# Patient Record
Sex: FEMALE | Race: WHITE | Employment: FULL TIME | URBAN - METROPOLITAN AREA
[De-identification: names, ages, dates, MRNs, and addresses within clinical notes are randomized per-mention and may not be internally consistent; named-entity substitution may affect disease eponyms.]

---

## 2020-03-18 ENCOUNTER — HOSPITAL ENCOUNTER (OUTPATIENT)
Dept: GENERAL RADIOLOGY | Age: 25
Discharge: HOME | End: 2020-03-18
Payer: COMMERCIAL

## 2020-03-18 ENCOUNTER — OFFICE VISIT (OUTPATIENT)
Dept: FAMILY MEDICINE CLINIC | Age: 25
End: 2020-03-18
Payer: COMMERCIAL

## 2020-03-18 VITALS
HEART RATE: 108 BPM | SYSTOLIC BLOOD PRESSURE: 118 MMHG | OXYGEN SATURATION: 98 % | WEIGHT: 163 LBS | DIASTOLIC BLOOD PRESSURE: 86 MMHG | BODY MASS INDEX: 32 KG/M2 | HEIGHT: 60 IN

## 2020-03-18 PROBLEM — M25.562 PAIN IN BOTH KNEES: Status: ACTIVE | Noted: 2020-03-18

## 2020-03-18 PROBLEM — J45.909 ASTHMA IN ADULT: Status: ACTIVE | Noted: 2020-03-18

## 2020-03-18 PROBLEM — F34.1 PERSISTENT DEPRESSIVE DISORDER: Status: ACTIVE | Noted: 2020-03-18

## 2020-03-18 PROBLEM — E55.9 VITAMIN D INSUFFICIENCY: Status: ACTIVE | Noted: 2020-03-18

## 2020-03-18 PROBLEM — E06.3 HASHIMOTO'S THYROIDITIS: Status: ACTIVE | Noted: 2020-03-18

## 2020-03-18 PROBLEM — M25.561 PAIN IN BOTH KNEES: Status: ACTIVE | Noted: 2020-03-18

## 2020-03-18 PROBLEM — Z13.220 LIPID SCREENING: Status: ACTIVE | Noted: 2020-03-18

## 2020-03-18 PROCEDURE — G8427 DOCREV CUR MEDS BY ELIG CLIN: HCPCS | Performed by: FAMILY MEDICINE

## 2020-03-18 PROCEDURE — 73560 X-RAY EXAM OF KNEE 1 OR 2: CPT

## 2020-03-18 PROCEDURE — G8431 POS CLIN DEPRES SCRN F/U DOC: HCPCS | Performed by: FAMILY MEDICINE

## 2020-03-18 PROCEDURE — 1036F TOBACCO NON-USER: CPT | Performed by: FAMILY MEDICINE

## 2020-03-18 PROCEDURE — G8417 CALC BMI ABV UP PARAM F/U: HCPCS | Performed by: FAMILY MEDICINE

## 2020-03-18 PROCEDURE — G0444 DEPRESSION SCREEN ANNUAL: HCPCS | Performed by: FAMILY MEDICINE

## 2020-03-18 PROCEDURE — 99203 OFFICE O/P NEW LOW 30 MIN: CPT | Performed by: FAMILY MEDICINE

## 2020-03-18 PROCEDURE — G8484 FLU IMMUNIZE NO ADMIN: HCPCS | Performed by: FAMILY MEDICINE

## 2020-03-18 RX ORDER — CLINDAMYCIN PHOSPHATE 10 UG/ML
LOTION TOPICAL 2 TIMES DAILY
COMMUNITY
End: 2021-03-29 | Stop reason: ALTCHOICE

## 2020-03-18 RX ORDER — ARMODAFINIL 150 MG/1
TABLET ORAL
COMMUNITY
Start: 2020-02-24 | End: 2020-05-14 | Stop reason: ALTCHOICE

## 2020-03-18 RX ORDER — ALBUTEROL SULFATE 90 UG/1
2 AEROSOL, METERED RESPIRATORY (INHALATION) 4 TIMES DAILY PRN
Qty: 1 INHALER | Refills: 5 | Status: SHIPPED | OUTPATIENT
Start: 2020-03-18 | End: 2022-03-11 | Stop reason: SDUPTHER

## 2020-03-18 RX ORDER — METHYLPHENIDATE HYDROCHLORIDE 10 MG/1
10 TABLET ORAL PRN
COMMUNITY
Start: 2020-01-31

## 2020-03-18 RX ORDER — METHYLPHENIDATE HYDROCHLORIDE 20 MG/1
20 CAPSULE, EXTENDED RELEASE ORAL 2 TIMES DAILY
COMMUNITY
Start: 2020-03-12

## 2020-03-18 RX ORDER — NORETHINDRONE ACETATE AND ETHINYL ESTRADIOL 1MG-20(21)
KIT ORAL
COMMUNITY
Start: 2020-03-16 | End: 2020-12-03 | Stop reason: ALTCHOICE

## 2020-03-18 RX ORDER — LEVOTHYROXINE SODIUM 0.07 MG/1
TABLET ORAL
COMMUNITY
Start: 2020-02-02 | End: 2021-04-16 | Stop reason: SDUPTHER

## 2020-03-18 ASSESSMENT — PATIENT HEALTH QUESTIONNAIRE - PHQ9
8. MOVING OR SPEAKING SO SLOWLY THAT OTHER PEOPLE COULD HAVE NOTICED. OR THE OPPOSITE, BEING SO FIGETY OR RESTLESS THAT YOU HAVE BEEN MOVING AROUND A LOT MORE THAN USUAL: 1
3. TROUBLE FALLING OR STAYING ASLEEP: 1
5. POOR APPETITE OR OVEREATING: 1
7. TROUBLE CONCENTRATING ON THINGS, SUCH AS READING THE NEWSPAPER OR WATCHING TELEVISION: 2
SUM OF ALL RESPONSES TO PHQ QUESTIONS 1-9: 13
4. FEELING TIRED OR HAVING LITTLE ENERGY: 2
SUM OF ALL RESPONSES TO PHQ9 QUESTIONS 1 & 2: 5
2. FEELING DOWN, DEPRESSED OR HOPELESS: 3
10. IF YOU CHECKED OFF ANY PROBLEMS, HOW DIFFICULT HAVE THESE PROBLEMS MADE IT FOR YOU TO DO YOUR WORK, TAKE CARE OF THINGS AT HOME, OR GET ALONG WITH OTHER PEOPLE: 2
1. LITTLE INTEREST OR PLEASURE IN DOING THINGS: 2
9. THOUGHTS THAT YOU WOULD BE BETTER OFF DEAD, OR OF HURTING YOURSELF: 0
SUM OF ALL RESPONSES TO PHQ QUESTIONS 1-9: 13
6. FEELING BAD ABOUT YOURSELF - OR THAT YOU ARE A FAILURE OR HAVE LET YOURSELF OR YOUR FAMILY DOWN: 1

## 2020-03-18 ASSESSMENT — ENCOUNTER SYMPTOMS
CONSTIPATION: 0
NAUSEA: 0
DIARRHEA: 0
SHORTNESS OF BREATH: 0
EYE REDNESS: 0
VOMITING: 0

## 2020-03-18 NOTE — PROGRESS NOTES
3/18/2020    Hellen Sacks (:  1995) is a 30 y.o. female, here for evaluation of the following medical concerns:    Knee Pain    Incident onset: yrs. There was no injury mechanism. Pain location: both knees. The quality of the pain is described as aching and burning (trun red). The pain is mild. The pain has been intermittent since onset. Pertinent negatives include no inability to bear weight, muscle weakness, numbness or tingling. The symptoms are aggravated by weight bearing. She has tried nothing for the symptoms. The treatment provided no relief. Chief Complaint   Patient presents with    Established New Doctor     new patient     clindomycin and hydrocortisone as needed. She has a history of PMDD:  Seeing counselor, for depression, he recommend CBT for PDD before meds. has been on med in past, when she was 21, only stayed on for 1 month. Has hypersomnia, did sleep study yrs ago, gets nuvigil and ritalin from previous pulmonologist.  She will continue to follow with her previous pulmonologist for this. Hx exercise induece d asthma with some mild intermittent asthma. She does well and rarely needs an inhaler but she does need a refill on that today. 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 He has Hashimoto's and has had it for years and is requesting a specialist for this. I will go ahead and order her TSH today and then will have her follow with endocrinology see plan. Review of Systems   Constitutional: Negative for unexpected weight change. HENT: Negative. Eyes: Negative for redness. Respiratory: Negative for shortness of breath. Cardiovascular: Negative for chest pain and leg swelling. Gastrointestinal: Negative for constipation, diarrhea, nausea and vomiting. Endocrine: Negative for cold intolerance and heat intolerance. Musculoskeletal: Positive for arthralgias. Negative for gait problem and joint swelling. Of knees   Skin: Negative for pallor. She gets erythematous hives especially when she is nervous or talking about herself   Allergic/Immunologic: Negative for immunocompromised state. Neurological: Positive for dizziness. Negative for tingling and numbness. She does admit to rare and recurrent episodes of 30 second dizziness  For yrs happens usually when she is in a meeting or staring at the computer. Not associated with ora balance problem or confusion or loss of consciousness. Psychiatric/Behavioral: Negative for confusion. All other systems reviewed and are negative. Prior to Visit Medications    Medication Sig Taking?  Authorizing Provider   levothyroxine (SYNTHROID) 75 MCG tablet  Yes Historical Provider, MD   Armodafinil (NUVIGIL) 150 MG TABS tablet  Yes Historical Provider, MD   methylphenidate (RITALIN LA) 20 MG extended release capsule  Yes Historical Provider, MD   methylphenidate (RITALIN) 10 MG tablet  Yes Historical Provider, MD   BLISOVI FE 1/20 1-20 MG-MCG per tablet  Yes Historical Provider, MD        Allergies   Allergen Reactions    Latex Rash       Past Medical History:   Diagnosis Date    Asthma     Hashimoto's thyroiditis     Hypersomnia     PCOS (polycystic ovarian syndrome)        Past Surgical History:   Procedure Laterality Date    BREAST ENHANCEMENT SURGERY      TONSILLECTOMY AND ADENOIDECTOMY         Social History     Socioeconomic History    Marital status: Single     Spouse name: Not on file    Number of children: Not on file    Years of education: Not on file    Highest education level: Not on file   Occupational History    Not on file   Social Needs    Financial resource strain: Not on file    Food insecurity     Worry: Not on file     Inability: Not on file    Transportation needs     Medical: Not on file     Non-medical: Not on file   Tobacco Use  Smoking status: Never Smoker    Smokeless tobacco: Never Used   Substance and Sexual Activity    Alcohol use: Yes    Drug use: Not Currently    Sexual activity: Yes   Lifestyle    Physical activity     Days per week: Not on file     Minutes per session: Not on file    Stress: Not on file   Relationships    Social connections     Talks on phone: Not on file     Gets together: Not on file     Attends [employer] service: Not on file     Active member of club or organization: Not on file     Attends meetings of clubs or organizations: Not on file     Relationship status: Not on file    Intimate partner violence     Fear of current or ex partner: Not on file     Emotionally abused: Not on file     Physically abused: Not on file     Forced sexual activity: Not on file   Other Topics Concern    Not on file   Social History Narrative    Not on file        Family History   Problem Relation Age of Onset    Cancer Mother     Diabetes Father     Cancer Maternal Aunt     Cancer Paternal Aunt     Cancer Maternal Grandmother        Vitals:    03/18/20 1022 03/18/20 1024   BP: (!) 136/107 118/86   Site: Right Lower Arm Left Upper Arm   Position: Sitting Sitting   Cuff Size: Medium Adult Medium Adult   Pulse: 108    SpO2: 98%    Weight: 163 lb (73.9 kg)    Height: 5' (1.524 m)      Estimated body mass index is 31.83 kg/m² as calculated from the following:    Height as of this encounter: 5' (1.524 m). Weight as of this encounter: 163 lb (73.9 kg). Physical Exam  Vitals signs and nursing note reviewed. Constitutional:       General: She is not in acute distress. Appearance: Normal appearance. She is well-developed. She is obese. She is not ill-appearing, toxic-appearing or diaphoretic. HENT:      Head: Normocephalic and atraumatic. Eyes:      General: No scleral icterus. Right eye: No discharge. Left eye: No discharge.       Conjunctiva/sclera: Conjunctivae normal.   Neck: Musculoskeletal: Neck supple. No neck rigidity. Cardiovascular:      Rate and Rhythm: Normal rate and regular rhythm. Heart sounds: Normal heart sounds. Comments: Rate during physical exam was 94  Pulmonary:      Effort: Pulmonary effort is normal. No respiratory distress. Breath sounds: Normal breath sounds. No stridor. Musculoskeletal:      Right knee: She exhibits normal patellar mobility. No tenderness found. Left knee: No tenderness found. Right lower leg: No edema. Left lower leg: No edema. Comments: Joint crepitus with movement on her left knee. Skin:     General: Skin is warm and dry. Coloration: Skin is not pale. Findings: No erythema or rash. Neurological:      Mental Status: She is alert and oriented to person, place, and time. Psychiatric:         Mood and Affect: Mood normal.         Behavior: Behavior normal.         Thought Content: Thought content normal.         Judgment: Judgment normal.          Diagnosis Orders   1. Hashimoto's thyroiditis  TSH WITH REFLEX TO FT4    Adolfo Velez MD, Endocrinology, [facility]    BASIC METABOLIC PANEL   2. Positive depression screening  Positive Screen for Clinical Depression with a Documented Follow-up Plan    3. Vitamin D insufficiency  Vitamin D 25 Hydroxy   4. Pain in both knees, unspecified chronicity  XR KNEE LEFT (1-2 VIEWS)   5. Lipid screening  Lipid Panel   6. Mild intermittent asthma in adult without complication     7. Persistent depressive disorder       [facility] was seen today for established new doctor. Diagnoses and all orders for this visit:    Hashimoto's thyroiditis  -     TSH WITH REFLEX TO FT4; Future  -     Adolfo Velez MD, Endocrinology, [facility]  -     BASIC METABOLIC PANEL;  Future    Positive depression screening/Persistent depressive disorder  -     Positive Screen for Clinical Depression with a Documented Follow-up Plan     Vitamin D insufficiency  -     Vitamin D 25 Hydroxy; Future    Pain in both knees, unspecified chronicity  -     XR KNEE LEFT (1-2 VIEWS); Future    Lipid screening  -     Lipid Panel; Future    Mild intermittent asthma in adult without complication  -     albuterol sulfate HFA (VENTOLIN HFA) 108 (90 Base) MCG/ACT inhaler; Inhale 2 puffs into the lungs 4 times daily as needed for Wheezing            On the basis of positive PHQ-9 screening (PHQ-9 Total Score: 13), the following plan was implemented: she already has dx of PDD, following with counselor .     If you need a form filled out for Be well within, we can do it from todays exam.      --Shasha Herrera, DO on 3/18/2020 at 10:25 AM

## 2020-03-18 NOTE — PATIENT INSTRUCTIONS
Your fasting blood sugar should be below 100. If it is between 100-125 that is considered high and known as prediabetes, or  impaired glucose tolerance. If your blood sugar is too high, go to diabetes. org for a diabetes prevention diet. The A1c shows your average blood sugar over the previous 3 months. It is sometimes ordered if your blood sugar is elevated  You do not have to be fasting to get it drawn. If it is 5.6 or below it means your blood sugar is in the normal range  If between 5.7 and 6.5 it is impaired glucose tolerance. If it is above 6.5 it is consistent with the diagnosis of  diabetes. Decrease high carb foods if your blood sugar is high. High carbohydrate foods are:  Breads, muffins, rolls, and bagels  Pasta, rice, corn, and grains  Potatoes, sweet potatoes  Legumes: peas beans lentils. Milk ( almond milk ok)  Most fruit except berries  Cake cookies pies ice cream candy etc.  Juices, soda, sweetened ice tea  Beer. Cholesterol, the ideal numbers explained: Total cholesterol should be below 200  The triglycerides should be below 150  The HDL, the good cholesterol should be above 40  The LDL, the bad cholesterol should be below 100. Although it can be as high as 130 if no risk factors for heart disease or no diabetes. Improve your cholesterol profile:     Cardiovascular exercise helps. Start with 15 minutes three times a week and the work up to at least 30 minutes 5 days a week. Exercise at a level that you can carry on a conversation during. Loose extra pounds. Pay attention to your serving sides and avoid eating second helpings at meals. Significantly decrease the amount of processed food, junk food, and fast food that you eat. Decrease animal sources of saturated fats, and completely avoid and eliminate  hydrogenated oils and trans fats. Check the Food Label on the food you eat and avoid foods that have hydrogenated vegetable oils in them.  That includes bakery products. Drink skim milk which contains no fat. Increase the amount of fresh food that you cook yourself. Eat at least five servings of fruits and vegetables a day. Increase the portion of your plate that contains the fruit and vegetables to at least 50%,-70%,  and decrease the amount of starches like potatoes, rice, pasta to no more than 25% of your plate. Increase your fiber intake. Fiber is found in fruits and vegetables as well as whole grains, oatmeal,  and beans. A diet with at least 5 servings of fruits and vegetables should give you about 10-20grams of fiber daily. Switch to monounsaturated fats like olive oil. Fat from olives, avocados, coconut, or other nuts is okay. Add baked or grilled fish to you diet at least 1-2 times a week. Learn more about cholesterol on the Internet. Check out these resources:    American Heart Association   Heart. [address]:   [employer]    Triglycerides can be lowered without medication by exercising, and loosing weight and eating a diet lower in carbohydrates especially from flour sources,  and avoiding simple sugars. The good cholesterol is HDL. In order to increase the good cholesterol, increasing cardiovascular exercise helps. Avoid hydrogenated oils (trans fats) in processed, bakery,  and junk food. Use monounsaturated fats such as olive oil can help raise the good cholesterol. For your weight, exercise 30 minutes 5 times weekly and improve the types of food you eat:    Protein   Best options: The American Diabetes Association (ADA) recommends lean proteins low in saturated fat, like fish or turkey. Aim for two servings of seafood each week; some fish, like salmon, have the added benefit of containing heart healthy omega-3 fats. For a vegetarian protein source, experiment with the wide variety of beans. Nuts, which are protein and healthy fats powerhouses, are also a choice.    Worst options: Processed deli meats and hot day.      Avoid or decrease processed food, fast food, and junk food.     Begin to exercise 15 minutes three times a week doing cardiovascular exercise.     Don't quit. It can take 12 weeks to break old habits before you feel like you are in a new routine. Then you have to live your new lifestyle. Loren Leonard Once you are adjusted to your new habits you will not have to keep recording your daily intake, you should have a good idea of what is a normal amount for you to eat each day.     If you are obese, have elevated blood sugar, heart disease or a medical condition that is worsened by excess weight, I can refer you to a [employer] for education, usually covered by insurance.   

## 2020-04-06 ENCOUNTER — VIRTUAL VISIT (OUTPATIENT)
Dept: GYNECOLOGY | Age: 25
End: 2020-04-06
Payer: COMMERCIAL

## 2020-04-06 PROCEDURE — G8417 CALC BMI ABV UP PARAM F/U: HCPCS | Performed by: OBSTETRICS & GYNECOLOGY

## 2020-04-06 PROCEDURE — G8427 DOCREV CUR MEDS BY ELIG CLIN: HCPCS | Performed by: OBSTETRICS & GYNECOLOGY

## 2020-04-06 PROCEDURE — 99202 OFFICE O/P NEW SF 15 MIN: CPT | Performed by: OBSTETRICS & GYNECOLOGY

## 2020-04-06 PROCEDURE — 1036F TOBACCO NON-USER: CPT | Performed by: OBSTETRICS & GYNECOLOGY

## 2020-04-06 RX ORDER — DROSPIRENONE AND ETHINYL ESTRADIOL 0.03MG-3MG
1 KIT ORAL DAILY
Qty: 1 PACKET | Refills: 3 | Status: SHIPPED | OUTPATIENT
Start: 2020-04-06 | End: 2020-08-03

## 2020-04-17 PROBLEM — Z13.220 LIPID SCREENING: Status: RESOLVED | Noted: 2020-03-18 | Resolved: 2020-04-17

## 2020-05-14 ENCOUNTER — VIRTUAL VISIT (OUTPATIENT)
Dept: ENDOCRINOLOGY | Age: 25
End: 2020-05-14
Payer: COMMERCIAL

## 2020-05-14 PROCEDURE — 99442 PR PHYS/QHP TELEPHONE EVALUATION 11-20 MIN: CPT | Performed by: INTERNAL MEDICINE

## 2020-05-14 PROCEDURE — G8427 DOCREV CUR MEDS BY ELIG CLIN: HCPCS | Performed by: INTERNAL MEDICINE

## 2020-05-14 ASSESSMENT — ENCOUNTER SYMPTOMS
VOMITING: 0
SORE THROAT: 0
ABDOMINAL PAIN: 0
COUGH: 0
NAUSEA: 0
SHORTNESS OF BREATH: 0
BACK PAIN: 0

## 2020-05-14 NOTE — PROGRESS NOTES
ELZA Ruth is a 30 y.o. female who was seen in a virtual visit  for initial evaluation of thyroid disease. Patient is being seen at the request of Paul Hernandez DO     Was seeing Omar Schuster MD at [address]      Due to the COVID-19 restrictions on close contact interactions the patient's visit was conducted via video link  ( [email or web address] me) in lieu of a face to face visit. Location for patient : home  Physician : home    Pursuant to the emergency declaration under the [facility], [address] [place] [address] and the [employer] Act, this Virtual  Visit was conducted, with patient's consent, to reduce the patient's risk of exposure to COVID-19 and provide necessary care. Because this was a Virtual Visit, evaluation of the following organ systems was limited: Vitals, Constitutional, EENT, Resp, CV, GI, , MS, Neuro, Skin. Heme. Lymph, Imm. Patient has a PMH of hypothyroidism, PCOS, obesity, vitamin D     Diagnosed with Hypothyroidism in her early 19 yrs. Was diagnosed with hashimoto's thyroiditis at the age of 10 yrs. Current thyroid medication: levothyroxine 75 mcg daily. Takes it first thing in the morning on empty stomach      FH of hypothyroidism: Aunt . FH of thyroid cancer: No       C/o Fatigue, weight gain   C/o cold intolerance,  C/o  hair loss, dry skin,   No constipation         PCOS : Has been diagnosed with PCOS in her teens. Reports high testosterone and cysts on her ovaries at the age of 13 yrs. She has been on  birth control since age of 13 yrs  No plans for fertility at this point. Never used metformin. Has h/o hirsutism. On birth control. Could not tolerate aldactone     Has hair on her face ( side buns ) . Gets those removed regularly. Obesity : has tried sexanda in the past and lost weight.    Stopped as she did not want to take daily shot

## 2020-05-19 ENCOUNTER — TELEPHONE (OUTPATIENT)
Dept: ENDOCRINOLOGY | Age: 25
End: 2020-05-19

## 2020-08-03 RX ORDER — DROSPIRENONE AND ETHINYL ESTRADIOL 0.03MG-3MG
KIT ORAL
Qty: 28 TABLET | Refills: 2 | Status: SHIPPED | OUTPATIENT
Start: 2020-08-03 | End: 2020-10-26

## 2020-08-03 NOTE — TELEPHONE ENCOUNTER
Medication:   Requested Prescriptions     Pending Prescriptions Disp Refills    SALLIE 3-0.03 MG TABS [Pharmacy Med Name: SALLIE 28 TABLET] 28 tablet 2     Sig: TAKE ONE TABLET BY MOUTH DAILY     Last Filled: N/A    Last Appt: 4/6/2020  Next Appt: Visit date not found

## 2020-08-20 ENCOUNTER — HOSPITAL ENCOUNTER (OUTPATIENT)
Age: 25
Discharge: HOME | End: 2020-08-20
Payer: COMMERCIAL

## 2020-08-20 LAB — TSH REFLEX FT4: 0.86 UIU/ML (ref 0.27–4.2)

## 2020-08-20 PROCEDURE — 36415 COLL VENOUS BLD VENIPUNCTURE: CPT

## 2020-08-20 PROCEDURE — 84443 ASSAY THYROID STIM HORMONE: CPT

## 2020-09-04 ENCOUNTER — EMPLOYEE WELLNESS (OUTPATIENT)
Dept: OTHER | Age: 25
End: 2020-09-04

## 2020-09-04 LAB
CHOLESTEROL, TOTAL: 186 MG/DL (ref 0–199)
GLUCOSE BLD-MCNC: 91 MG/DL (ref 70–99)
HDLC SERPL-MCNC: 67 MG/DL (ref 40–60)
LDL CHOLESTEROL CALCULATED: 96 MG/DL
TRIGL SERPL-MCNC: 117 MG/DL (ref 0–150)

## 2020-10-13 ENCOUNTER — HOSPITAL ENCOUNTER (OUTPATIENT)
Dept: ULTRASOUND IMAGING | Age: 25
Discharge: HOME | End: 2020-10-13
Payer: COMMERCIAL

## 2020-10-13 PROCEDURE — 76536 US EXAM OF HEAD AND NECK: CPT

## 2020-10-15 ENCOUNTER — PATIENT MESSAGE (OUTPATIENT)
Dept: FAMILY MEDICINE CLINIC | Age: 25
End: 2020-10-15

## 2020-10-15 NOTE — TELEPHONE ENCOUNTER
From: Rebeca Vicente  To: Norma Farias DO  Sent: 10/15/2020 10:25 AM EDT  Subject: Non-Urgent Medical Question    Good Morning-    For a couple weeks I have been feeling a mostly constant pressure in my lower front neck. It feels as though something is pressing against it. I contacted my Endocrinologist and had an ultrasound on my thyroid which came back fine. He advised to reach out to my PCP to get a referral for an ENT if the pressure persists. I also have a few bumps towards the back of my throat that could be scar tissue from having my tonsils removed but I am not certain. Thank you.

## 2020-10-19 VITALS — BODY MASS INDEX: 31.64 KG/M2 | WEIGHT: 162 LBS

## 2020-10-23 ENCOUNTER — OFFICE VISIT (OUTPATIENT)
Dept: ENT CLINIC | Age: 25
End: 2020-10-23
Payer: COMMERCIAL

## 2020-10-23 VITALS
BODY MASS INDEX: 33.18 KG/M2 | HEART RATE: 94 BPM | SYSTOLIC BLOOD PRESSURE: 121 MMHG | HEIGHT: 60 IN | DIASTOLIC BLOOD PRESSURE: 80 MMHG | WEIGHT: 169 LBS | TEMPERATURE: 97.5 F | RESPIRATION RATE: 16 BRPM

## 2020-10-23 PROCEDURE — 99203 OFFICE O/P NEW LOW 30 MIN: CPT | Performed by: OTOLARYNGOLOGY

## 2020-10-23 PROCEDURE — G8484 FLU IMMUNIZE NO ADMIN: HCPCS | Performed by: OTOLARYNGOLOGY

## 2020-10-23 PROCEDURE — 1036F TOBACCO NON-USER: CPT | Performed by: OTOLARYNGOLOGY

## 2020-10-23 PROCEDURE — G8427 DOCREV CUR MEDS BY ELIG CLIN: HCPCS | Performed by: OTOLARYNGOLOGY

## 2020-10-23 PROCEDURE — G8417 CALC BMI ABV UP PARAM F/U: HCPCS | Performed by: OTOLARYNGOLOGY

## 2020-10-23 RX ORDER — IBUPROFEN 600 MG/1
600 TABLET ORAL 4 TIMES DAILY PRN
Qty: 120 TABLET | Refills: 0 | Status: SHIPPED | OUTPATIENT
Start: 2020-10-23 | End: 2020-11-10

## 2020-10-23 NOTE — PROGRESS NOTES
[address] [facility] SURGERY  NEW PATIENT HISTORY AND PHYSICAL NOTE      Patient Name: Andrea Qiu  Medical Record Number:  [id number]  Primary Care Physician:  Michelle Culp DO    ChiefComplaint     Chief Complaint   Patient presents with    Other     Patient states that she has pain in lower anterior neck, states that she had an US and her thyroid was normal. States that she has bumps on the back of her tongue. States that she had tonsilectomy in jan 2018, is unsure if these bumps are scar tissue or are new. History of Present Illness     Andrea Qiu is an 30 y.o. female with history of anterior neck discomfort without sabas pain that is been ongoing for the past 2 weeks, with no incident infection or trauma. Symptoms are intermittent in nature, although she states they are worse when she is sitting upright-cannot lying supine. No hoarseness, dysphagia/odynophagia. She has prior history of aural fullness and pain (improved with Flonase-ETD?)  However no referred otalgia or aural fullness or hearing loss at this time. Prior history of Hashimoto's thyroiditis, recent thyroid ultrasound without active thyroiditis or thyroid nodules. Has history of thyroid disease in the family however no history of thyroidectomy / thyroid cancer. Prior history of cervical HPV, states that at last OB/Gyn visit she was told it had cleared. She has prior history of tonsillectomy in 2019 for a papillomatous mass on the right tonsil.      Past Medical History     Past Medical History:   Diagnosis Date    Allergic rhinitis     Asthma     Dizziness     Hashimoto's thyroiditis     Headache     HPV (human papilloma virus) infection     Hypersomnia     PCOS (polycystic ovarian syndrome)        Past Surgical History     Past Surgical History:   Procedure Laterality Date    BREAST ENHANCEMENT SURGERY      COLPOSCOPY      TONSILLECTOMY AND ADENOIDECTOMY         Family History     Family History   Problem Relation Age of Onset    Cancer Mother     Diabetes Father     Cancer Maternal Aunt     Cancer Paternal Aunt     Cancer Maternal Grandmother        Social History     Social History     Tobacco Use    Smoking status: Never Smoker    Smokeless tobacco: Never Used   Substance Use Topics    Alcohol use: Yes    Drug use: Not Currently        Allergies     Allergies   Allergen Reactions    Latex Rash       Medications     Current Outpatient Medications   Medication Sig Dispense Refill    ibuprofen (ADVIL;MOTRIN) 600 MG tablet Take 1 tablet by mouth 4 times daily as needed for Pain 120 tablet 0    SALLIE 3-0.03 MG TABS TAKE ONE TABLET BY MOUTH DAILY 28 tablet 2    levothyroxine (SYNTHROID) 75 MCG tablet       methylphenidate (RITALIN LA) 20 MG extended release capsule       methylphenidate (RITALIN) 10 MG tablet       albuterol sulfate HFA (VENTOLIN HFA) 108 (90 Base) MCG/ACT inhaler Inhale 2 puffs into the lungs 4 times daily as needed for Wheezing 1 Inhaler 5    BLISOVI FE 1/20 1-20 MG-MCG per tablet       hydrocortisone 2.5 % ointment Apply topically 2 times daily Apply topically 2 times daily.  clindamycin (CLEOCIN T) 1 % lotion Apply topically 2 times daily Apply topically 2 times daily. No current facility-administered medications for this visit.         Review of Systems     REVIEW OF SYSTEMS  The following systems were reviewed and revealed the following in addition to any already discussed in the HPI:    CONSTITUTIONAL: no  weight loss, no fever, no night sweats, no chills  EYES: no vision changes, no blurry vision  EARS: no changes in hearing, no otalgia  NOSE: no epistaxis, no rhinorrhea  RESPIRATORY: no difficulty breathing, no shortness of breath  CV: no chest pain, no peripheral vascular disease  HEME: No coagulation disorder, no bleeding disorder  NEURO: No TIA or stroke-like symptoms  SKIN: No new rashes in the head and neck, no recent skin cancers  MOUTH: No new ulcers, no recent teeth infections  GASTROINTESTINAL: No diarrhea, stomach pain  PSYCH: No anxiety, no depression    PhysicalExam     Vitals:    10/23/20 1310   BP: 121/80   Site: Right Upper Arm   Position: Sitting   Cuff Size: Medium Adult   Pulse: 94   Resp: 16   Temp: 97.5 °F (36.4 °C)   TempSrc: Infrared   Weight: 169 lb (76.7 kg)   Height: 5' (1.524 m)       PHYSICAL EXAM  /80 (Site: Right Upper Arm, Position: Sitting, Cuff Size: Medium Adult)   Pulse 94   Temp 97.5 °F (36.4 °C) (Infrared)   Resp 16   Ht 5' (1.524 m)   Wt 169 lb (76.7 kg)   BMI 33.01 kg/m²     GENERAL: No Acute Distress, Alert and Oriented, no hoarseness  EYES: EOMI, Anti-icteric  NOSE: On anterior rhinoscopy there is no epistaxis, nasal mucosa within normal limits, no purulent drainage  EARS: Normal external appearance; on portable otomicroscopy:  -Right ear: External auditory canal without stenosis, tympanic membrane clear, no middle ear effusions or retractions  -Left ear: External auditory canal without stenosis, tympanic membrane clear, no middle ear effusions or retractions  -Pneumatic otoscopy: With pneumatic otoscopy there is good mobility of the right tympanic membrane, there is good mobility of the left tympanic membrane, there is no induced vertigo with pneumatic otoscopy  FACE: 1/6 House-Brackmann Scale, symmetric, sensation equal bilaterally  ORAL CAVITY: No masses or lesions palpated, uvula is midline, moist mucous membranes, 0+ tonsils , good dentition  -Dental mirror exam: Base of tongue with no masses, uvula anatomically normal, epiglottis visualized. NECK: Normal range of motion, no thyromegaly, trachea is midline, no lymphadenopathy, no neck masses, no crepitus. Palpation of the anterior the neck elicits slight discomfort, no sabas pain produced.   CHEST: Normal respiratory effort, no retractions, breathing comfortably  SKIN: No rashes, normal appearing skin, no evidence of skin lesions/tumors  NEURO: CN 2, 3, 4, 5, 6, 7, 11, 12 intact bilaterally     Data/Imaging Review     Reviewed read of ultrasound with patient. Normal size, no nodules. Procedure     None    Assessment and Plan     1. Neck discomfort  49-year-old female with 2 weeks of neck discomfort. No dysphagia/odynophagia, no changes to her voice, no aural fullness or referred otalgia. On examination we do not appreciate any lymphadenopathy, pharyngitis or sabas tenderness of the neck no thyromegaly-he does state discomfort on palpation. Of interest, discomfort is worsened on neck extension and occasionally with deglutition. Given reproducible discomfort, we will begin treatment for musculoskeletal etiology. We have asked her to place warm compresses to the anterior neck, to use ibuprofen on an as-needed basis. We will see her back in 1 month-if symptoms worsen, or she develops dysphagia/odynophagia, hoarseness or sabas neck pain she is to notify the clinic immediately. - ibuprofen (ADVIL;MOTRIN) 600 MG tablet; Take 1 tablet by mouth 4 times daily as needed for Pain  Dispense: 120 tablet; Refill: 0    Return in about 4 weeks (around 11/20/2020). Heather Neves MD  [facility]  Department of Otolaryngology/Head and Neck Surgery  10/23/20    I have performed a head and neck physical exam personally or was physically present during the key or critical portions of the service. Medical Decision Making: The following items were considered in medical decision making:  Independent review of images  Review / order clinical lab tests  Review / order radiology tests  Decision to obtain old records  Review and summation of old records as accessed through [facility] (a summary of my findings in these old records: None)     Portions of this note were dictated using Dragon.  There may be linguistic errors secondary to the use of this program.

## 2020-10-23 NOTE — PATIENT INSTRUCTIONS
-Take ibuprofen as prescribed when neck discomfort worsens  -Warm compresses to the anterior neck 3-4x/day  -Notify ENT clinic with any sabas neck pain, difficulty swallowing, voice changes

## 2020-10-26 RX ORDER — DROSPIRENONE AND ETHINYL ESTRADIOL 0.03MG-3MG
KIT ORAL
Qty: 28 TABLET | Refills: 1 | Status: SHIPPED | OUTPATIENT
Start: 2020-10-26 | End: 2020-12-22

## 2020-11-10 ENCOUNTER — VIRTUAL VISIT (OUTPATIENT)
Dept: FAMILY MEDICINE CLINIC | Age: 25
End: 2020-11-10
Payer: COMMERCIAL

## 2020-11-10 PROCEDURE — G8427 DOCREV CUR MEDS BY ELIG CLIN: HCPCS | Performed by: FAMILY MEDICINE

## 2020-11-10 PROCEDURE — 99213 OFFICE O/P EST LOW 20 MIN: CPT | Performed by: FAMILY MEDICINE

## 2020-11-10 RX ORDER — SERTRALINE HYDROCHLORIDE 25 MG/1
25 TABLET, FILM COATED ORAL DAILY
Qty: 30 TABLET | Refills: 5 | Status: SHIPPED | OUTPATIENT
Start: 2020-11-10 | End: 2021-05-11

## 2020-11-10 NOTE — PROGRESS NOTES
TELEHEALTH EVALUATION -- Audio/Visual (During YZMAC-97 public health emergency)    HPI:  The patient has requested an audio/video evaluation for the following concern(s):  Chief Complaint   Patient presents with    Anxiety   She reports  that she had been taking cognitive therapy with a therapist for anxiety but it was not resolving her symptoms, in the past few weeks her symptoms have increased and she is having problems  Managing her anxiety. Is also associated with  crying more frequently. She is also having difficulty with panic feelings. It is affecting her work and is in her personal life. She had to call in sick to work recently because her symptoms were not controlled. She has been treated for similar symptoms in the past but she cannot remember the name of the medication but she did not like how it made her feel so she discontinued it. Past Medical History:   Diagnosis Date    Allergic rhinitis     Asthma     Dizziness     Hashimoto's thyroiditis     Headache     HPV (human papilloma virus) infection     Hypersomnia     PCOS (polycystic ovarian syndrome)      Social History     Socioeconomic History    Marital status: Single     Spouse name: Not on file    Number of children: Not on file    Years of education: Not on file    Highest education level: Not on file   Occupational History    Not on file   Social Needs    Financial resource strain: Not on file    Food insecurity     Worry: Not on file     Inability: Not on file    Transportation needs     Medical: Not on file     Non-medical: Not on file   Tobacco Use    Smoking status: Never Smoker    Smokeless tobacco: Never Used   Substance and Sexual Activity    Alcohol use:  Yes    Drug use: Not Currently    Sexual activity: Yes     Partners: Male   Lifestyle    Physical activity     Days per week: Not on file     Minutes per session: Not on file    Stress: Not on file   Relationships    Social connections     Talks on phone: Not on file     Gets together: Not on file     Attends Ten[employer] service: Not on file     Active member of club or organization: Not on file     Attends meetings of clubs or organizations: Not on file     Relationship status: Not on file    Intimate partner violence     Fear of current or ex partner: Not on file     Emotionally abused: Not on file     Physically abused: Not on file     Forced sexual activity: Not on file   Other Topics Concern    Not on file   Social History Narrative    Not on file           Review of Systems   Constitutional: Negative. HENT: Negative. Eyes: Negative for blurred vision, discharge and redness. Respiratory: Negative for chest tightness and shortness of breath. Cardiovascular: Negative for chest pain and palpitations. Gastrointestinal: Negative. Endocrine: Negative. Genitourinary: Negative. Musculoskeletal: Negative. Allergic/Immunologic: Negative for immunocompromised state. Neurological: Negative for dizziness. Psychiatric/Behavioral: Negative for agitation, behavioral problems and confusion. All other systems reviewed and are negative. .      Constitutional: [x] Appears well-developed and well-nourished [x] No apparent distress      [] Abnormal-   Mental status  [x] Alert and awake  [x] Oriented to person/place/time [x]Able to follow commands      Eyes:  EOM    [x]  Normal  [] Abnormal-  Sclera  [x]  Normal  [] Abnormal -         Discharge [x]  None visible  [] Abnormal -    HENT:   [x] Normocephalic, atraumatic.   [] Abnormal   [] Mouth/Throat: Mucous membranes are moist.     External Ears [x] Normal  [] Abnormal-     Neck: [x] No visualized mass     Pulmonary/Chest: [x] Respiratory effort normal.  [x] No visualized signs of difficulty breathing or respiratory distress        [] Abnormal-    Able to speak in full sentences without difficulty  Musculoskeletal:   [] Normal gait with no signs of ataxia         [x] Normal range of motion of

## 2020-11-12 ENCOUNTER — OFFICE VISIT (OUTPATIENT)
Dept: ENT CLINIC | Age: 25
End: 2020-11-12
Payer: COMMERCIAL

## 2020-11-12 VITALS
WEIGHT: 169 LBS | DIASTOLIC BLOOD PRESSURE: 79 MMHG | BODY MASS INDEX: 33.18 KG/M2 | SYSTOLIC BLOOD PRESSURE: 113 MMHG | HEART RATE: 90 BPM | TEMPERATURE: 97.2 F | HEIGHT: 60 IN

## 2020-11-12 PROCEDURE — G8427 DOCREV CUR MEDS BY ELIG CLIN: HCPCS | Performed by: OTOLARYNGOLOGY

## 2020-11-12 PROCEDURE — G8417 CALC BMI ABV UP PARAM F/U: HCPCS | Performed by: OTOLARYNGOLOGY

## 2020-11-12 PROCEDURE — G8484 FLU IMMUNIZE NO ADMIN: HCPCS | Performed by: OTOLARYNGOLOGY

## 2020-11-12 PROCEDURE — 1036F TOBACCO NON-USER: CPT | Performed by: OTOLARYNGOLOGY

## 2020-11-12 PROCEDURE — 31575 DIAGNOSTIC LARYNGOSCOPY: CPT | Performed by: OTOLARYNGOLOGY

## 2020-11-12 PROCEDURE — 99213 OFFICE O/P EST LOW 20 MIN: CPT | Performed by: OTOLARYNGOLOGY

## 2020-11-12 RX ORDER — OMEPRAZOLE 40 MG/1
40 CAPSULE, DELAYED RELEASE ORAL
Qty: 90 CAPSULE | Refills: 1 | Status: SHIPPED | OUTPATIENT
Start: 2020-11-12 | End: 2021-03-29 | Stop reason: ALTCHOICE

## 2020-11-12 RX ORDER — NIZATIDINE 150 MG/1
150 CAPSULE ORAL NIGHTLY
Qty: 90 CAPSULE | Refills: 1 | Status: SHIPPED | OUTPATIENT
Start: 2020-11-12 | End: 2020-12-23 | Stop reason: ALTCHOICE

## 2020-11-12 NOTE — PATIENT INSTRUCTIONS
people. If your symptoms are worse after you eat a certain food, you may want to stop eating that food to see if your symptoms get better. · Do not smoke or chew tobacco. Smoking can make GERD worse. If you need help quitting, talk to your doctor about stop-smoking programs and medicines. These can increase your chances of quitting for good. · If you have GERD symptoms at night, raise the head of your bed 6 to 8 inches by putting the frame on blocks or placing a foam wedge under the head of your mattress. (Adding extra pillows does not work.)  · Do not wear tight clothing around your middle. · Lose weight if you need to. Losing just 5 to 10 pounds can help. When should you call for help? Call your doctor now or seek immediate medical care if:    · You have new or different belly pain.     · Your stools are black and tarlike or have streaks of blood. Watch closely for changes in your health, and be sure to contact your doctor if:    · Your symptoms have not improved after 2 days.     · Food seems to catch in your throat or chest.   Where can you learn more? Go to [email or web address] org and sign in to your [employer] account. Enter G642 in the [facility] box to learn more about \"Gastroesophageal Reflux Disease (GERD): Care Instructions. \"     If you do not have an account, please click on the \"Sign Up Now\" link. Current as of: April 15, 2020               Content Version: 12.6  © 8138-4973 [employer]. Care instructions adapted under license by [facility] ([facility]). If you have questions about a medical condition or this instruction, always ask your healthcare professional. [place] [address] any warranty or liability for your use of this information.

## 2020-11-12 NOTE — PROGRESS NOTES
Surgical History:   Procedure Laterality Date    BREAST ENHANCEMENT SURGERY      COLPOSCOPY      TONSILLECTOMY AND ADENOIDECTOMY         Family History     Family History   Problem Relation Age of Onset    Cancer Mother     Diabetes Father     Cancer Maternal Aunt     Cancer Paternal Aunt     Cancer Maternal Grandmother        Social History     Social History     Tobacco Use    Smoking status: Never Smoker    Smokeless tobacco: Never Used   Substance Use Topics    Alcohol use: Yes    Drug use: Not Currently        Allergies     Allergies   Allergen Reactions    Latex Rash       Medications     Current Outpatient Medications   Medication Sig Dispense Refill    sertraline (ZOLOFT) 25 MG tablet Take 1 tablet by mouth daily 30 tablet 5    SALLIE 3-0.03 MG TABS TAKE ONE TABLET BY MOUTH DAILY 28 tablet 1    levothyroxine (SYNTHROID) 75 MCG tablet       methylphenidate (RITALIN LA) 20 MG extended release capsule       methylphenidate (RITALIN) 10 MG tablet       BLISOVI FE 1/20 1-20 MG-MCG per tablet       albuterol sulfate HFA (VENTOLIN HFA) 108 (90 Base) MCG/ACT inhaler Inhale 2 puffs into the lungs 4 times daily as needed for Wheezing 1 Inhaler 5    hydrocortisone 2.5 % ointment Apply topically 2 times daily Apply topically 2 times daily.  clindamycin (CLEOCIN T) 1 % lotion Apply topically 2 times daily Apply topically 2 times daily. No current facility-administered medications for this visit.         Review of Systems     REVIEW OF SYSTEMS  The following systems were reviewed and revealed the following in addition to any already discussed in the HPI:    CONSTITUTIONAL: no  weight loss, no fever, no night sweats, no chills  EYES: no vision changes, no blurry vision  EARS: no changes in hearing, no otalgia  NOSE: no epistaxis, no rhinorrhea  RESPIRATORY: no difficulty breathing, no shortness of breath  CV: no chest pain, no peripheral vascular disease  HEME: No coagulation disorder, no bleeding disorder  NEURO: No TIA or stroke-like symptoms  SKIN: No new rashes in the head and neck, no recent skin cancers  MOUTH: No new ulcers, no recent teeth infections  GASTROINTESTINAL: No diarrhea, stomach pain  PSYCH: No anxiety, no depression    PhysicalExam     Vitals:    11/12/20 0906   BP: 113/79   Site: Right Upper Arm   Position: Sitting   Cuff Size: Medium Adult   Pulse: 90   Temp: 97.2 °F (36.2 °C)   TempSrc: Infrared   Weight: 169 lb (76.7 kg)   Height: 5' (1.524 m)       PHYSICAL EXAM  /79 (Site: Right Upper Arm, Position: Sitting, Cuff Size: Medium Adult)   Pulse 90   Temp 97.2 °F (36.2 °C) (Infrared)   Ht 5' (1.524 m)   Wt 169 lb (76.7 kg)   BMI 33.01 kg/m²     GENERAL: No Acute Distress, Alert and Oriented, no hoarseness  EYES: EOMI, Anti-icteric  NOSE: On anterior rhinoscopy there is no epistaxis, nasal mucosa within normal limits, no purulent drainage  EARS: Normal external appearance; on portable otomicroscopy:  -Right ear: External auditory canal without stenosis, tympanic membrane clear, no middle ear effusions or retractions  -Left ear: External auditory canal without stenosis, tympanic membrane clear, no middle ear effusions or retractions  -Pneumatic otoscopy: With pneumatic otoscopy there is good mobility of the right tympanic membrane, there is good mobility of the left tympanic membrane, there is no induced vertigo with pneumatic otoscopy  FACE: 1/6 House-Brackmann Scale, symmetric, sensation equal bilaterally  ORAL CAVITY: No masses or lesions palpated, uvula is midline, moist mucous membranes, 0+ tonsils , good dentition  -Dental mirror exam: Base of tongue with no masses, uvula anatomically normal, epiglottis visualized. NECK: Normal range of motion, no thyromegaly, trachea is midline, no lymphadenopathy, no neck masses, no crepitus. Palpation of the anterior the neck elicits slight discomfort, sensation of \"gagging\", no sabas pain produced.   CHEST: Normal respiratory effort, no retractions, breathing comfortably  SKIN: No rashes, normal appearing skin, no evidence of skin lesions/tumors  NEURO: CN 2, 3, 4, 5, 6, 7, 11, 12 intact bilaterally     Data/Imaging Review     Reviewed read of ultrasound with patient. Normal size, no nodules. Procedure     Flexible Laryngoscopy    Pre op: Neck fullness, discomfort. Post op: Possible laryngopharyngeal reflux  Procedure : Flexible Nasopharyngolaryngoscopy  Surgeon: NERIS Buitrago  Anesthesia: Afrin with 2% lidocaine  Estimated Blood Loss: None    Procedure:   Flexible Laryngoscopy     After obtaining consent, the patient was placed in the examination chair in the upright position. Decongestant and topical anesthetic was sprayed in the After allowing adequate time for hemostatic effect, the flexible 4 mm laryngoscope was passed via the left nasal passage    Nasal Septum: No acute septal deviation, no septal hematoma or perforations noted   Nasal Findings: No active hemorrhage, no nasal masses appreciated   Nasopharynx: Clear, no masses however cobblestoning of the mucosa appreciate  Oropharynx: Bilateral tonsillar tissue absent, no exophytic masses  Base of Tongue: Lingual tonsils within normal limits, vallecula without effacement  Epiglottis: Lightly retroflexed, otherwise in normal anatomical position  True Vocal Cord: Anatomically normal, no masses or inflammation, normal abduction and adduction. Subglottic edema and thick endolaryngeal mucus appreciated  False Vocal Cord: normal   Hypopharynx Mucosa: No masses or inflammation of the piriform sinuses or postcricoid area  Arytenoids: Normal mucosa, slight interarytenoid pachydermia, no dislocation appreciated     * Patient tolerated the procedure well with no complications   * Patient was instructed not to eat for 30 minutes following procedure. * Patient was instructed that they may notice minor bleeding.     Attestation:   I was present for the entire viewing, including introduction and removal of the scope. Assessment and Plan     1. Laryngopharyngeal reflux (LPR)  Patient with history of of laryngal pharyngeal reflux following ibuprofen use, initially came to our office for anterior neck pain with globus sensation and discomfort on palpation. On examination of the vocal cords we appreciate subglottic edema, interarytenoid pachydermia and thick endolaryngeal mucus. And of itself, this would not be cause for treatment of LPR, however combined with her subjective symptoms of water brash we will start her on ACE/nightly therapy. She will see us back in 6 weeks for this. - omeprazole (PRILOSEC) 40 MG delayed release capsule; Take 1 capsule by mouth every morning (before breakfast)  Dispense: 90 capsule; Refill: 1  - nizatidine (AXID) 150 MG capsule; Take 1 capsule by mouth nightly  Dispense: 90 capsule; Refill: 1    2. Globus pharyngeus  May be a component of reflux-we do not appreciate any masses, although on palpation of the left aspect of the cricoid cartilage the patient states slight gagging/discomfort. 3. Neck discomfort  As above-no masses identified, symptoms have moderate improved (50%) over the last few weeks without therapy. Reflux suspected, if no improvement on antireflux therapy will consider imaging to look for a mass. No follow-ups on file. Alyx Barrera MD  [facility]  Department of Otolaryngology/Head and Neck Surgery  11/12/20    I have performed a head and neck physical exam personally or was physically present during the key or critical portions of the service. Medical Decision Making:   The following items were considered in medical decision making:  Independent review of images  Review / order clinical lab tests  Review / order radiology tests  Decision to obtain old records  Review and summation of old records as accessed through [facility] (a summary of my findings in these old records: None)     Portions of this note were dictated using Dragon.  There may be linguistic errors secondary to the use of this program.

## 2020-11-16 ENCOUNTER — TELEPHONE (OUTPATIENT)
Dept: ENT CLINIC | Age: 25
End: 2020-11-16

## 2020-11-18 ENCOUNTER — TELEPHONE (OUTPATIENT)
Dept: ENT CLINIC | Age: 25
End: 2020-11-18

## 2020-11-18 RX ORDER — FAMOTIDINE 40 MG/1
40 TABLET, FILM COATED ORAL EVERY EVENING
Qty: 30 TABLET | Refills: 3 | Status: SHIPPED | OUTPATIENT
Start: 2020-11-18 | End: 2021-03-29 | Stop reason: ALTCHOICE

## 2020-11-18 NOTE — TELEPHONE ENCOUNTER
Called patient to discuss symptoms - continues to have intermittent neck fullness, along with LPR especially after she eats spicy food and in the morning; has been on omeprazole for several days. The patient inquired about levothyroxine and omeprazole-we stated that she could take both, but should separate them in time by recent hour (take the levothyroxine prior to omeprazole).   We will start her on famotidine nightly for improved control of LPR overnight/when she wakes-we will see her back 12/2020

## 2020-12-03 ENCOUNTER — OFFICE VISIT (OUTPATIENT)
Dept: ENDOCRINOLOGY | Age: 25
End: 2020-12-03
Payer: COMMERCIAL

## 2020-12-03 VITALS
WEIGHT: 168.6 LBS | HEART RATE: 88 BPM | HEIGHT: 61 IN | BODY MASS INDEX: 31.83 KG/M2 | SYSTOLIC BLOOD PRESSURE: 114 MMHG | DIASTOLIC BLOOD PRESSURE: 87 MMHG | OXYGEN SATURATION: 98 %

## 2020-12-03 DIAGNOSIS — E06.3 HASHIMOTO'S THYROIDITIS: ICD-10-CM

## 2020-12-03 DIAGNOSIS — E55.9 VITAMIN D DEFICIENCY: ICD-10-CM

## 2020-12-03 DIAGNOSIS — E28.2 PCOS (POLYCYSTIC OVARIAN SYNDROME): ICD-10-CM

## 2020-12-03 DIAGNOSIS — E06.3 HYPOTHYROIDISM DUE TO HASHIMOTO'S THYROIDITIS: ICD-10-CM

## 2020-12-03 DIAGNOSIS — E03.8 HYPOTHYROIDISM DUE TO HASHIMOTO'S THYROIDITIS: ICD-10-CM

## 2020-12-03 LAB
A/G RATIO: 1.5 (ref 1.1–2.2)
ALBUMIN SERPL-MCNC: 4.6 G/DL (ref 3.4–5)
ALP BLD-CCNC: 87 U/L (ref 40–129)
ALT SERPL-CCNC: 30 U/L (ref 10–40)
ANION GAP SERPL CALCULATED.3IONS-SCNC: 11 MMOL/L (ref 3–16)
AST SERPL-CCNC: 49 U/L (ref 15–37)
BILIRUB SERPL-MCNC: 0.3 MG/DL (ref 0–1)
BUN BLDV-MCNC: 6 MG/DL (ref 7–20)
CALCIUM SERPL-MCNC: 9.8 MG/DL (ref 8.3–10.6)
CHLORIDE BLD-SCNC: 99 MMOL/L (ref 99–110)
CO2: 27 MMOL/L (ref 21–32)
CREAT SERPL-MCNC: <0.5 MG/DL (ref 0.6–1.1)
GFR AFRICAN AMERICAN: >60
GFR NON-AFRICAN AMERICAN: >60
GLOBULIN: 3 G/DL
GLUCOSE BLD-MCNC: 85 MG/DL (ref 70–99)
POTASSIUM SERPL-SCNC: 5.2 MMOL/L (ref 3.5–5.1)
SODIUM BLD-SCNC: 137 MMOL/L (ref 136–145)
TOTAL PROTEIN: 7.6 G/DL (ref 6.4–8.2)
TSH SERPL DL<=0.05 MIU/L-ACNC: 0.29 UIU/ML (ref 0.27–4.2)
VITAMIN D 25-HYDROXY: 33.2 NG/ML

## 2020-12-03 PROCEDURE — 99214 OFFICE O/P EST MOD 30 MIN: CPT | Performed by: INTERNAL MEDICINE

## 2020-12-03 PROCEDURE — G8417 CALC BMI ABV UP PARAM F/U: HCPCS | Performed by: INTERNAL MEDICINE

## 2020-12-03 PROCEDURE — G8427 DOCREV CUR MEDS BY ELIG CLIN: HCPCS | Performed by: INTERNAL MEDICINE

## 2020-12-03 PROCEDURE — 1036F TOBACCO NON-USER: CPT | Performed by: INTERNAL MEDICINE

## 2020-12-03 PROCEDURE — G8484 FLU IMMUNIZE NO ADMIN: HCPCS | Performed by: INTERNAL MEDICINE

## 2020-12-03 RX ORDER — SOLRIAMFETOL 150 MG/1
TABLET, FILM COATED ORAL DAILY
COMMUNITY
Start: 2020-11-25

## 2020-12-03 ASSESSMENT — ENCOUNTER SYMPTOMS
BACK PAIN: 0
COUGH: 0
SHORTNESS OF BREATH: 0
SORE THROAT: 0
NAUSEA: 0
VOMITING: 0
ABDOMINAL PAIN: 0

## 2020-12-03 NOTE — PROGRESS NOTES
Endocrinology  Emmanuel Feliz M.D. Phone: [phone]   FAX: [phone]       Leobardo Metcalf   YOB: 1995    Date of Visit:  12/3/2020    Allergies   Allergen Reactions    Latex Rash     Outpatient Medications Marked as Taking for the 12/3/20 encounter (Office Visit) with Jeff An MD   Medication Sig Dispense Refill    famotidine (PEPCID) 40 MG tablet Take 1 tablet by mouth every evening 30 tablet 3    omeprazole (PRILOSEC) 40 MG delayed release capsule Take 1 capsule by mouth every morning (before breakfast) 90 capsule 1    nizatidine (AXID) 150 MG capsule Take 1 capsule by mouth nightly 90 capsule 1    sertraline (ZOLOFT) 25 MG tablet Take 1 tablet by mouth daily 30 tablet 5    SALLIE 3-0.03 MG TABS TAKE ONE TABLET BY MOUTH DAILY 28 tablet 1    levothyroxine (SYNTHROID) 75 MCG tablet       methylphenidate (RITALIN LA) 20 MG extended release capsule       methylphenidate (RITALIN) 10 MG tablet       albuterol sulfate HFA (VENTOLIN HFA) 108 (90 Base) MCG/ACT inhaler Inhale 2 puffs into the lungs 4 times daily as needed for Wheezing 1 Inhaler 5    hydrocortisone 2.5 % ointment Apply topically 2 times daily Apply topically 2 times daily.  clindamycin (CLEOCIN T) 1 % lotion Apply topically 2 times daily Apply topically 2 times daily. Vitals:    12/03/20 0955   BP: 114/87   Site: Right Upper Arm   Position: Sitting   Cuff Size: Medium Adult   Pulse: 88   SpO2: 98%   Weight: 168 lb 9.6 oz (76.5 kg)   Height: 5' 1\" (1.549 m)     Body mass index is 31.86 kg/m².      Wt Readings from Last 3 Encounters:   12/03/20 168 lb 9.6 oz (76.5 kg)   11/12/20 169 lb (76.7 kg)   10/23/20 169 lb (76.7 kg)     BP Readings from Last 3 Encounters:   12/03/20 114/87   11/12/20 113/79   10/23/20 121/80        Past Medical History:   Diagnosis Date    Allergic rhinitis     Asthma     Dizziness     Hashimoto's thyroiditis     Headache     HPV (human papilloma virus) infection     Hypersomnia     PCOS (polycystic ovarian syndrome)      Past Surgical History:   Procedure Laterality Date    BREAST ENHANCEMENT SURGERY      COLPOSCOPY      TONSILLECTOMY AND ADENOIDECTOMY       Family History   Problem Relation Age of Onset    Cancer Mother     Diabetes Father     Cancer Maternal Aunt     Cancer Paternal Aunt     Cancer Maternal Grandmother      Social History     Tobacco Use   Smoking Status Never Smoker   Smokeless Tobacco Never Used      Social History     Substance and Sexual Activity   Alcohol Use Yes       HPI      Nancy Calderon is a 30 y.o. female who was seen for management of thyroid disease. Werner Diallo DO     Was seeing Namita Murray MD at [facility]      Patient has a PMH of hypothyroidism, PCOS, obesity, vitamin D , hypersomnia. Diagnosed with Hypothyroidism in her early 19 yrs. Was diagnosed with hashimoto's thyroiditis at the age of 10 yrs. Current thyroid medication: levothyroxine 75 mcg daily. Takes it first thing in the morning on empty stomach      FH of hypothyroidism: Aunt . FH of thyroid cancer: No       C/o Fatigue, weight gain   C/o cold intolerance,  C/o  hair loss, dry skin,   No constipation         PCOS : Has been diagnosed with PCOS in her teens. Reports high testosterone and cysts on her ovaries at the age of 13 yrs. She has been on  birth control since age of 13 yrs  No plans for fertility at this point. Never used metformin. Has h/o hirsutism. On birth control. Could not tolerate aldactone     Has hair on her face ( side buns ) . Gets those removed regularly. Obesity : has tried sexanda in the past and lost weight. Stopped as she did not want to take daily shot . She works for [employer] ([employer]) at the [employer]. Review of Systems   HENT: Negative for sore throat. Respiratory: Negative for cough and shortness of breath. Cardiovascular: Negative for chest pain and palpitations.    Gastrointestinal: Negative for abdominal pain, nausea and vomiting. Endocrine: Negative for polydipsia. Genitourinary: Negative for frequency and urgency. Musculoskeletal: Negative for back pain and myalgias. Skin: Negative for rash. Neurological: Negative for headaches. Psychiatric/Behavioral: The patient is not nervous/anxious. Brief exam   Patient alert,  Awake, oriented. Normal speech  No distress noted  Normal eyes   Normal hearing  Normal  hand movements. Assessment/Plan      1. Hypothyroidism     This 30 yrs old female has hypothyroidism    Etiology is hashimoto's thyroiditis     TSH 1.75 in 08/19.---> 0.86 ( 08/20 )       She is currently on levothyroxine 75 mcg daily    Will repeat labs with her today       2. PCOS. Continue birth control. No fertility desired at this point. 3. Obesity. Continue life style changes. Has used saxenda in the past       4. Vitamin D def. Will check her levels.      Results via [employer]

## 2020-12-22 RX ORDER — DROSPIRENONE AND ETHINYL ESTRADIOL 0.03MG-3MG
KIT ORAL
Qty: 28 TABLET | Refills: 0 | Status: SHIPPED | OUTPATIENT
Start: 2020-12-22 | End: 2021-01-19

## 2020-12-23 ENCOUNTER — OFFICE VISIT (OUTPATIENT)
Dept: ENT CLINIC | Age: 25
End: 2020-12-23
Payer: COMMERCIAL

## 2020-12-23 VITALS
BODY MASS INDEX: 33.61 KG/M2 | DIASTOLIC BLOOD PRESSURE: 89 MMHG | TEMPERATURE: 97.5 F | HEART RATE: 96 BPM | HEIGHT: 60 IN | WEIGHT: 171.2 LBS | SYSTOLIC BLOOD PRESSURE: 129 MMHG

## 2020-12-23 PROCEDURE — 99213 OFFICE O/P EST LOW 20 MIN: CPT | Performed by: OTOLARYNGOLOGY

## 2020-12-23 RX ORDER — FLUTICASONE PROPIONATE 50 MCG
1 SPRAY, SUSPENSION (ML) NASAL 2 TIMES DAILY
Qty: 1 BOTTLE | Refills: 2 | Status: SHIPPED | OUTPATIENT
Start: 2020-12-23

## 2020-12-23 NOTE — PROGRESS NOTES
[address] [facility] SURGERY  NEW PATIENT HISTORY AND PHYSICAL NOTE      Patient Name: Minal Rm  Medical Record Number:  [id number]  Primary Care Physician:  Constance Preston DO    ChiefComplaint     Chief Complaint   Patient presents with    Follow-up     States the pressure in her neck has gotten better, not as consistant nd intense but is till there. Twice since her last visit she has felt a \"minty\" feeling in her stomach, also states on the roof of her mouth and her uvul looked red and would itch and look purple/red. History of Present Illness     Minal Rm is an 30 y.o. female with history of anterior neck discomfort without sabas pain that is been ongoing for the past 2 weeks, with no incident infection or trauma. Symptoms are intermittent in nature, although she states they are worse when she is sitting upright-cannot lying supine. No hoarseness, dysphagia/odynophagia. She has prior history of aural fullness and pain (improved with Flonase-ETD?)  However no referred otalgia or aural fullness or hearing loss at this time. Prior history of Hashimoto's thyroiditis, recent thyroid ultrasound without active thyroiditis or thyroid nodules. Has history of thyroid disease in the family however no history of thyroidectomy / thyroid cancer. Prior history of cervical HPV, states that at last OB/Gyn visit she was told it had cleared. She has prior history of tonsillectomy in 2019 for a papillomatous mass on the right tonsil. Interval History 11/12/2020: 50% improvement in anterior neck discomfort - feels fullness/\"gagging\". Ibuprofen markedly worsened symptoms of reflux, which are worse in the middle of the day if she does not eat. Drinks 3-4c of coffee weekly, 6 beers weekly, 1-2 sodas weekly. Interval History 12/23/2020: Continues to have improvement in anterior neck discomfort. Has cut down on coffee, now only drinking 1-2 cups every 2 weeks.   Continues to drink 3-4 beers weekly, several sodas weekly. Denies tobacco use. She states that twice during the past month she has had a \"minty\" feeling in her stomach that lasts several hours, then resolved. She also states intermittent soreness over the left soft palate close to the uvula, along with postnasal drip that she says occurs regularly over the year but is worse in the winter. Past Medical History     Past Medical History:   Diagnosis Date    Allergic rhinitis     Asthma     Dizziness     Hashimoto's thyroiditis     Headache     HPV (human papilloma virus) infection     Hypersomnia     PCOS (polycystic ovarian syndrome)        Past Surgical History     Past Surgical History:   Procedure Laterality Date    BREAST ENHANCEMENT SURGERY      COLPOSCOPY      TONSILLECTOMY AND ADENOIDECTOMY         Family History     Family History   Problem Relation Age of Onset    Cancer Mother     Diabetes Father     Cancer Maternal Aunt     Cancer Paternal Aunt     Cancer Maternal Grandmother        Social History     Social History     Tobacco Use    Smoking status: Never Smoker    Smokeless tobacco: Never Used   Substance Use Topics    Alcohol use:  Yes    Drug use: Not Currently        Allergies     Allergies   Allergen Reactions    Latex Rash       Medications     Current Outpatient Medications   Medication Sig Dispense Refill    fluticasone (FLONASE) 50 MCG/ACT nasal spray 1 spray by Each Nostril route 2 times daily 1 Bottle 2    SALLIE 3-0.03 MG TABS TAKE ONE TABLET BY MOUTH DAILY 28 tablet 0    SUNOSI 150 MG TABS       famotidine (PEPCID) 40 MG tablet Take 1 tablet by mouth every evening 30 tablet 3    omeprazole (PRILOSEC) 40 MG delayed release capsule Take 1 capsule by mouth every morning (before breakfast) 90 capsule 1    sertraline (ZOLOFT) 25 MG tablet Take 1 tablet by mouth daily 30 tablet 5    levothyroxine (SYNTHROID) 75 MCG tablet       methylphenidate (RITALIN LA) 20 MG extended release capsule       methylphenidate (RITALIN) 10 MG tablet       albuterol sulfate HFA (VENTOLIN HFA) 108 (90 Base) MCG/ACT inhaler Inhale 2 puffs into the lungs 4 times daily as needed for Wheezing 1 Inhaler 5    hydrocortisone 2.5 % ointment Apply topically 2 times daily Apply topically 2 times daily.  clindamycin (CLEOCIN T) 1 % lotion Apply topically 2 times daily Apply topically 2 times daily. No current facility-administered medications for this visit.         Review of Systems     REVIEW OF SYSTEMS  The following systems were reviewed and revealed the following in addition to any already discussed in the HPI:    CONSTITUTIONAL: no  weight loss, no fever, no night sweats, no chills  EYES: no vision changes, no blurry vision  EARS: no changes in hearing, no otalgia  NOSE: no epistaxis, no rhinorrhea  RESPIRATORY: no difficulty breathing, no shortness of breath  CV: no chest pain, no peripheral vascular disease  HEME: No coagulation disorder, no bleeding disorder  NEURO: No TIA or stroke-like symptoms  SKIN: No new rashes in the head and neck, no recent skin cancers  MOUTH: No new ulcers, no recent teeth infections  GASTROINTESTINAL: No diarrhea, stomach pain  PSYCH: No anxiety, no depression    PhysicalExam     Vitals:    12/23/20 0853   BP: 129/89   Site: Left Upper Arm   Position: Sitting   Cuff Size: Medium Adult   Pulse: 96   Temp: 97.5 °F (36.4 °C)   TempSrc: Infrared   Weight: 171 lb 3.2 oz (77.7 kg)   Height: 5' (1.524 m)       PHYSICAL EXAM  /89 (Site: Left Upper Arm, Position: Sitting, Cuff Size: Medium Adult)   Pulse 96   Temp 97.5 °F (36.4 °C) (Infrared)   Ht 5' (1.524 m)   Wt 171 lb 3.2 oz (77.7 kg)   BMI 33.44 kg/m²     GENERAL: No Acute Distress, Alert and Oriented, no hoarseness  EYES: EOMI, Anti-icteric  NOSE: On anterior rhinoscopy there is no epistaxis, nasal mucosa within normal limits, no purulent drainage  EARS: Normal external appearance; on portable otomicroscopy:  -Right ear: External auditory canal without stenosis, tympanic membrane clear, no middle ear effusions or retractions  -Left ear: External auditory canal without stenosis, tympanic membrane clear, no middle ear effusions or retractions  -Pneumatic otoscopy: With pneumatic otoscopy there is good mobility of the right tympanic membrane, there is good mobility of the left tympanic membrane, there is no induced vertigo with pneumatic otoscopy  FACE: 1/6 House-Brackmann Scale, symmetric, sensation equal bilaterally  ORAL CAVITY: No masses or lesions palpated, uvula is midline, moist mucous membranes, 0+ tonsils , good dentition. Slight irrigation palpable over the left soft palate but no   -Dental mirror exam: Base of tongue with no masses, uvula anatomically normal, epiglottis visualized. NECK: Normal range of motion, no thyromegaly, trachea is midline, no lymphadenopathy, no neck masses, no crepitus. Palpation of the anterior the neck elicits slight discomfort, no sabas pain produced. CHEST: Normal respiratory effort, no retractions, breathing comfortably  SKIN: No rashes, normal appearing skin, no evidence of skin lesions/tumors  NEURO: CN 2, 3, 4, 5, 6, 7, 11, 12 intact bilaterally     Data/Imaging Review     Reviewed read of ultrasound with patient. Normal size, no nodules. Procedure     None    Assessment and Plan     1. Laryngopharyngeal reflux (LPR)  Patient with history of of laryngal pharyngeal reflux following ibuprofen use, initially came to our office for anterior neck pain with globus sensation and discomfort on palpation. On examination of the vocal cords we appreciate subglottic edema, interarytenoid pachydermia and thick endolaryngeal mucus. She has been started on omeprazole/nizatidine with improvement in symptoms, and in the interim she has also significantly reduced caffeine and EtOH use. We will therefore decrease her nizatidine, and keep her on daily omeprazole.  She will follow up in 3 months. - omeprazole (PRILOSEC) 40 MG delayed release capsule; Take 1 capsule by mouth every morning (before breakfast)  Dispense: 90 capsule; Refill: 1  - nizatidine (AXID) 150 MG capsule; Take 1 capsule by mouth nightly  Dispense: 90 capsule; Refill: 1    2. Globus pharyngeus  May be a component of reflux-we do not appreciate any masses, although on palpation of the left aspect of the cricoid cartilage the patient states slight gagging/discomfort. Appears to have improved. 3. Neck discomfort  As above-no masses identified, symptoms have significant improvement over the last few weeks without therapy. Reflux suspected, if no improvement on antireflux therapy will consider imaging to look for a mass. 4. Post-nasal drip   Patient with intermittent post-nasal drip - will start flonase BID. We believe this is responsible for the soft palate irritation as she states with worsening post-nasal drip, there is worsening irritation. Return in about 3 months (around 3/23/2021). Jason Lazaro MD  [facility]  Department of Otolaryngology/Head and Neck Surgery  12/23/20    I have performed a head and neck physical exam personally or was physically present during the key or critical portions of the service. Medical Decision Making: The following items were considered in medical decision making:  Independent review of images  Review / order clinical lab tests  Review / order radiology tests  Decision to obtain old records  Review and summation of old records as accessed through [facility] (a summary of my findings in these old records: None)     Portions of this note were dictated using Dragon.  There may be linguistic errors secondary to the use of this program.

## 2020-12-23 NOTE — PATIENT INSTRUCTIONS
-Start flonase twice daily (one spray in each nostril in the mornings and evenings)  -Stop evening anti-reflux medicine; continue omeprazole daily (30 minutes before first meal)  -Notify our clinic with any worsening throat discomfort

## 2021-01-19 ENCOUNTER — TELEPHONE (OUTPATIENT)
Dept: GYNECOLOGY | Age: 26
End: 2021-01-19

## 2021-01-19 DIAGNOSIS — Z30.011 ENCOUNTER FOR INITIAL PRESCRIPTION OF CONTRACEPTIVE PILLS: ICD-10-CM

## 2021-01-19 DIAGNOSIS — Z87.42 HISTORY OF PCOS: ICD-10-CM

## 2021-01-19 RX ORDER — DROSPIRENONE AND ETHINYL ESTRADIOL 0.03MG-3MG
KIT ORAL
Qty: 28 TABLET | Refills: 0 | Status: SHIPPED | OUTPATIENT
Start: 2021-01-19 | End: 2021-02-17

## 2021-02-10 ENCOUNTER — OFFICE VISIT (OUTPATIENT)
Dept: GYNECOLOGY | Age: 26
End: 2021-02-10
Payer: COMMERCIAL

## 2021-02-10 VITALS
OXYGEN SATURATION: 99 % | SYSTOLIC BLOOD PRESSURE: 126 MMHG | HEART RATE: 74 BPM | DIASTOLIC BLOOD PRESSURE: 80 MMHG | HEIGHT: 60 IN | WEIGHT: 175 LBS | TEMPERATURE: 97.8 F | BODY MASS INDEX: 34.36 KG/M2

## 2021-02-10 DIAGNOSIS — N76.1 SUBACUTE VAGINITIS: ICD-10-CM

## 2021-02-10 DIAGNOSIS — E83.52 HYPERCALCEMIA: ICD-10-CM

## 2021-02-10 DIAGNOSIS — Z01.419 WELL WOMAN EXAM WITH ROUTINE GYNECOLOGICAL EXAM: Primary | ICD-10-CM

## 2021-02-10 DIAGNOSIS — Z30.41 ENCOUNTER FOR SURVEILLANCE OF CONTRACEPTIVE PILLS: ICD-10-CM

## 2021-02-10 LAB
BACTERIA WET PREP: ABNORMAL
CLUE CELLS: ABNORMAL
EPITHELIAL CELLS WET PREP: ABNORMAL
RBC WET PREP: ABNORMAL
SOURCE WET PREP: ABNORMAL
TRICHOMONAS PREP: ABNORMAL
WBC WET PREP: ABNORMAL
YEAST WET PREP: ABNORMAL

## 2021-02-10 PROCEDURE — 99385 PREV VISIT NEW AGE 18-39: CPT | Performed by: OBSTETRICS & GYNECOLOGY

## 2021-02-10 RX ORDER — FLUCONAZOLE 150 MG/1
150 TABLET ORAL
Qty: 2 TABLET | Refills: 0 | Status: SHIPPED | OUTPATIENT
Start: 2021-02-10 | End: 2021-02-16

## 2021-02-10 ASSESSMENT — ENCOUNTER SYMPTOMS
SORE THROAT: 0
COUGH: 0
TROUBLE SWALLOWING: 0
PHOTOPHOBIA: 0
BLOOD IN STOOL: 0
DIARRHEA: 0
CHEST TIGHTNESS: 0
CONSTIPATION: 0
APNEA: 0
RECTAL PAIN: 0
NAUSEA: 0
ANAL BLEEDING: 0
SHORTNESS OF BREATH: 0
ABDOMINAL PAIN: 0
VOMITING: 0
WHEEZING: 0
BACK PAIN: 0
COLOR CHANGE: 0
ABDOMINAL DISTENTION: 0

## 2021-02-10 NOTE — PROGRESS NOTES
Annual GYN Visit    Gui Buck  Date ofBirth:  1995    Date of Service:  2/10/2021    Chief Complaint:   Gui Buck is a 30 y.o. [de-identified]  female who presents for routine annual gynecologic visit. HPI:  Patient is premenopausal- Patient's last menstrual period was 2021 (approximate). .  She is here for routine annual exam, reports history of PCOS and is currently on nas for cycle regulation. Has issues with increased hair growth prior to OC and it has improved with nas.  Labs done 2020 -> potassium elevated 5.2. she has been on spironolactone previously and had side-effects with it     Health Maintenance   Topic Date Due    Hepatitis C screen  1995    Varicella vaccine (1 of 2 - 2-dose childhood series) 1994    Pneumococcal 0-64 years Vaccine (1 of 1 - PPSV23) 1999    HIV screen  2008    Cervical cancer screen  2014    Flu vaccine (1) 2020    DTaP/Tdap/Td vaccine (2 - Td) 10/03/2024    COVID-19 Vaccine  Completed    Hepatitis A vaccine  Aged Out    Hepatitis B vaccine  Aged Out    Hib vaccine  Aged Out    Meningococcal (ACWY) vaccine  Aged Out       Past Medical History:   Diagnosis Date    Allergic rhinitis     Asthma     Dizziness     Hashimoto's thyroiditis     Headache     HPV (human papilloma virus) infection     Hypersomnia     PCOS (polycystic ovarian syndrome)      Past Surgical History:   Procedure Laterality Date    BREAST ENHANCEMENT SURGERY      COLPOSCOPY      TONSILLECTOMY AND ADENOIDECTOMY       OB History    Para Term  AB Living   0 0 0 0 0 0   SAB TAB Ectopic Molar Multiple Live Births   0 0 0 0 0 0     Social History     Socioeconomic History    Marital status: Single     Spouse name: Not on file    Number of children: Not on file    Years of education: Not on file    Highest education level: Not on file   Occupational History    Not on file   Social Needs    Financial resource strain: Not on file   [address] [place] [address] Food insecurity     Worry: Not on file     Inability: Not on file    Transportation needs     Medical: Not on file     Non-medical: Not on file   Tobacco Use    Smoking status: Never Smoker    Smokeless tobacco: Never Used   Substance and Sexual Activity    Alcohol use:  Yes    Drug use: Not Currently    Sexual activity: Yes     Partners: Male   Lifestyle    Physical activity     Days per week: Not on file     Minutes per session: Not on file    Stress: Not on file   Relationships    Social connections     Talks on phone: Not on file     Gets together: Not on file     Attends Yarsani service: Not on file     Active member of club or organization: Not on file     Attends meetings of clubs or organizations: Not on file     Relationship status: Not on file    Intimate partner violence     Fear of current or ex partner: Not on file     Emotionally abused: Not on file     Physically abused: Not on file     Forced sexual activity: Not on file   Other Topics Concern    Not on file   Social History Narrative    Not on file     Allergies   Allergen Reactions    Latex Rash     Outpatient Medications Marked as Taking for the 2/10/21 encounter (Office Visit) with Evelyn Hawthorne MD   Medication Sig Dispense Refill    fluconazole (DIFLUCAN) 150 MG tablet Take 1 tablet by mouth every 72 hours for 6 days 2 tablet 0    SALLIE 3-0.03 MG TABS TAKE ONE TABLET BY MOUTH DAILY 28 tablet 0    fluticasone (FLONASE) 50 MCG/ACT nasal spray 1 spray by Each Nostril route 2 times daily 1 Bottle 2    SUNOSI 150 MG TABS       sertraline (ZOLOFT) 25 MG tablet Take 1 tablet by mouth daily 30 tablet 5    levothyroxine (SYNTHROID) 75 MCG tablet       methylphenidate (RITALIN LA) 20 MG extended release capsule       methylphenidate (RITALIN) 10 MG tablet       albuterol sulfate HFA (VENTOLIN HFA) 108 (90 Base) MCG/ACT inhaler Inhale 2 puffs into the lungs 4 times daily as needed for Wheezing 1 Inhaler 5     Family History 34.18 kg/m². Physical Exam  Constitutional:       Appearance: She is well-developed. HENT:      Head: Normocephalic and atraumatic. Neck:      Musculoskeletal: Normal range of motion and neck supple. Cardiovascular:      Rate and Rhythm: Normal rate. Pulmonary:      Effort: No respiratory distress. Abdominal:      General: Bowel sounds are normal. There is no distension. Palpations: Abdomen is soft. Tenderness: There is no guarding or rebound. Genitourinary:     Labia:         Right: No rash, tenderness or lesion. Left: No rash, tenderness or lesion. Vagina: Vaginal discharge (thick white discharge present ) present. Cervix: No cervical motion tenderness or discharge. Adnexa:         Right: No mass or tenderness. Left: No mass or tenderness. Skin:     General: Skin is warm. Neurological:      Mental Status: She is alert and oriented to person, place, and time. Psychiatric:         Behavior: Behavior normal.           Assessment/Plan:  1. Well woman exam with routine gynecological exam  - PAP SMEAR  Self breast exam discussed and encouraged  Diet and exercise discussed  Discussed daily multi-vitamin and adequate calcium and vitamin D in diet  Safe sex and usage of condoms discussed   BCM- nas    2. Subacute vaginitis  Diflucan sent to pharmacy   - C.trachomatis N.gonorrhoeae DNA  - Wet prep, genital  - Culture, Ureaplasma/Mycoplasma hominis    3. Encounter for surveillance of contraceptive pills  On nas, will recheck potassium level and if elevated switch to another pill     4. Hypercalcemia  Could be secondary to nas   - BASIC METABOLIC PANEL;  Future      Return if symptoms worsen or fail to improve, for ANNUAL EXAM.

## 2021-02-11 DIAGNOSIS — B96.89 BACTERIAL VAGINITIS: Primary | ICD-10-CM

## 2021-02-11 DIAGNOSIS — N76.0 BACTERIAL VAGINITIS: Primary | ICD-10-CM

## 2021-02-11 LAB
C TRACH DNA GENITAL QL NAA+PROBE: NEGATIVE
N. GONORRHOEAE DNA: NEGATIVE

## 2021-02-11 RX ORDER — METRONIDAZOLE 7.5 MG/G
GEL VAGINAL
Qty: 70 G | Refills: 0 | Status: SHIPPED | OUTPATIENT
Start: 2021-02-11 | End: 2021-03-29 | Stop reason: ALTCHOICE

## 2021-02-13 DIAGNOSIS — Z87.42 HISTORY OF PCOS: ICD-10-CM

## 2021-02-13 DIAGNOSIS — Z30.011 ENCOUNTER FOR INITIAL PRESCRIPTION OF CONTRACEPTIVE PILLS: ICD-10-CM

## 2021-02-15 ENCOUNTER — HOSPITAL ENCOUNTER (OUTPATIENT)
Age: 26
Discharge: HOME | End: 2021-02-15
Payer: COMMERCIAL

## 2021-02-15 DIAGNOSIS — E83.52 HYPERCALCEMIA: ICD-10-CM

## 2021-02-15 LAB
ANION GAP SERPL CALCULATED.3IONS-SCNC: 13 MMOL/L (ref 3–16)
BUN BLDV-MCNC: 6 MG/DL (ref 7–20)
CALCIUM SERPL-MCNC: 9 MG/DL (ref 8.3–10.6)
CHLORIDE BLD-SCNC: 98 MMOL/L (ref 99–110)
CO2: 22 MMOL/L (ref 21–32)
CREAT SERPL-MCNC: <0.5 MG/DL (ref 0.6–1.1)
GFR AFRICAN AMERICAN: >60
GFR NON-AFRICAN AMERICAN: >60
GLUCOSE BLD-MCNC: 79 MG/DL (ref 70–99)
POTASSIUM SERPL-SCNC: 4.4 MMOL/L (ref 3.5–5.1)
SODIUM BLD-SCNC: 133 MMOL/L (ref 136–145)

## 2021-02-15 PROCEDURE — 80048 BASIC METABOLIC PNL TOTAL CA: CPT

## 2021-02-15 PROCEDURE — 36415 COLL VENOUS BLD VENIPUNCTURE: CPT

## 2021-02-17 LAB
FINAL REPORT: NORMAL
PRELIMINARY: NORMAL

## 2021-02-17 RX ORDER — DROSPIRENONE AND ETHINYL ESTRADIOL 0.03MG-3MG
KIT ORAL
Qty: 28 TABLET | Refills: 0 | Status: SHIPPED | OUTPATIENT
Start: 2021-02-17 | End: 2021-03-18

## 2021-02-19 DIAGNOSIS — N34.1 NGU DUE TO UREAPLASMA UREALYTICUM: Primary | ICD-10-CM

## 2021-02-19 DIAGNOSIS — A49.3 NGU DUE TO UREAPLASMA UREALYTICUM: Primary | ICD-10-CM

## 2021-02-19 RX ORDER — DOXYCYCLINE HYCLATE 100 MG
100 TABLET ORAL 2 TIMES DAILY
Qty: 14 TABLET | Refills: 0 | Status: SHIPPED | OUTPATIENT
Start: 2021-02-19 | End: 2021-02-26

## 2021-03-15 DIAGNOSIS — Z87.42 HISTORY OF PCOS: ICD-10-CM

## 2021-03-15 DIAGNOSIS — Z30.011 ENCOUNTER FOR INITIAL PRESCRIPTION OF CONTRACEPTIVE PILLS: ICD-10-CM

## 2021-03-18 RX ORDER — DROSPIRENONE AND ETHINYL ESTRADIOL 0.03MG-3MG
KIT ORAL
Qty: 28 TABLET | Refills: 0 | Status: SHIPPED | OUTPATIENT
Start: 2021-03-18 | End: 2021-04-14

## 2021-03-29 ASSESSMENT — PATIENT HEALTH QUESTIONNAIRE - PHQ9
SUM OF ALL RESPONSES TO PHQ9 QUESTIONS 1 & 2: 0
SUM OF ALL RESPONSES TO PHQ QUESTIONS 1-9: 0
2. FEELING DOWN, DEPRESSED OR HOPELESS: 0
1. LITTLE INTEREST OR PLEASURE IN DOING THINGS: 0

## 2021-03-30 ENCOUNTER — VIRTUAL VISIT (OUTPATIENT)
Dept: FAMILY MEDICINE CLINIC | Age: 26
End: 2021-03-30
Payer: COMMERCIAL

## 2021-03-30 DIAGNOSIS — Z98.82 BREAST IMPLANT IN SITU: ICD-10-CM

## 2021-03-30 DIAGNOSIS — N64.4 BREAST PAIN: Primary | ICD-10-CM

## 2021-03-30 DIAGNOSIS — R19.7 INTERMITTENT DIARRHEA: ICD-10-CM

## 2021-03-30 DIAGNOSIS — Z83.79 FAMILY HISTORY OF CROHN'S DISEASE: ICD-10-CM

## 2021-03-30 PROCEDURE — 99214 OFFICE O/P EST MOD 30 MIN: CPT | Performed by: FAMILY MEDICINE

## 2021-03-30 RX ORDER — DICYCLOMINE HYDROCHLORIDE 10 MG/1
10 CAPSULE ORAL 3 TIMES DAILY PRN
Qty: 30 CAPSULE | Refills: 5 | Status: SHIPPED | OUTPATIENT
Start: 2021-03-30 | End: 2021-06-24 | Stop reason: ALTCHOICE

## 2021-03-30 NOTE — PROGRESS NOTES
TELEHEALTH EVALUATION -- Audio/Visual (During CKKTW-58 public health emergency)    HPI:  The patient has requested an audio/video evaluation for the following concern(s):    Chief Complaint   Patient presents with    Breast Pain     L breast pain x several months - bilat implants     Irritable Bowel Syndrome     sensitivity test - issues all her life - noticed more since working from home, diarrhea 3x a day      Left breast, uncomfortable to lay on left side, painful  She reconstructive breast surgery 7 years ago and she is wondering if her implants have ruptured. Also she has irritable bowel she disease and she also has autoimmune disorder of Hashimoto's and a family history of Crohn's. She has been having more diarrhea lately especially yesterday was 3 times a day and also already today. She has never tried medication for it but did have a sigmoidoscopy which was normal.  BP Readings from Last 5 Encounters:   02/10/21 126/80   12/23/20 129/89   12/03/20 114/87   11/12/20 113/79   10/23/20 121/80       Review of Systems   As above  Allergic/Immunologic: Negative for immunocompromised state. Psychiatric/Behavioral: Negative for agitation, behavioral problems and confusion. Physical Exam    Constitutional: [x] Appears well-developed and well-nourished [x] No apparent distress      [] Abnormal-   Mental status  [x] Alert and awake  [x] Oriented to person/place/time [x]Able to follow commands      Eyes:  EOM    [x]  Normal  [] Abnormal-  Sclera  [x]  Normal  [] Abnormal -         Discharge [x]  None visible  [] Abnormal -    HENT:   [x] Normocephalic, atraumatic.   [] Abnormal   [] Mouth/Throat: Mucous membranes are moist.     External Ears [x] Normal  [] Abnormal-     Neck: [x] No visualized mass     Pulmonary/Chest: [x] Respiratory effort normal.  [x] No visualized signs of difficulty breathing or respiratory distress        [] Abnormal-    Able to speak in full sentences without difficulty Musculoskeletal:   [] Normal gait with no signs of ataxia         [x] Normal range of motion of neck        [] Abnormal-       Neurological:        [x] No Facial Asymmetry (Cranial nerve 7 motor function) (limited exam to video visit)          [x] No gaze palsy        [] Abnormal-         Skin:        [x] No significant exanthematous lesions or discoloration noted on facial skin         [] Abnormal-            Psychiatric:       [x] Normal Affect [] No Hallucinations        [] Abnormal-   Judgment, behavior, thought and mood are normal.       Diagnosis Orders   1. Breast pain  [facility], [place], [address], Breast Surgery, [facility] BREAST COMPLETE LEFT    US BREAST COMPLETE RIGHT   2. Breast implant in [address], [place], DO, Breast Surgery, [facility] BREAST COMPLETE LEFT    US BREAST COMPLETE RIGHT   3. Intermittent diarrhea  Allergen, Food, Comprehensive Profile 1    Celiac AG IGA/IGG Screen w Reflex   4. Family history of Crohn's disease  Celiac AG IGA/IGG Screen w Reflex     William Yoder was seen today for breast pain and irritable bowel syndrome. Diagnoses and all orders for this visit:    Breast pain  -     [facility], [place], [place], Breast Surgery, [facility] BREAST COMPLETE LEFT  -     US BREAST COMPLETE RIGHT    Breast implant in situ  -     [facility], [place], [place], Breast Surgery, [facility] BREAST COMPLETE LEFT  -     US BREAST COMPLETE RIGHT    Intermittent diarrhea  -     Allergen, Food, Comprehensive Profile 1; Future  -     Celiac AG IGA/IGG Screen w Reflex  -     dicyclomine (BENTYL) 10 MG capsule; Take 1 capsule by mouth 3 times daily as needed (cramps, diarrhea)  Family history of Crohn's disease  -     Celiac AG IGA/IGG Screen w Reflex                 (During BVZEI-01 public health emergency), evaluation of the following organ systems was limited: Vitals/Constitutional/EENT/Resp/CV/GI//MS/Neuro/Skin/Heme-Lymph-Imm.   Pursuant to the emergency declaration under the [facility], [address] [place] [address] authority and the [employer] and [employer] Act, this Virtual Visit was conducted with patient's (and/or legal guardian's) consent, to reduce the patient's risk of exposure to COVID-19 and provide necessary medical care. The patient (and/or legal guardian) has also been advised to contact this office for worsening conditions or problems, and seek emergency medical treatment and/or call 911 if deemed necessary. Patient initiated the encounter and gave consent for the encounter. Services were provided through a video synchronous discussion virtually to substitute for in-person clinic visit. Patient and provider were located at their individual homes. 30 minute time spent  Time spent today included for this patient visit includes time spent preparing to see the patient  Including review of tests, labs and imaging,   revewing previous history and recent encounters,   obtaining and/or reviewing separately obtained history in care everywhere or record,   performing a medically appropriate examination and/or evaluation;   counseling and educating the patient and /family/caregiver when present,  ordering medications, tests, or procedures;   referring to other health care specialists (when not separately reported);   documenting clinical information in the electronic health record;   independently interpreting results (not separately reported)   and communicating results to the patient.

## 2021-04-02 ENCOUNTER — TELEPHONE (OUTPATIENT)
Dept: SURGERY | Age: 26
End: 2021-04-02

## 2021-04-02 NOTE — TELEPHONE ENCOUNTER
Called pt. To go over new pt. Intake form, call went to  with no identifiers, unable to leave a message.

## 2021-04-06 NOTE — TELEPHONE ENCOUNTER
Attempted to reach patient to complete new patient intake forms prior to upcoming appointment. Unable to reach patient at this time.

## 2021-04-06 NOTE — PROGRESS NOTES
Gynecologic History  Menarche at age 11    She delivered her first child at age N/A, and N/A breastfeed  Premenopausal  No hysterectomy  Oral contraceptive use: yes for \"several\" years and is  currently taking  Hormone use: no and is not currently taking    Bra Size: 32/34 C        Review of Systems   Constitutional: Negative for unexpected weight change. Eyes: Negative for visual disturbance. Respiratory: Negative for cough and shortness of breath. Cardiovascular: Positive for palpitations (Related to anxiety). Negative for chest pain. Gastrointestinal: Positive for abdominal pain (IBS). Musculoskeletal: Positive for arthralgias (Bilat knee pain w/ redness and warmth- Has seen doctor) and myalgias (Back pains- States poor posture). Neurological: Positive for headaches (Related to medication). Hematological: Negative for adenopathy. Does not bruise/bleed easily. Psychiatric/Behavioral: Negative for dysphoric mood. The patient is nervous/anxious (On Zoloft).

## 2021-04-14 DIAGNOSIS — Z30.011 ENCOUNTER FOR INITIAL PRESCRIPTION OF CONTRACEPTIVE PILLS: ICD-10-CM

## 2021-04-14 DIAGNOSIS — Z87.42 HISTORY OF PCOS: ICD-10-CM

## 2021-04-14 RX ORDER — DROSPIRENONE AND ETHINYL ESTRADIOL 0.03MG-3MG
KIT ORAL
Qty: 28 TABLET | Refills: 0 | Status: SHIPPED | OUTPATIENT
Start: 2021-04-14 | End: 2021-05-12

## 2021-04-16 ENCOUNTER — OFFICE VISIT (OUTPATIENT)
Dept: SURGERY | Age: 26
End: 2021-04-16
Payer: COMMERCIAL

## 2021-04-16 ENCOUNTER — HOSPITAL ENCOUNTER (OUTPATIENT)
Dept: WOMENS IMAGING | Age: 26
Discharge: HOME | End: 2021-04-16
Payer: COMMERCIAL

## 2021-04-16 VITALS
WEIGHT: 180 LBS | HEIGHT: 60 IN | HEART RATE: 84 BPM | OXYGEN SATURATION: 98 % | RESPIRATION RATE: 16 BRPM | DIASTOLIC BLOOD PRESSURE: 89 MMHG | BODY MASS INDEX: 35.34 KG/M2 | TEMPERATURE: 97.6 F | SYSTOLIC BLOOD PRESSURE: 117 MMHG

## 2021-04-16 DIAGNOSIS — N64.4 BREAST PAIN: ICD-10-CM

## 2021-04-16 DIAGNOSIS — Z80.3 FAMILY HISTORY OF BREAST CANCER: Primary | ICD-10-CM

## 2021-04-16 DIAGNOSIS — Z98.82 BREAST IMPLANT IN SITU: ICD-10-CM

## 2021-04-16 PROCEDURE — 99212 OFFICE O/P EST SF 10 MIN: CPT | Performed by: SURGERY

## 2021-04-16 PROCEDURE — 76641 ULTRASOUND BREAST COMPLETE: CPT

## 2021-04-16 ASSESSMENT — ENCOUNTER SYMPTOMS
COUGH: 0
ABDOMINAL PAIN: 1
SHORTNESS OF BREATH: 0

## 2021-04-16 NOTE — PROGRESS NOTES
21    CHIEF COMPLAINT:  Evaluation of left breast pain      HISTORY OF PRESENT ILLNESS:  Lianna Riley is a 30 y.o. woman who is referred by Bill Roche DO who requested that I evaluate her for left breast pain. The pain first started approximately 1 year ago. It primarily occurs on the left lateral aspect of her breast.  She cannot really lie on the side due to the pain and it occasionally wakes her up from her sleep if she is on that side. The pain occurs almost daily. She has a history of bilateral breast augmentation and reconstruction for tuberous breasts in  (silicone implants, subpectoral). She is a non smoker. Her caffeine intake is minimal.   She has been working from home since the start of Covid and does not wear a bra when she is working at home. She has not tried anything to relieve the pain. The patient states that she herself had not noticed any abnormal masses in either breast.  She denies any change in the appearance of her breasts or the skin of her breasts. She denies bilateral nipple discharge. She has no other systemic complaints and otherwise feels well today. Pertinent Family History: Her mother was diagnosed with breast cancer at age 37. Her mother currently has stage IV breast cancer with metastases to her brain and is undergoing chemotherapy treatment through [address] [place] [address] in [facility]. Her maternal great grandmother was diagnosed with breast cancer at an unknown age. She has 3 paternal aunts who were diagnosed with breast cancer in their 45s.     Genetic Testing:  Negative genetic testing through [facility] (She believes her mother had positive genetic testing, but is unsure)    GYNECOLOGIC HISTORY:  Menarche at age 11    She delivered her first child at age N/A, and N/A breastfeed  Premenopausal  No hysterectomy  Oral contraceptive use: yes for \"several\" years and is  currently taking  Hormone use: no and is not currently taking    Past Medical History:   Diagnosis Date    Allergic rhinitis     Asthma     Breast cyst     Reports a cyst in right breast as a teenager- not sure of breast imaging     Dizziness     Hashimoto's thyroiditis     Headache     HPV (human papilloma virus) infection     Hypersomnia     PCOS (polycystic ovarian syndrome)      Past Surgical History:   Procedure Laterality Date    BREAST ENHANCEMENT SURGERY      COLPOSCOPY      TONSILLECTOMY AND ADENOIDECTOMY       Current Outpatient Medications   Medication Sig Dispense Refill    SALLIE 3-0.03 MG TABS TAKE ONE TABLET BY MOUTH DAILY 28 tablet 0    dicyclomine (BENTYL) 10 MG capsule Take 1 capsule by mouth 3 times daily as needed (cramps, diarrhea) 30 capsule 5    fluticasone (FLONASE) 50 MCG/ACT nasal spray 1 spray by Each Nostril route 2 times daily 1 Bottle 2    SUNOSI 150 MG TABS       sertraline (ZOLOFT) 25 MG tablet Take 1 tablet by mouth daily 30 tablet 5    levothyroxine (SYNTHROID) 75 MCG tablet       methylphenidate (RITALIN LA) 20 MG extended release capsule       methylphenidate (RITALIN) 10 MG tablet       albuterol sulfate HFA (VENTOLIN HFA) 108 (90 Base) MCG/ACT inhaler Inhale 2 puffs into the lungs 4 times daily as needed for Wheezing 1 Inhaler 5     No current facility-administered medications for this visit.       Allergies   Allergen Reactions    Latex Rash     Social History     Socioeconomic History    Marital status: Single     Spouse name: Not on file    Number of children: Not on file    Years of education: Not on file    Highest education level: Not on file   Occupational History    Not on file   Social Needs    Financial resource strain: Not on file    Food insecurity     Worry: Not on file     Inability: Not on file    Transportation needs     Medical: Not on file     Non-medical: Not on file   Tobacco Use    Smoking status: Never Smoker    Smokeless tobacco: Never Used   Substance and Sexual Activity    Alcohol use: Yes     Comment: a few times a week    Drug use: Not Currently    Sexual activity: Yes     Partners: Male   Lifestyle    Physical activity     Days per week: Not on file     Minutes per session: Not on file    Stress: Not on file   Relationships    Social connections     Talks on phone: Not on file     Gets together: Not on file     Attends Druze service: Not on file     Active member of club or organization: Not on file     Attends meetings of clubs or organizations: Not on file     Relationship status: Not on file    Intimate partner violence     Fear of current or ex partner: Not on file     Emotionally abused: Not on file     Physically abused: Not on file     Forced sexual activity: Not on file   Other Topics Concern    Not on file   Social History Narrative    Not on file     Family History   Problem Relation Age of Onset    Breast Cancer Mother     Diabetes Father     Breast Cancer Maternal Aunt     Breast Cancer Paternal Aunt     Cancer Maternal Grandmother      REVIEW OF SYSTEMS:   Constitutional: Negative for unexpected weight change. Eyes: Negative for visual disturbance. Respiratory: Negative for cough and shortness of breath. Cardiovascular: Positive for palpitations (Related to anxiety). Negative for chest pain. Gastrointestinal: Positive for abdominal pain (IBS). Musculoskeletal: Positive for arthralgias (Bilat knee pain w/ redness and warmth- Has seen doctor) and myalgias (Back pains- States poor posture). Neurological: Positive for headaches (Related to medication). Hematological: Negative for adenopathy. Does not bruise/bleed easily. Psychiatric/Behavioral: Negative for dysphoric mood. The patient is nervous/anxious (On Zoloft). I personally reviewed and agree with the above ROS as documented by my medical assistant.     PHYSICAL EXAMINATION:   Vitals: /89 (Site: Left Upper Arm, Position: Sitting, Cuff Size: Medium Adult)   Pulse 84   Temp 97.6 °F (36.4 °C) (Infrared)   Resp 16   Ht 5' (1.524 m)   Wt 180 lb (81.6 kg)   SpO2 98%   BMI 35.15 kg/m²   General: Well-developed, well-nourished, in no apparent distress. Eyes:  Conjunctivae appear normal. Pupils are equal and reactive. Extraocular movements are intact. The sclerae are not injected and show no jaundice. Nose, Mouth and Throat:  Patient is wearing a mask. Neck: Supple, without thyromegaly or adenopathy. Respiratory: Normal respiratory effort, clear to auscultation bilaterally. Cardiovascular: Regular rate and rhythm. No lower extremity edema. Gastrointestinal: Soft, nontender, nondistended, without obvious masses or hernias. Musculoskeletal: Normal gait and range of motion in all 4 extremities. Psychiatric: Alert and oriented x 3. Appropriate affect and behavior for today's visit. Skin: No concerning rashes, lesions, nodules or other skin changes. Lymphatic System:  No concerning cervical, supraclavicular or axillary lymphadenopathy. Breast Exam:  The breasts are normal in contour. Bra size 32-34 C. Right Breast: Examination of the right breast in the upright and supine positions reveal no obvious masses, skin changes, dimpling, or retraction. The nipple and areola are without erosion, edema or ulceration. There is no obvious nipple discharge. There is no concerning axillary adenopathy. Left Breast: Examination of the left breast in the upright and supine positions reveal no obvious masses, skin changes, dimpling, or retraction. The nipple and areola are without erosion, edema or ulceration. There is no obvious nipple discharge. There is no concerning axillary adenopathy. IMAGING: I personally reviewed the breast imaging performed from today and discussed this with the patient. There were no suspicious findings or discrete masses identified in either breast.  She was given a BI-RADS 1.     IMPRESSION/RECOMMENDATION:  Marcelina Lombard is a 30 y.o. woman who presents today for evaluation of left breast pain. Her pain is likely due to her subpectoral implants. I will refer her to plastic surgery to discuss revision options in case this may improve her pain. I discussed other etiologies of breast pain in general.  We reviewed that breast pain is very common in women, and rarely a sign of cancer if it is the only presenting symptom. Treatment can involve multiple modalities. Some observational studies have shown a low fat and low caffeine diet as well as exercises may be helpful. A supportive and well fitting bra is widely accepted to alleviate breast pain. There is also some consideration for primrose oil and vitamin E. Warm compresses, cold, and gentle massage may also reduce pain. Pain relief with oral anti-inflammatory medications can also be considered. Surgical management is not indicated. She was given our breast pain handout. We discussed her family history and her personal risk for future breast cancer and I did perform a formal risk assessment using the MARCEL Risk Assessment tool (Tyrer-Cuzick Model), version 8. Her 10 year risk of breast cancer is 0.7% (general population average 0.3%). Her lifetime risk for breast cancer is 29.6% (general population average 13.4%). Thus, she does meet high risk criteria (which is a lifetime risk of 20% or higher). We discussed our recommendations for increased surveillance, to include: annual screening mammography beginning 10 years prior to the youngest affected family member (but not before age 27), annual screening MRI beginning 10 years prior to youngest affected family member (but not before age 22), and clinical breast exams every 6-12 months. We discussed the role of MRI scanning, its high sensitivity but also high false positive rate, and its importance as an adjunct in following patients at increased risk.   In general, an MRI if it is performed, will be done on the alternate 6 months from the mammogram.  We also stressed the importance of breast awareness. Self breast evaluation was reviewed. She reports negative genetic testing. We will request these results. She will also ask her mother about her genetic testing so we know how to best interpret her negative results. I would like to see her back in 1 year for an annual breast exam.  We will discuss when to start breast cancer screening once she has gathered more details about her family history and ages of onset. Otherwise, I encouraged her to continue her self-examinations on a monthly basis and to alert her physician of any changes. She is comfortable with this plan of approach and will contact me in the future if any new questions or concerns arise. Approximately 60 minutes were spent on today's encounter including the following nonpatient facing activities: preparing to see the patient and reviewing records, individual interpretation of imaging results, ordering of unique tests, medications, or procedures and documentation within the EHR.     Tamar Nichols MD

## 2021-04-16 NOTE — TELEPHONE ENCOUNTER
Medication:   Requested Prescriptions     Pending Prescriptions Disp Refills    levothyroxine (SYNTHROID) 75 MCG tablet 30 tablet 0     Sig: Take 1 tablet by mouth Daily       Last Filled:      Patient Phone Number: [phone] (home)     Last appt: 12/3/2020   Next appt: 6/2/2021    Last Thyroid:   Lab Results   Component Value Date    TSH 0.29 12/03/2020

## 2021-04-16 NOTE — LETTER
[facility]  [facility] [address]  Phone: [phone]  Fax: [phone]    Grace Collins DO        April 16, 2021       Patient: Kp Silva   MR Number: [id number]   YOB: 1995   Date of Visit: 4/16/2021       Dear Dr. Mederos Salts: Thank you for the request for consultation for Kp Silva to me for the evaluation of breast pain. Below are the relevant portions of my assessment and plan of care. If you have questions, please do not hesitate to call me. I look forward to following Jessica along with you.     Sincerely,    Ciara Parmar MD    CC providers:  Luis Sosa DO  [address] [facility] [place]

## 2021-04-17 RX ORDER — LEVOTHYROXINE SODIUM 0.07 MG/1
75 TABLET ORAL DAILY
Qty: 30 TABLET | Refills: 1 | Status: SHIPPED | OUTPATIENT
Start: 2021-04-17 | End: 2021-06-02

## 2021-05-07 DIAGNOSIS — R19.7 INTERMITTENT DIARRHEA: ICD-10-CM

## 2021-05-10 ENCOUNTER — OFFICE VISIT (OUTPATIENT)
Dept: SURGERY | Age: 26
End: 2021-05-10
Payer: COMMERCIAL

## 2021-05-10 VITALS
TEMPERATURE: 98.1 F | HEIGHT: 60 IN | WEIGHT: 185.2 LBS | HEART RATE: 83 BPM | SYSTOLIC BLOOD PRESSURE: 127 MMHG | BODY MASS INDEX: 36.36 KG/M2 | DIASTOLIC BLOOD PRESSURE: 89 MMHG

## 2021-05-10 DIAGNOSIS — D24.9 TUBULAR ADENOMA OF BREAST: ICD-10-CM

## 2021-05-10 DIAGNOSIS — N64.4 MASTODYNIA OF LEFT BREAST: ICD-10-CM

## 2021-05-10 DIAGNOSIS — Z98.82 S/P AUGMENTATION MAMMAPLASTY: Primary | ICD-10-CM

## 2021-05-10 PROCEDURE — 99204 OFFICE O/P NEW MOD 45 MIN: CPT | Performed by: SURGERY

## 2021-05-10 NOTE — PROGRESS NOTES
lymphadenopathy: No     SN-N: 28 cm     N-IMF: 10 cm     Breast width: 15.1 cm     Moderate animation      Waterfall deformity     Baker class 4 contracture     Large areola with scarring circumferentially     Boxy contour      L  Ptosis ndgndrndanddndend:nd nd2nd Palpable masses: No     Nipple retraction: No     Palpable axillary lymphadenopathy: No     SN-N: 27 cm     N-IMF: 10 cm     Breast width: 14.6 cm     Moderate animation     Waterfall deformity     Baker class 3 contracture     Large areola with scarring circumferentially     Boxy contour    RADIOLOGY: Reviewed    IMP: 30 y.o. female with grade 4 capsular contracture after augmentation mammaplasty for tubular breasts  PLAN: Discussed options with the patient. Given pain, recommend explantation with complete capsulectomies with implant exchange +/- revision mastopexy vs staged periareolar mastopexy to decrease the size of the areola. She is to provide the previous surgical op notes for planning. Will return in 1 month to also decrease her weight and then will work toward scheduling. A discussion regarding surgical options including: explantation, capsulectomies, implant exchange, and revision mastopexy was performed with the patient. Clinical photos were obtained. Additionally,discussion regarding the risks including, but not limited to: bleeding (potentially requiring transfusion or reoperation), infection, seroma, reoperation, poor cosmetic outcome, scarring, revisional surgery, nipple loss/complication, nipple malposition, diminished sensation, inability to breastfeed, VTE (DVT/PE), implant associated ALCL, and death was performed. All questions were answered in a satisfactory manner. The patient was counseled at length about the risks of rock Covid-19 during their perioperative period and any recovery window from their procedure.   The patient was made aware that rock Covid-19  may worsen their prognosis for recovering from their procedure  and lend to a higher morbidity and/or mortality risk. All material risks, benefits, and reasonable alternatives including postponing the procedure were discussed. The patient does wish to proceed with the procedure at this time.     Laura Lieberman MD  [address] [place] [address] [facility]  05/10/21

## 2021-05-10 NOTE — Clinical Note
Complex scenario given her extensive history - will plan for exchange of her implants with capsulectomy +/- concomitant revision mastopexy. Will see her again in 1 month and then will see how she would like to proceed. Thanks!   Maddie Kelly

## 2021-05-12 DIAGNOSIS — Z30.011 ENCOUNTER FOR INITIAL PRESCRIPTION OF CONTRACEPTIVE PILLS: ICD-10-CM

## 2021-05-12 DIAGNOSIS — Z87.42 HISTORY OF PCOS: ICD-10-CM

## 2021-05-12 RX ORDER — DROSPIRENONE AND ETHINYL ESTRADIOL 0.03MG-3MG
KIT ORAL
Qty: 28 TABLET | Refills: 0 | Status: SHIPPED | OUTPATIENT
Start: 2021-05-12 | End: 2021-06-18

## 2021-05-13 LAB
ALLERGEN BARLEY IGE: <0.1 KU/L
ALLERGEN BEEF: <0.1 KU/L
ALLERGEN CABBAGE IGE: <0.1 KU/L
ALLERGEN CARROT IGE: <0.1 KU/L
ALLERGEN CHICKEN IGE: <0.1 KU/L
ALLERGEN CODFISH IGE: <0.1 KU/L
ALLERGEN CORN IGE: <0.1 KU/L
ALLERGEN COW MILK IGE: 0.12 KU/L
ALLERGEN CRAB IGE: <0.1 KU/L
ALLERGEN EGG WHITE IGE: 0.12 KU/L
ALLERGEN GRAPE IGE: <0.1 KU/L
ALLERGEN LETTUCE IGE: <0.1 KU/L
ALLERGEN NAVY BEAN: <0.1 KU/L
ALLERGEN OAT: <0.1 KU/L
ALLERGEN ORANGE IGE: <0.1 KU/L
ALLERGEN PEANUT (F13) IGE: <0.1 KU/L
ALLERGEN PEPPER C. ANNUUM IGE: <0.1 KU/L
ALLERGEN PORK: <0.1 KU/L
ALLERGEN RICE IGE: <0.1 KU/L
ALLERGEN RYE IGE: <0.1 KU/L
ALLERGEN SOYBEAN IGE: <0.1 KU/L
ALLERGEN TOMATO IGE: <0.1 KU/L
ALLERGEN TUNA IGE: <0.1 KU/L
ALLERGEN WHEAT IGE: <0.1 KU/L
IGE: 251 IU/ML
POTATO, IGE: <0.1 KU/L
SHRIMP: <0.1 KU/L

## 2021-05-26 ENCOUNTER — TELEPHONE (OUTPATIENT)
Dept: ENDOCRINOLOGY | Age: 26
End: 2021-05-26

## 2021-05-26 DIAGNOSIS — E06.3 HASHIMOTO'S THYROIDITIS: Primary | ICD-10-CM

## 2021-05-26 DIAGNOSIS — E55.9 VITAMIN D INSUFFICIENCY: ICD-10-CM

## 2021-05-26 NOTE — TELEPHONE ENCOUNTER
Dr. Loren Ang patient. I placed the labs in the system. Elvira Carpenter will notify patient. Simple: Patient demonstrates quick and easy understanding

## 2021-05-26 NOTE — TELEPHONE ENCOUNTER
PATIENT CALLED TO SAY SHE IS SUPPOSED TO GET LABS DONE PRIOR TO HER UPCOMING APPT ON 6/2/21. SHE WOULD LIKE FOR SOMEONE TO FOLLOW UP WITH HER REGARDING GETTING AN ORDER PUT IN THE SYSTEM.

## 2021-05-28 ENCOUNTER — HOSPITAL ENCOUNTER (OUTPATIENT)
Age: 26
Discharge: HOME | End: 2021-05-28
Payer: COMMERCIAL

## 2021-05-28 DIAGNOSIS — E55.9 VITAMIN D INSUFFICIENCY: ICD-10-CM

## 2021-05-28 DIAGNOSIS — E06.3 HASHIMOTO'S THYROIDITIS: ICD-10-CM

## 2021-05-28 LAB
A/G RATIO: 1.1 (ref 1.1–2.2)
ALBUMIN SERPL-MCNC: 3.9 G/DL (ref 3.4–5)
ALP BLD-CCNC: 80 U/L (ref 40–129)
ALT SERPL-CCNC: 15 U/L (ref 10–40)
ANION GAP SERPL CALCULATED.3IONS-SCNC: 12 MMOL/L (ref 3–16)
AST SERPL-CCNC: 25 U/L (ref 15–37)
BILIRUB SERPL-MCNC: 0.3 MG/DL (ref 0–1)
BUN BLDV-MCNC: 6 MG/DL (ref 7–20)
CALCIUM SERPL-MCNC: 8.9 MG/DL (ref 8.3–10.6)
CHLORIDE BLD-SCNC: 102 MMOL/L (ref 99–110)
CO2: 24 MMOL/L (ref 21–32)
CREAT SERPL-MCNC: <0.5 MG/DL (ref 0.6–1.1)
GFR AFRICAN AMERICAN: >60
GFR NON-AFRICAN AMERICAN: >60
GLOBULIN: 3.5 G/DL
GLUCOSE BLD-MCNC: 73 MG/DL (ref 70–99)
POTASSIUM SERPL-SCNC: 4.4 MMOL/L (ref 3.5–5.1)
SODIUM BLD-SCNC: 138 MMOL/L (ref 136–145)
T3 FREE: 3.1 PG/ML (ref 2.3–4.2)
T4 FREE: 1.3 NG/DL (ref 0.9–1.8)
TOTAL PROTEIN: 7.4 G/DL (ref 6.4–8.2)
TSH SERPL DL<=0.05 MIU/L-ACNC: 0.15 UIU/ML (ref 0.27–4.2)
VITAMIN D 25-HYDROXY: 31 NG/ML

## 2021-05-28 PROCEDURE — 84481 FREE ASSAY (FT-3): CPT

## 2021-05-28 PROCEDURE — 84439 ASSAY OF FREE THYROXINE: CPT

## 2021-05-28 PROCEDURE — 82306 VITAMIN D 25 HYDROXY: CPT

## 2021-05-28 PROCEDURE — 36415 COLL VENOUS BLD VENIPUNCTURE: CPT

## 2021-05-28 PROCEDURE — 84443 ASSAY THYROID STIM HORMONE: CPT

## 2021-05-28 PROCEDURE — 80053 COMPREHEN METABOLIC PANEL: CPT

## 2021-06-02 ENCOUNTER — OFFICE VISIT (OUTPATIENT)
Dept: ENDOCRINOLOGY | Age: 26
End: 2021-06-02
Payer: COMMERCIAL

## 2021-06-02 VITALS
OXYGEN SATURATION: 97 % | HEART RATE: 92 BPM | HEIGHT: 60 IN | WEIGHT: 185 LBS | BODY MASS INDEX: 36.32 KG/M2 | SYSTOLIC BLOOD PRESSURE: 113 MMHG | DIASTOLIC BLOOD PRESSURE: 87 MMHG

## 2021-06-02 DIAGNOSIS — E28.2 PCOS (POLYCYSTIC OVARIAN SYNDROME): ICD-10-CM

## 2021-06-02 DIAGNOSIS — E55.9 VITAMIN D INSUFFICIENCY: ICD-10-CM

## 2021-06-02 DIAGNOSIS — E06.3 HASHIMOTO'S THYROIDITIS: ICD-10-CM

## 2021-06-02 DIAGNOSIS — E03.9 ACQUIRED HYPOTHYROIDISM: Primary | ICD-10-CM

## 2021-06-02 DIAGNOSIS — E66.9 CLASS 2 OBESITY WITH BODY MASS INDEX (BMI) OF 36.0 TO 36.9 IN ADULT, UNSPECIFIED OBESITY TYPE, UNSPECIFIED WHETHER SERIOUS COMORBIDITY PRESENT: ICD-10-CM

## 2021-06-02 PROCEDURE — 99214 OFFICE O/P EST MOD 30 MIN: CPT | Performed by: INTERNAL MEDICINE

## 2021-06-02 RX ORDER — LEVOTHYROXINE SODIUM 0.07 MG/1
TABLET ORAL
Qty: 30 TABLET | Refills: 3 | Status: SHIPPED | OUTPATIENT
Start: 2021-06-02 | End: 2021-09-29

## 2021-06-02 NOTE — PROGRESS NOTES
SUBJECTIVE:  Shell Waldrop is a 30 y.o. female who is being evaluated for hypothyroidism. 1. Acquired hypothyroidism  This started in 2009. Patient was diagnosed with hypothyroidism. The problem has been unchanged. Previous thyroid studies include: TSH and free thyroxine. Patient started medication in 2013. Currently patient is on: levothyroxine. Misses  0 doses a month. Current complaints: difficulty loosing weight, hypersomnia, fatigue, anxiety    Past medical history of hypothyroidism, PCOS, obesity, vitamin D, hypersomnia    2. PCOS (polycystic ovarian syndrome)  Never used Metformin   On birth control  Could not tolerate Aldactone, did not take for a long time  Has hair on the face  Shaves face, BCP helps some  On BCP no period  Off BCP irregular periods  5 yo started periods    3. Vitamin D insufficiency  Has fatigue    4. Hashimoto's thyroiditis  History of obstructive symptoms: difficulty swallowing No, changes in voice/hoarseness No.  History of radiation to patient's neck: No  Resent iodine exposure: No  Family history includes hyperthyroidism, hypothyroidism, Hashmotos, graves  Family history of thyroid cancer: No    5. Class 2 obesity with body mass index (BMI) of 36.0 to 36.9 in adult, unspecified obesity type, unspecified whether serious comorbidity present  Eats healthier, active       EXAMINATION:   THYROID ULTRASOUND       10/13/2020       COMPARISON:   None.       HISTORY:   ORDERING SYSTEM PROVIDED HISTORY: Hashimoto's thyroiditis   TECHNOLOGIST PROVIDED HISTORY:   Reason for exam:->neck pressure.  H/o hashimoto's thyroiditis       Patient on thyroid meds for 8 or 9 years.       FINDINGS:   Right thyroid lobe:  4.0 x 1.6 x 1.5 cm       Left thyroid lobe:  3.9 x 1.1 x 1.1 cm       Isthmus:  2.9 mm.       Thyroid Gland:  Thyroid gland demonstrates homogeneous echotexture and normal   vascularity.       Nodules: No thyroid nodules are present.       Cervical lymphadenopathy: No abnormal lymph Expenses:    Food Insecurity:     Worried About Running Out of Food in the Last Year:     [address] in the Last Year:    Transportation Needs:     Lack of Transportation (Medical):  Lack of Transportation (Non-Medical):    Physical Activity:     Days of Exercise per Week:     Minutes of Exercise per Session:    Stress:     Feeling of Stress :    Social Connections:     Frequency of Communication with Friends and Family:     Frequency of Social Gatherings with Friends and Family:     Attends Caodaism Services:     Active Member of Clubs or Organizations:     Attends Club or Organization Meetings:     Marital Status:    Intimate Partner Violence:     Fear of Current or Ex-Partner:     Emotionally Abused:     Physically Abused:     Sexually Abused:      Current Outpatient Medications   Medication Sig Dispense Refill    levothyroxine (SYNTHROID) 75 MCG tablet 1 tablet 6 days a week, 1/2 tablet 1 day a week 30 tablet 3    SALLIE 3-0.03 MG TABS TAKE ONE TABLET BY MOUTH DAILY 28 tablet 0    sertraline (ZOLOFT) 25 MG tablet TAKE ONE TABLET BY MOUTH DAILY 30 tablet 11    dicyclomine (BENTYL) 10 MG capsule Take 1 capsule by mouth 3 times daily as needed (cramps, diarrhea) 30 capsule 5    fluticasone (FLONASE) 50 MCG/ACT nasal spray 1 spray by Each Nostril route 2 times daily 1 Bottle 2    SUNOSI 150 MG TABS       methylphenidate (RITALIN LA) 20 MG extended release capsule       methylphenidate (RITALIN) 10 MG tablet       albuterol sulfate HFA (VENTOLIN HFA) 108 (90 Base) MCG/ACT inhaler Inhale 2 puffs into the lungs 4 times daily as needed for Wheezing 1 Inhaler 5     No current facility-administered medications for this visit.      Allergies   Allergen Reactions    Latex Rash     Family Status   Relation Name Status    Mother  (Not Specified)    Father  (Not Specified)    MAunt  (Not Specified)    PAunt  (Not Specified)    MGM  (Not Specified)       Review of Systems:  Constitutional: has fatigue, no fever, has recent weight gain, no recent weight loss, no changes in appetite  Eyes: no eye pain, no change in vision, no eye redness, no eye irritation, no double vision  Ears, nose, throat: has nasal congestion, no sore throat, no earache, no decrease in hearing, no hoarseness, no dry mouth, has sinus problems, no difficulty swallowing, no neck lumps, no dental problems, no mouth sores, no ringing in ears  Pulmonary: no shortness of breath, no wheezing, no dyspnea on exertion, no cough  Cardiovascular: no chest pain, no lower extremity edema, no orthopnea, no intermittent leg claudication, no palpitations  Gastrointestinal: no abdominal pain, no nausea, no vomiting, no diarrhea, no constipation, no dysphagia, no heartburn, no bloating  Genitourinary: no dysuria, no urinary incontinence, no urinary hesitancy, no urinary frequency, no feelings of urinary urgency, no nocturia  Musculoskeletal: no joint swelling, no joint stiffness, has joint pain, no muscle cramps, no muscle pain, no bone pain  Integument/Breast: no hair loss, no skin rashes, no skin lesions, no itching, has dry skin  Neurological: no numbness, no tingling, no weakness, no confusion, nhas headaches, no dizziness, no fainting, no tremors, no decrease in memory, no balance problems  Psychiatric: has anxiety, has depression, no insomnia  Hematologic/Lymphatic: no tendency for easy bleeding, no swollen lymph nodes, no tendency for easy bruising  Immunology: has seasonal allergies, no frequent infections, no frequent illnesses  Endocrine: has temperature intolerance    /87   Pulse 92   Ht 5' (1.524 m)   Wt 185 lb (83.9 kg)   LMP 05/19/2021   SpO2 97%   BMI 36.13 kg/m²    Wt Readings from Last 3 Encounters:   06/02/21 185 lb (83.9 kg)   05/10/21 185 lb 3.2 oz (84 kg)   04/16/21 180 lb (81.6 kg)     Body mass index is 36.13 kg/m².     OBJECTIVE:  Constitutional: no acute distress, well appearing and well nourished  Psychiatric: 09/04/2020     Lab Results   Component Value Date    TRIG 117 09/04/2020     Lab Results   Component Value Date    HDL 67 09/04/2020     Lab Results   Component Value Date    LDLCALC 96 09/04/2020     No results found for: LABVLDL, VLDL  No results found for: CHOLHDLRATIO  No results found for: Arabella New  Lab Results   Component Value Date    VITD25 31.0 05/28/2021        ASSESSMENT/PLAN:    1. Acquired hypothyroidism    - levothyroxine (SYNTHROID) 75 MCG tablet; 1 tablet 6 days a week, 1/2 tablet 1 day a week  Dispense: 30 tablet; Refill: 3  - T3, Free; Future  - T4, Free; Future  - TSH without Reflex; Future  - Comprehensive Metabolic Panel; Future    2. PCOS (polycystic ovarian syndrome)    - Comprehensive Metabolic Panel; Future  - DHEA-Sulfate; Future  - Testosterone, free, total; Future  - Insulin, total; Future    3. Vitamin D insufficiency    - Vitamin D 25 Hydroxy; Future    4. Hashimoto's thyroiditis    - T3, Free; Future  - T4, Free; Future  - TSH without Reflex; Future    5.  Class 2 obesity with body mass index (BMI) of 36.0 to 36.9 in adult, unspecified obesity type, unspecified whether serious comorbidity present  Diet, exercise      Reviewed and/or ordered clinical lab results Yes  Reviewed and/or ordered radiology tests Yes   Reviewed and/or ordered other diagnostic tests No  Discussed test results with performing physician No  Independently reviewed image, tracing, or specimen No  Made a decision to obtain old records No  Reviewed and summarized old records Yes   Hypothyroidism  Hashimoto's thyroiditis  TSH 2.86  Levothyroxine 0.075 mg  PCOS  Obtained history from other than patient No    Cain Corley was counseled regarding symptoms of thyroid, PCOS diagnosis, course and complications of disease if inadequately treated, side effects of medications, diagnosis, treatment options, and prognosis, risks, benefits, complications, and alternatives of treatment, labs, imaging and other studies and

## 2021-06-18 ENCOUNTER — PATIENT MESSAGE (OUTPATIENT)
Dept: FAMILY MEDICINE CLINIC | Age: 26
End: 2021-06-18

## 2021-06-18 DIAGNOSIS — Z87.42 HISTORY OF PCOS: ICD-10-CM

## 2021-06-18 DIAGNOSIS — Z30.011 ENCOUNTER FOR INITIAL PRESCRIPTION OF CONTRACEPTIVE PILLS: ICD-10-CM

## 2021-06-18 RX ORDER — DROSPIRENONE AND ETHINYL ESTRADIOL 0.03MG-3MG
KIT ORAL
Qty: 28 TABLET | Refills: 1 | Status: SHIPPED | OUTPATIENT
Start: 2021-06-18 | End: 2021-08-12

## 2021-06-18 RX ORDER — DROSPIRENONE AND ETHINYL ESTRADIOL 0.03MG-3MG
KIT ORAL
Qty: 28 TABLET | Refills: 0 | OUTPATIENT
Start: 2021-06-18

## 2021-06-18 NOTE — TELEPHONE ENCOUNTER
Future Appointments   Date Time Provider Sanya Webster   9/22/2021  3:30 PM Carli Bernabe MD [facility] [address]   4/21/2022  8:45 AM Jovanny Lemus MD [facility]     LOV 3/30/21

## 2021-06-18 NOTE — TELEPHONE ENCOUNTER
----- Message from Lilian Donnelly sent at 6/18/2021  1:12 PM EDT -----  Subject: Refill Request    QUESTIONS  Name of Medication? SALLIE 3-0.03 MG TABS  Patient-reported dosage and instructions? once daily  How many days do you have left? 0  Preferred Pharmacy? [facility] phone number (if available)? [phone]  Additional Information for Provider? Pt was out of medication a week ago. She usually gets this script filled by Dr. Junior Hubbard, GYN. She is no longer   with [id number] and pt would like to know if Dr. Estelita Zeng would write this script   for her until she can find another GYN. She states that this is for PCOS. Please call/advise. Goes to [facility]. ---------------------------------------------------------------------------  --------------  Eben SHEETS  What is the best way for the office to contact you? OK to leave message on   voicemail  Preferred Call Back Phone Number?  [phone]

## 2021-06-18 NOTE — TELEPHONE ENCOUNTER
From: Jam Morocho  To: Flaco MutDO kiley  Sent: 6/18/2021 1:17 PM EDT  Subject: Prescription Question    Hello,    My gynecologist, Dr. Zoila Godfrey is no longer with [facility]. I called their office and the recommendation to get my Brigid birth control refilled was to reach out to my PCP. I do plan on scheduling an appointment with a new gyno but need my prescription refilled in the meantime. I take BCP for PCOS. Thank you.

## 2021-06-23 ENCOUNTER — NURSE TRIAGE (OUTPATIENT)
Dept: OTHER | Facility: CLINIC | Age: 26
End: 2021-06-23

## 2021-06-24 ENCOUNTER — OFFICE VISIT (OUTPATIENT)
Dept: FAMILY MEDICINE CLINIC | Age: 26
End: 2021-06-24
Payer: COMMERCIAL

## 2021-06-24 VITALS
OXYGEN SATURATION: 97 % | HEART RATE: 84 BPM | DIASTOLIC BLOOD PRESSURE: 84 MMHG | HEIGHT: 60 IN | SYSTOLIC BLOOD PRESSURE: 112 MMHG | WEIGHT: 185 LBS | BODY MASS INDEX: 36.32 KG/M2

## 2021-06-24 DIAGNOSIS — R42 VERTIGO: Primary | ICD-10-CM

## 2021-06-24 PROCEDURE — 99213 OFFICE O/P EST LOW 20 MIN: CPT | Performed by: NURSE PRACTITIONER

## 2021-06-24 RX ORDER — VITAMIN B COMPLEX
1000 TABLET ORAL DAILY
COMMUNITY
End: 2022-03-14

## 2021-06-24 RX ORDER — MECLIZINE HYDROCHLORIDE 25 MG/1
25 TABLET ORAL 3 TIMES DAILY PRN
Qty: 30 TABLET | Refills: 0 | Status: SHIPPED | OUTPATIENT
Start: 2021-06-24 | End: 2021-07-04

## 2021-06-24 SDOH — ECONOMIC STABILITY: FOOD INSECURITY: WITHIN THE PAST 12 MONTHS, YOU WORRIED THAT YOUR FOOD WOULD RUN OUT BEFORE YOU GOT MONEY TO BUY MORE.: NEVER TRUE

## 2021-06-24 SDOH — ECONOMIC STABILITY: FOOD INSECURITY: WITHIN THE PAST 12 MONTHS, THE FOOD YOU BOUGHT JUST DIDN'T LAST AND YOU DIDN'T HAVE MONEY TO GET MORE.: NEVER TRUE

## 2021-06-24 ASSESSMENT — ENCOUNTER SYMPTOMS
NAUSEA: 0
SHORTNESS OF BREATH: 0
VOMITING: 0
DIARRHEA: 0
COUGH: 0

## 2021-06-24 ASSESSMENT — SOCIAL DETERMINANTS OF HEALTH (SDOH): HOW HARD IS IT FOR YOU TO PAY FOR THE VERY BASICS LIKE FOOD, HOUSING, MEDICAL CARE, AND HEATING?: NOT HARD AT ALL

## 2021-06-24 NOTE — PATIENT INSTRUCTIONS
· Start meclizine 25 mg three times daily as needed for vertigo- can cause you to feel more tired  · Lower sodium diet  · Moderate caffeine intake  · Use your Flonase daily  · Vertigo head exercises  · If no improvement in one week follow up if office       Patient Education        Vertigo: Exercises  Introduction  Here are some examples of exercises for you to try. The exercises may be suggested for a condition or for rehabilitation. Start each exercise slowly. Ease off the exercises if you start to have pain. You will be told when to start these exercises and which ones will work best for you. How to do the exercises  Exercise 1   1. Stand with a chair in front of you and a wall behind you. If you begin to fall, you may use them for support. 2. Stand with your feet together and your arms at your sides. 3. Move your head up and down 10 times. Exercise 2   1. Move your head side to side 10 times. Exercise 3   1. Move your head diagonally up and down 10 times. Exercise 4   1. Move your head diagonally up and down 10 times on the other side. Follow-up care is a key part of your treatment and safety. Be sure to make and go to all appointments, and call your doctor if you are having problems. It's also a good idea to know your test results and keep a list of the medicines you take. Where can you learn more? Go to https://[email or web address] org and sign in to your [employer] account. Enter F349 in the [facility] box to learn more about \"Vertigo: Exercises. \"     If you do not have an account, please click on the \"Sign Up Now\" link. Current as of: December 2, 2020               Content Version: 12.9  © 3351-6973 Healthwise, Incorporated. Care instructions adapted under license by [facility] ([facility]).  If you have questions about a medical condition or this instruction, always ask your healthcare professional. Edwin Mijares any warranty or liability for your use of this

## 2021-06-24 NOTE — TELEPHONE ENCOUNTER
Reason for Disposition   [1] MODERATE dizziness (e.g., vertigo; feels very unsteady, interferes with normal activities) AND [2] has NOT been evaluated by physician for this    Answer Assessment - Initial Assessment Questions  1. DESCRIPTION: \"Describe your dizziness. \"        Corinne Wiggins stated she has been experiencing vertigo since Monday and has been getting dizzy with the room shaking. 2. VERTIGO: \"Do you feel like either you or the room is spinning or tilting?\"        Spinning and tilting    3. LIGHTHEADED: \"Do you feel lightheaded? \" (e.g., somewhat faint, woozy, weak upon standing)         Lightheaded    4. SEVERITY: \"How bad is it? \"  \"Can you walk? \"    - MILD - Feels unsteady but walking normally. - MODERATE - Feels very unsteady when walking, but not falling; interferes with normal activities (e.g., school, work) . - SEVERE - Unable to walk without falling (requires assistance). Moderate     5. ONSET:  \"When did the dizziness begin? \"       Monday    6. AGGRAVATING FACTORS: \"Does anything make it worse? \" (e.g., standing, change in head position)        Stretching and change in head position    7. CAUSE: \"What do you think is causing the dizziness? \"       Unsure    8. RECURRENT SYMPTOM: \"Have you had dizziness before? \" If so, ask: \"When was the last time? \" \"What happened that time? \"        Yes, every couple of months    9. OTHER SYMPTOMS: \"Do you have any other symptoms? \" (e.g., headache, weakness, numbness, vomiting, earache)        Nausea, headache    10. PREGNANCY: \"Is there any chance you are pregnant? \" \"When was your last menstrual period? \"       None    Caller stated she has been experiencing vertigo since Monday and has been getting dizzy with the room shaking, as if she is on a boat. Caller stated she has experienced this before every couple of months. Caller was informed to follow up with her PCP within 24 and to call back for additional questions and agreed.     Protocols used:

## 2021-06-24 NOTE — PROGRESS NOTES
2021     Chief Complaint   Patient presents with    Dizziness     since monday/ vertigo     Judge Monk (:  1995) is a 30 y.o. female, here for evaluation of the following medical concerns:    HPI    Patient is here with complaints of 4 days history of vertigo. Symptoms are triggered by head movement (to left left, down and up) and with rapid position change. The vertigo is described as room spinning. Symptoms last for a few seconds. No visual disturbance, hearing disturbance, tinnitus, ear pain, rash. Ears feeling full off and on, has some post nasal drip due to seasonal allergies. Takes Flonase PRN. She does have headaches but this are not unchanged from her typical. Has had similar episodes of vertigo in the past.       Review of Systems   Constitutional: Negative for chills, diaphoresis, fatigue and fever. HENT: Positive for ear pain (ear fullness off and on) and postnasal drip. Negative for tinnitus. Respiratory: Negative for cough and shortness of breath. Cardiovascular: Negative for chest pain and leg swelling. Gastrointestinal: Negative for diarrhea, nausea and vomiting. Neurological: Positive for dizziness (started Monday vertigo) and headaches (typical). All other systems reviewed and are negative. Prior to Visit Medications    Medication Sig Taking?  Authorizing Provider   Vitamin D (CHOLECALCIFEROL) 25 MCG (1000 UT) TABS tablet Take 1,000 Units by mouth daily Yes Historical Provider, MD   meclizine (ANTIVERT) 25 MG tablet Take 1 tablet by mouth 3 times daily as needed for Dizziness Yes Sherine Henry APRN - CNP   drospirenone-ethinyl estradiol (SALLIE) 3-0.03 MG TABS TAKE ONE TABLET BY MOUTH DAILY Yes Bing Melvin,    levothyroxine (SYNTHROID) 75 MCG tablet 1 tablet 6 days a week, 1/2 tablet 1 day a week Yes Vianey Cuellar MD   sertraline (ZOLOFT) 25 MG tablet TAKE ONE TABLET BY MOUTH DAILY Yes Bing Melvin, DO   fluticasone (FLONASE) 50 MCG/ACT nasal spray 1 spray by Each Nostril route 2 times daily Yes Uriel Guzmán MD   SUNOSI 150 MG TABS  Yes Historical Provider, MD   methylphenidate (RITALIN LA) 20 MG extended release capsule  Yes Historical Provider, MD   methylphenidate (RITALIN) 10 MG tablet  Yes Historical Provider, MD   albuterol sulfate HFA (VENTOLIN HFA) 108 (90 Base) MCG/ACT inhaler Inhale 2 puffs into the lungs 4 times daily as needed for Wheezing Yes Alyssa Grills, DO        Social History     Tobacco Use    Smoking status: Never Smoker    Smokeless tobacco: Never Used   Substance Use Topics    Alcohol use: Yes     Comment: a few times a week        Vitals:    06/24/21 0946   BP: 112/84   Site: Left Upper Arm   Position: Sitting   Cuff Size: Medium Adult   Pulse: 84   SpO2: 97%   Weight: 185 lb (83.9 kg)   Height: 5' (1.524 m)     Estimated body mass index is 36.13 kg/m² as calculated from the following:    Height as of this encounter: 5' (1.524 m). Weight as of this encounter: 185 lb (83.9 kg). Physical Exam  Vitals and nursing note reviewed. Constitutional:       General: She is not in acute distress. Appearance: Normal appearance. She is well-developed. She is not ill-appearing, toxic-appearing or diaphoretic. HENT:      Head: Normocephalic and atraumatic. Right Ear: Tympanic membrane, ear canal and external ear normal. There is no impacted cerumen. Left Ear: Tympanic membrane, ear canal and external ear normal. There is no impacted cerumen. Mouth/Throat:      Mouth: Mucous membranes are moist.      Pharynx: Oropharynx is clear. No oropharyngeal exudate. Eyes:      General: No scleral icterus. Right eye: No discharge. Left eye: No discharge. Extraocular Movements: Extraocular movements intact. Conjunctiva/sclera: Conjunctivae normal.      Pupils: Pupils are equal, round, and reactive to light. Cardiovascular:      Rate and Rhythm: Normal rate and regular rhythm.       Heart sounds: Normal heart sounds, S1 normal and S2 normal. No murmur heard. No friction rub. No gallop. Pulmonary:      Effort: Pulmonary effort is normal. No respiratory distress. Breath sounds: Normal breath sounds. No stridor. No wheezing, rhonchi or rales. Neurological:      General: No focal deficit present. Mental Status: She is alert and oriented to person, place, and time. Mental status is at baseline. Cranial Nerves: No cranial nerve deficit. Psychiatric:         Speech: Speech normal.         ASSESSMENT/PLAN:  1. Vertigo  - meclizine (ANTIVERT) 25 MG tablet; Take 1 tablet by mouth 3 times daily as needed for Dizziness  Dispense: 30 tablet; Refill: 0    Patient Instructions     · Start meclizine 25 mg three times daily as needed for vertigo- can cause you to feel more tired  · Lower sodium diet  · Moderate caffeine intake  · Use your Flonase daily  · Vertigo head exercises  · If no improvement in one to two weeks follow up if office        Vertigo: Exercises  Introduction  Here are some examples of exercises for you to try. The exercises may be suggested for a condition or for rehabilitation. Start each exercise slowly. Ease off the exercises if you start to have pain. You will be told when to start these exercises and which ones will work best for you. How to do the exercises  Exercise 1     An electronic signature was used to authenticate this note.     --CECY Evans CNP on 6/24/2021 at 10:44 AM

## 2021-08-05 ENCOUNTER — TELEPHONE (OUTPATIENT)
Dept: FAMILY MEDICINE CLINIC | Age: 26
End: 2021-08-05

## 2021-08-05 DIAGNOSIS — F41.9 ANXIETY AND DEPRESSION: Primary | ICD-10-CM

## 2021-08-05 DIAGNOSIS — F32.A ANXIETY AND DEPRESSION: Primary | ICD-10-CM

## 2021-08-05 NOTE — TELEPHONE ENCOUNTER
Patient would like referral to psychiatry for anxiety and depression. She asked for referral with Carolin Aguila PHD.       Future Appointments   Date Time Provider Sanya Webster   9/29/2021  3:50 PM MD Santi Welch   4/21/2022  8:45 AM Lida Ache, MD [facility]   Past appointment was 6/24/21 with Eliane Lemus CNP.

## 2021-08-16 ENCOUNTER — PATIENT MESSAGE (OUTPATIENT)
Dept: FAMILY MEDICINE CLINIC | Age: 26
End: 2021-08-16

## 2021-08-16 NOTE — TELEPHONE ENCOUNTER
From: Pina Larose  To: Nima eLwis DO  Sent: 8/16/2021 11:01 AM EDT  Subject: Non-Urgent Medical Question    Hello,    Can I get a referral for a psychologist?    Thanks,  Pina Larose

## 2021-08-24 LAB
CHOLESTEROL, TOTAL: 201 MG/DL (ref 0–199)
GLUCOSE BLD-MCNC: 86 MG/DL (ref 70–99)
HDLC SERPL-MCNC: 64 MG/DL (ref 40–60)
LDL CHOLESTEROL CALCULATED: 111 MG/DL
TRIGL SERPL-MCNC: 131 MG/DL (ref 0–150)

## 2021-09-29 ENCOUNTER — OFFICE VISIT (OUTPATIENT)
Dept: ENDOCRINOLOGY | Age: 26
End: 2021-09-29
Payer: COMMERCIAL

## 2021-09-29 VITALS
SYSTOLIC BLOOD PRESSURE: 124 MMHG | TEMPERATURE: 98 F | HEIGHT: 61 IN | HEART RATE: 91 BPM | OXYGEN SATURATION: 98 % | WEIGHT: 197.6 LBS | DIASTOLIC BLOOD PRESSURE: 91 MMHG | RESPIRATION RATE: 14 BRPM | BODY MASS INDEX: 37.31 KG/M2

## 2021-09-29 DIAGNOSIS — E06.3 HASHIMOTO'S THYROIDITIS: ICD-10-CM

## 2021-09-29 DIAGNOSIS — E28.2 PCOS (POLYCYSTIC OVARIAN SYNDROME): ICD-10-CM

## 2021-09-29 DIAGNOSIS — E55.9 VITAMIN D INSUFFICIENCY: ICD-10-CM

## 2021-09-29 DIAGNOSIS — E66.9 CLASS 2 OBESITY WITH BODY MASS INDEX (BMI) OF 37.0 TO 37.9 IN ADULT, UNSPECIFIED OBESITY TYPE, UNSPECIFIED WHETHER SERIOUS COMORBIDITY PRESENT: ICD-10-CM

## 2021-09-29 DIAGNOSIS — E03.9 ACQUIRED HYPOTHYROIDISM: Primary | ICD-10-CM

## 2021-09-29 PROCEDURE — 99214 OFFICE O/P EST MOD 30 MIN: CPT | Performed by: INTERNAL MEDICINE

## 2021-09-29 RX ORDER — LEVOTHYROXINE SODIUM 0.07 MG/1
TABLET ORAL
Qty: 90 TABLET | Refills: 1 | Status: SHIPPED | OUTPATIENT
Start: 2021-09-29 | End: 2022-01-26

## 2021-09-29 RX ORDER — DEXAMETHASONE 1 MG
TABLET ORAL
Qty: 1 TABLET | Refills: 0 | Status: SHIPPED | OUTPATIENT
Start: 2021-09-29 | End: 2021-12-06 | Stop reason: ALTCHOICE

## 2021-09-29 NOTE — PROGRESS NOTES
SUBJECTIVE:  Adam Bernal is a 27 y.o. female who is being evaluated for hypothyroidism. 1. Acquired hypothyroidism  This started in 2009. Patient was diagnosed with hypothyroidism. The problem has been unchanged. Previous thyroid studies include: TSH and free thyroxine. Patient started medication in 2013. Currently patient is on: levothyroxine. Misses  0 doses a month. Current complaints: difficulty loosing weight, hypersomnia, fatigue, anxiety    Past medical history of hypothyroidism, PCOS, obesity, vitamin D, hypersomnia    2. PCOS (polycystic ovarian syndrome)  Never used Metformin   On birth control  Could not tolerate Aldactone, did not take for a long time  Has hair on the face  Shaves face, BCP helps some  On BCP no period  Off BCP irregular periods  5 yo started periods    3. Vitamin D insufficiency  Has fatigue    4. Hashimoto's thyroiditis  History of obstructive symptoms: difficulty swallowing No, changes in voice/hoarseness No.  History of radiation to patient's neck: No  Resent iodine exposure: No  Family history includes hyperthyroidism, hypothyroidism, Hashmotos, graves  Family history of thyroid cancer: No    5. Obesity  Eats healthier, active       EXAMINATION:   THYROID ULTRASOUND       10/13/2020       COMPARISON:   None.       HISTORY:   ORDERING SYSTEM PROVIDED HISTORY: Hashimoto's thyroiditis   TECHNOLOGIST PROVIDED HISTORY:   Reason for exam:->neck pressure.  H/o hashimoto's thyroiditis       Patient on thyroid meds for 8 or 9 years.       FINDINGS:   Right thyroid lobe:  4.0 x 1.6 x 1.5 cm       Left thyroid lobe:  3.9 x 1.1 x 1.1 cm       Isthmus:  2.9 mm.       Thyroid Gland:  Thyroid gland demonstrates homogeneous echotexture and normal   vascularity.       Nodules: No thyroid nodules are present.       Cervical lymphadenopathy: No abnormal lymph nodes in the imaged portions of   the neck.           Impression   Normal sonographic appearance of the thyroid.  No thyroid nodule or finding   of active thyroiditis.             Past Medical History:   Diagnosis Date    Allergic rhinitis     Asthma     Breast cyst     Reports a cyst in right breast as a teenager- not sure of breast imaging     Dizziness     Hashimoto's thyroiditis     Headache     HPV (human papilloma virus) infection     Hypersomnia     PCOS (polycystic ovarian syndrome)      Patient Active Problem List    Diagnosis Date Noted    PCOS (polycystic ovarian syndrome)     Acquired hypothyroidism     Class 2 obesity with body mass index (BMI) of 36.0 to 36.9 in adult     Family history of breast cancer 04/16/2021    Hashimoto's thyroiditis 03/18/2020    Vitamin D insufficiency 03/18/2020    Pain in both knees 03/18/2020    Asthma in adult 03/18/2020    Persistent depressive disorder 03/18/2020     Past Surgical History:   Procedure Laterality Date    BREAST ENHANCEMENT SURGERY      COLPOSCOPY      TONSILLECTOMY AND ADENOIDECTOMY       Family History   Problem Relation Age of Onset    Breast Cancer Mother     Diabetes Father     Breast Cancer Maternal Aunt     Breast Cancer Paternal Aunt     Cancer Maternal Grandmother      Social History     Socioeconomic History    Marital status: Single     Spouse name: None    Number of children: None    Years of education: None    Highest education level: None   Occupational History    None   Tobacco Use    Smoking status: Never Smoker    Smokeless tobacco: Never Used   Vaping Use    Vaping Use: Never used   Substance and Sexual Activity    Alcohol use: Not Currently     Comment: a few times a week    Drug use: Not Currently    Sexual activity: Yes     Partners: Male   Other Topics Concern    None   Social History Narrative    None     Social Determinants of Health     Financial Resource Strain: Low Risk     Difficulty of Paying Living Expenses: Not hard at all   Food Insecurity: No Food Insecurity    Worried About [address] [employer] in the Last Year: Never true    Ran Out of Food in the Last Year: Never true   Transportation Needs:     Lack of Transportation (Medical):  Lack of Transportation (Non-Medical):    Physical Activity:     Days of Exercise per Week:     Minutes of Exercise per Session:    Stress:     Feeling of Stress :    Social Connections:     Frequency of Communication with Friends and Family:     Frequency of Social Gatherings with Friends and Family:     Attends Orthodoxy Services:     Active Member of Clubs or Organizations:     Attends Club or Organization Meetings:     Marital Status:    Intimate Partner Violence:     Fear of Current or Ex-Partner:     Emotionally Abused:     Physically Abused:     Sexually Abused:      Current Outpatient Medications   Medication Sig Dispense Refill    dexamethasone (DECADRON) 1 MG tablet Take one tablet between 11:00 PM and 12:00 midnight for test 1 tablet 0    levothyroxine (SYNTHROID) 75 MCG tablet 1 tablet 6 days a week, 1/2 tablet 1 day a week 90 tablet 1    metFORMIN (GLUCOPHAGE) 500 MG tablet Take 1 tablet by mouth 2 times daily (with meals) 60 tablet 3    drospirenone-ethinyl estradiol (SALLIE) 3-0.03 MG TABS TAKE ONE TABLET BY MOUTH DAILY 28 tablet 5    sertraline (ZOLOFT) 25 MG tablet TAKE ONE TABLET BY MOUTH DAILY 30 tablet 11    fluticasone (FLONASE) 50 MCG/ACT nasal spray 1 spray by Each Nostril route 2 times daily 1 Bottle 2    SUNOSI 150 MG TABS       methylphenidate (RITALIN LA) 20 MG extended release capsule       methylphenidate (RITALIN) 10 MG tablet       albuterol sulfate HFA (VENTOLIN HFA) 108 (90 Base) MCG/ACT inhaler Inhale 2 puffs into the lungs 4 times daily as needed for Wheezing 1 Inhaler 5    Vitamin D (CHOLECALCIFEROL) 25 MCG (1000 UT) TABS tablet Take 1,000 Units by mouth daily (Patient not taking: Reported on 9/29/2021)       No current facility-administered medications for this visit.      Allergies   Allergen Reactions    Latex Rash     Family Status   Relation Name Status    Mother  (Not Specified)    Father  (Not Specified)    MAunt  (Not Specified)    PAunt  (Not Specified)    MGM  (Not Specified)       Review of Systems:  Constitutional: has fatigue, no fever, has recent weight gain, no recent weight loss, no changes in appetite  Eyes: no eye pain, no change in vision, no eye redness, no eye irritation, no double vision  Ears, nose, throat: has nasal congestion, no sore throat, no earache, no decrease in hearing, no hoarseness, no dry mouth, has sinus problems, no difficulty swallowing, no neck lumps, no dental problems, no mouth sores, no ringing in ears  Pulmonary: no shortness of breath, no wheezing, no dyspnea on exertion, no cough  Cardiovascular: no chest pain, no lower extremity edema, no orthopnea, no intermittent leg claudication, no palpitations  Gastrointestinal: no abdominal pain, no nausea, no vomiting, no diarrhea, no constipation, no dysphagia, no heartburn, no bloating  Genitourinary: no dysuria, no urinary incontinence, no urinary hesitancy, no urinary frequency, no feelings of urinary urgency, no nocturia  Musculoskeletal: no joint swelling, no joint stiffness, has joint pain, no muscle cramps, no muscle pain, no bone pain  Integument/Breast: no hair loss, no skin rashes, no skin lesions, no itching, has dry skin  Neurological: no numbness, no tingling, no weakness, no confusion, nhas headaches, no dizziness, no fainting, no tremors, no decrease in memory, no balance problems  Psychiatric: has anxiety, has depression, no insomnia  Hematologic/Lymphatic: no tendency for easy bleeding, no swollen lymph nodes, no tendency for easy bruising  Immunology: has seasonal allergies, no frequent infections, no frequent illnesses  Endocrine: has temperature intolerance    BP (!) 124/91   Pulse 91   Temp 98 °F (36.7 °C)   Resp 14   Ht 5' 1\" (1.549 m)   Wt 197 lb 9.6 oz (89.6 kg)   SpO2 98%   BMI 37.34 kg/m²    Wt Readings from Last 3 Encounters:   09/29/21 197 lb 9.6 oz (89.6 kg)   06/24/21 185 lb (83.9 kg)   06/02/21 185 lb (83.9 kg)     Body mass index is 37.34 kg/m².     OBJECTIVE:  Constitutional: no acute distress, well appearing and well nourished  Psychiatric: oriented to person, place and time, judgement and insight and normal, recent and remote memory and intact and mood and affect are normal  Skin: skin and subcutaneous tissue is normal without mass, normal turgor  Head and Face: examination of head and face revealed no abnormalities  Eyes: no lid or conjunctival swelling, erythema or discharge, pupils are normal, equal, round, reactive to light  Ears/Nose: external inspection of ears and nose revealed no abnormalities, hearing is grossly normal  Oropharynx/Mouth/Face: lips, tongue and gums are normal with no lesions, the voice quality was normal  Neck: neck is supple and symmetric, with midline trachea and no masses, thyroid is normal  Lymphatics: normal cervical lymph nodes, normal supraclavicular nodes  Pulmonary: no increased work of breathing or signs of respiratory distress, lungs are clear to auscultation  Cardiovascular: normal heart rate and rhythm, normal S1 and S2, no murmurs and pedal pulses and 2+ bilaterally, No edema  Abdomen: abdomen is soft, non-tender with no masses  Musculoskeletal: normal gait and station and exam of the digits and nails are normal  Neurological: normal coordination and normal general cortical function      Lab Review:    No results found for: WBC, HGB, HCT, MCV, PLT  Lab Results   Component Value Date     09/28/2021    K 4.6 09/28/2021     09/28/2021    CO2 21 09/28/2021    BUN 5 09/28/2021    CREATININE 0.5 09/28/2021    GLUCOSE 80 09/28/2021    CALCIUM 9.0 09/28/2021    PROT 6.8 09/28/2021    LABALBU 4.0 09/28/2021    BILITOT <0.2 09/28/2021    ALKPHOS 89 09/28/2021    AST 18 09/28/2021    ALT 14 09/28/2021    LABGLOM >60 09/28/2021    GFRAA >60 09/28/2021    AGRATIO 1.4 09/28/2021 GLOB 2.8 09/28/2021     Lab Results   Component Value Date    TSHFT4 0.86 08/20/2020    TSH 0.87 09/28/2021    FT3 3.4 09/28/2021     No results found for: LABA1C  No results found for: EAG  Lab Results   Component Value Date    CHOL 201 08/24/2021     Lab Results   Component Value Date    TRIG 131 08/24/2021     Lab Results   Component Value Date    HDL 64 08/24/2021     Lab Results   Component Value Date    LDLCALC 111 08/24/2021     No results found for: LABVLDL, VLDL  No results found for: CHOLHDLRATIO  No results found for: Coleen Dryer  Lab Results   Component Value Date    VITD25 21.9 09/28/2021        ASSESSMENT/PLAN:    1. Acquired hypothyroidism  TSH 0.87  - levothyroxine (SYNTHROID) 75 MCG tablet; 1 tablet 6 days a week, 1/2 tablet 1 day a week  Dispense: 30 tablet; Refill: 3  - T3, Free; Future  - T4, Free; Future  - TSH without Reflex; Future  - Comprehensive Metabolic Panel; Future    2. PCOS (polycystic ovarian syndrome)    - Comprehensive Metabolic Panel; Future  - DHEA-Sulfate; Future  - Testosterone, free, total; Future  - Insulin, total; Future    3. Vitamin D insufficiency  Start vitamin D 1000 IU weekly  25 hydroxy vitamin D 21.9  - Vitamin D 25 Hydroxy; Future    4. Hashimoto's thyroiditis  - T3, Free; Future  - T4, Free; Future  - TSH without Reflex; Future    5.  Obesity  Diet, exercise  Gained a lot of weight     Reviewed and/or ordered clinical lab results Yes  Reviewed and/or ordered radiology tests Yes   Reviewed and/or ordered other diagnostic tests No  Discussed test results with performing physician No  Independently reviewed image, tracing, or specimen No  Made a decision to obtain old records No  Reviewed and summarized old records Yes   Hypothyroidism  Hashimoto's thyroiditis  TSH 2.86  Levothyroxine 0.075 mg  PCOS  Obtained history from other than patient No    Светлана Lopez was counseled regarding symptoms of thyroid, PCOS diagnosis, course and complications of disease if inadequately treated, side effects of medications, diagnosis, treatment options, and prognosis, risks, benefits, complications, and alternatives of treatment, labs, imaging and other studies and treatment targets and goals. She understands instructions and counseling. Return in about 3 months (around 12/29/2021) for thyroid problems.     Electronically signed by Ambrosio Guzman MD on 9/29/2021 at 4:33 PM

## 2021-10-04 ENCOUNTER — PATIENT MESSAGE (OUTPATIENT)
Dept: ENDOCRINOLOGY | Age: 26
End: 2021-10-04

## 2021-10-04 NOTE — TELEPHONE ENCOUNTER
From: Adam Bernal  To: Tien Lewis MD  Sent: 10/4/2021 1:09 PM EDT  Subject: Test Results Question    Hello-    I noticed some of my blood results were high or low and was curious if there was anything worth mentioning about those values. Thanks.

## 2021-10-05 NOTE — TELEPHONE ENCOUNTER
Spoke with patient and informed about results, answered questions. Patient will call to schedule a follow-up appointment next available.

## 2021-11-01 DIAGNOSIS — E55.9 VITAMIN D INSUFFICIENCY: ICD-10-CM

## 2021-11-01 DIAGNOSIS — E03.9 ACQUIRED HYPOTHYROIDISM: ICD-10-CM

## 2021-11-01 DIAGNOSIS — E28.2 PCOS (POLYCYSTIC OVARIAN SYNDROME): ICD-10-CM

## 2021-11-01 DIAGNOSIS — E06.3 HASHIMOTO'S THYROIDITIS: ICD-10-CM

## 2021-11-01 LAB
CORTISOL - AM: 7.9 UG/DL (ref 4.3–22.4)
ESTRADIOL LEVEL: 172 PG/ML
FOLLICLE STIMULATING HORMONE: 3.3 MIU/ML
LUTEINIZING HORMONE: 14.5 MIU/ML
PROLACTIN: 14.6 NG/ML

## 2021-11-02 DIAGNOSIS — E03.9 ACQUIRED HYPOTHYROIDISM: ICD-10-CM

## 2021-11-02 DIAGNOSIS — E06.3 HASHIMOTO'S THYROIDITIS: ICD-10-CM

## 2021-11-02 DIAGNOSIS — E55.9 VITAMIN D INSUFFICIENCY: ICD-10-CM

## 2021-11-02 DIAGNOSIS — E28.2 PCOS (POLYCYSTIC OVARIAN SYNDROME): ICD-10-CM

## 2021-11-02 LAB — CORTISOL - AM: <0.8 UG/DL (ref 4.3–22.4)

## 2021-11-03 ENCOUNTER — TELEPHONE (OUTPATIENT)
Dept: ENDOCRINOLOGY | Age: 26
End: 2021-11-03

## 2021-11-03 DIAGNOSIS — E28.2 PCOS (POLYCYSTIC OVARIAN SYNDROME): Primary | ICD-10-CM

## 2021-11-03 LAB — ADRENOCORTICOTROPIC HORMONE: 19 PG/ML (ref 6–58)

## 2021-11-03 NOTE — TELEPHONE ENCOUNTER
Please inform patient that the cortisol, menstrual cycle regulating hormones LH, FSH and estradiol and prolactin were normal.  Overnight dexamethasone suppression test is normal, appropriately suppressed cortisol. No Cushing's disease.   2 tests are still pending, ACTH and IGF-I.

## 2021-11-04 LAB
IGF-1 (INSULIN-LIKE GROWTH I): 195 NG/ML (ref 93–297)
INSULIN-LIKE GROWTH FACTOR-1 Z-SCORE: 0.2

## 2021-11-10 NOTE — TELEPHONE ENCOUNTER
Please inform patient that it is okay to resume birth control pills. Definitely recommend to continue Metformin for PCOS. It should be helpful with insulin resistance. If she tolerates 2 tablets of Metformin without severe side effects as diarrhea or upset stomach, please inform her that I recommend to increase to 3 tablets daily, as take 1 tablet in a.m. and 2 tablets in p.m. Let me know if I need to send a new prescription.     Also, pending tests IGF-I and ACTH came back normal.

## 2021-11-10 NOTE — TELEPHONE ENCOUNTER
Pt informed. Pt wants to know if they are safe to start birth control again. Pt also inquiring about Metformin continuation? Please advise.

## 2021-11-11 NOTE — TELEPHONE ENCOUNTER
Pt informed, acceptable to increase to 3 pills a day. Wants rx sent to [facility] [place] on [address]. SARAH, patient is dysarthic

## 2021-11-15 ENCOUNTER — TELEPHONE (OUTPATIENT)
Dept: ENDOCRINOLOGY | Age: 26
End: 2021-11-15

## 2021-11-15 DIAGNOSIS — E28.2 PCOS (POLYCYSTIC OVARIAN SYNDROME): ICD-10-CM

## 2021-11-16 NOTE — TELEPHONE ENCOUNTER
Corrected Metformin prescription. Take 1 tablet a.m. and 2 tablets in p.m. Sent prescription to pharmacy.

## 2021-12-06 ENCOUNTER — NURSE TRIAGE (OUTPATIENT)
Dept: OTHER | Facility: CLINIC | Age: 26
End: 2021-12-06

## 2021-12-06 RX ORDER — SERTRALINE HYDROCHLORIDE 150 MG/1
200 CAPSULE ORAL DAILY
COMMUNITY
Start: 2021-11-16

## 2021-12-06 NOTE — TELEPHONE ENCOUNTER
Received call from Sunshine at [facility] with Red Flag Complaint. Brief description of triage: numbness in both hands for over one month. When working, holding phone, driving, texting, her thumbs go to sleep and radiate to the other fingers     Triage indicates for patient to be seen within 3 days     Care advice provided, patient verbalizes understanding; denies any other questions or concerns; instructed to call back for any new or worsening symptoms. Writer sent call to [facility] at [facility] for appointment scheduling. Attention Provider: Thank you for allowing me to participate in the care of your patient. The patient was connected to triage in response to information provided to the [facility]. Please do not respond through this encounter as the response is not directed to a shared pool. Reason for Disposition   Numbness or tingling in one or both hands is a chronic symptom (recurrent or ongoing problem lasting > 4 weeks)    Answer Assessment - Initial Assessment Questions  1. SYMPTOM: \"What is the main symptom you are concerned about? \" (e.g., weakness, numbness)      Numbness in hands with activity like typing, texting, driving, holding phone     2. ONSET: \"When did this start? \" (minutes, hours, days; while sleeping)      One month ago     3. LAST NORMAL: \"When was the last time you were normal (no symptoms)? \"      One month ago     4. PATTERN \"Does this come and go, or has it been constant since it started? \"  \"Is it present now? \"      Comes and goes     5. CARDIAC SYMPTOMS: \"Have you had any of the following symptoms: chest pain, difficulty breathing, palpitations? \"      Denies     6. NEUROLOGIC SYMPTOMS: \"Have you had any of the following symptoms: headache, dizziness, vision loss, double vision, changes in speech, unsteady on your feet? \"      Denies     7. OTHER SYMPTOMS: \"Do you have any other symptoms? \"      Before her hands go numb feels a cool sensation first        8. PREGNANCY: \"Is there any chance you are pregnant? \" \"When was your last menstrual period? \"      Denies    Protocols used: NEUROLOGIC DEFICIT-ADULT-[place]

## 2021-12-07 ENCOUNTER — VIRTUAL VISIT (OUTPATIENT)
Dept: FAMILY MEDICINE CLINIC | Age: 26
End: 2021-12-07
Payer: COMMERCIAL

## 2021-12-07 DIAGNOSIS — R20.2 NUMBNESS AND TINGLING IN BOTH HANDS: Primary | ICD-10-CM

## 2021-12-07 DIAGNOSIS — R20.0 NUMBNESS AND TINGLING IN BOTH HANDS: Primary | ICD-10-CM

## 2021-12-07 PROCEDURE — 99214 OFFICE O/P EST MOD 30 MIN: CPT | Performed by: FAMILY MEDICINE

## 2021-12-07 NOTE — TELEPHONE ENCOUNTER
----- Message from Frederic Jeff, [facility] sent at 12/7/2021  3:37 PM EST -----  Subject: Message to Provider    QUESTIONS  Information for Provider? Lianet Morrison from [facility] called and stated   that the medication for the Voltaren was written by the  for 60 gram   and the pharmacy only comes in 100 grams tube. Pharmacy needs a verbal to   change that medication or a new Rx sent into pharmacy. [phone]  ---------------------------------------------------------------------------  --------------  Ky Fraction INFO  What is the best way for the office to contact you? OK to leave message on   voicemail  Preferred Call Back Phone Number? [phone]  ---------------------------------------------------------------------------  --------------  SCRIPT ANSWERS  Relationship to Patient? Third Party  Representative Name?  Justen/Aleksandra

## 2021-12-07 NOTE — PROGRESS NOTES
TELEHEALTH EVALUATION -- Audio/Visual (During GCZLI-17 public health emergency)    HPI:  Kandice Dodson (:  1995) is a 27 y.o. female,  here for evaluation of the following chief complaint(s):  Numbness (Bilateral numbness for 1 month/ pretty much constant )      ASSESSMENT/PLAN:   Diagnosis Orders   1. Numbness and tingling in both hands  Elastic Bandages & Supports (WRIST SPLINT/COCK-UP/LEFT M) MISC    EMG    diclofenac sodium (VOLTAREN) 1 % GEL     Jessica was seen today for numbness. Diagnoses and all orders for this visit:    Numbness and tingling in both hands, new  -     Elastic Bandages & Supports (WRIST SPLINT/COCK-UP/LEFT M) MISC; 1 each by Does not apply route nightly  -     Elastic Bandages & Supports (WRIST SPLINT/COCK-UP/RIGHT M) MISC; 1 each by Does not apply route nightly    -     EMG; Future  -     diclofenac sodium (VOLTAREN) 1 % GEL; Apply 2 g topically 4 times daily as needed for Pain            SUBJECTIVE/OBJECTIVE:  HPI   Hands going numb or tingling pain getting worse over months, now constant. Worse with sleeping in certain positions, feels at times can feel the blood flowing back into lower arms while sleeping and changing position. Works on keyboard. Hx of hypothyroid. Had had a hx of neck pain as well. Review of Systems   As above  Allergic/Immunologic: Negative for immunocompromised state. Psychiatric/Behavioral: Negative for agitation, behavioral problems and confusion. Physical Exam    Constitutional: [x] Appears well-developed and well-nourished [x] No apparent distress      [] Abnormal-   Mental status  [x] Alert and awake  [x] Oriented to person/place/time [x]Able to follow commands      Eyes:  EOM    [x]  Normal  [] Abnormal-  Sclera  [x]  Normal  [] Abnormal -         Discharge [x]  None visible  [] Abnormal -    HENT:   [x] Normocephalic, atraumatic.   [] Abnormal   [] Mouth/Throat: Mucous membranes are moist.     External Ears [x] Normal  [] Abnormal- Neck: [x] No visualized mass     Pulmonary/Chest: [x] Respiratory effort normal.  [x] No visualized signs of difficulty breathing or respiratory distress        [] Abnormal-    Able to speak in full sentences without difficulty  Musculoskeletal:   [] Normal gait with no signs of ataxia         [x] Normal range of motion of neck        [] Abnormal-       Neurological:        [x] No Facial Asymmetry (Cranial nerve 7 motor function) (limited exam to video visit)          [x] No gaze palsy        [] Abnormal-         Skin:        [x] No significant exanthematous lesions or discoloration noted on facial skin         [] Abnormal-            Psychiatric:       [x] Normal Affect [] No Hallucinations        [] Abnormal-   Judgment, behavior, thought and mood are normal.          Time spent today included for this patient visit includes time spent preparing to see the patient  Including review of tests, labs and imaging,   revewing previous history and recent encounters,   obtaining and/or reviewing separately obtained history in care everywhere or record,   performing a medically appropriate examination and/or evaluation;   counseling and educating the patient   ordering medications, tests, or procedures;   referring to other health care specialists if applicable;   documenting clinical information in the electronic health record;   independently interpreting results (not separately reported)   and communicating results to the patient. not billed for time, for mdm      (During WIDTU-89 public health emergency), evaluation of the following organ systems was limited: Vitals/Constitutional/EENT/Resp/CV/GI//MS/Neuro/Skin/Heme-Lymph-Imm.   Pursuant to the emergency declaration under the [facility], 1135 waiver authority and the [employer] and [employer] Act, this Virtual Visit was conducted with patient's (and/or legal guardian's) consent, to reduce the patient's risk of exposure to COVID-19 and provide necessary medical care. The patient (and/or legal guardian) has also been advised to contact this office for worsening conditions or problems, and seek emergency medical treatment and/or call 911 if deemed necessary. Patient initiated the encounter and gave consent for the encounter. Services were provided through a video synchronous discussion virtually to substitute for in-person clinic visit.  Patient and provider were located at their individual homes

## 2022-01-11 ENCOUNTER — PROCEDURE VISIT (OUTPATIENT)
Dept: NEUROLOGY | Age: 27
End: 2022-01-11
Payer: COMMERCIAL

## 2022-01-11 DIAGNOSIS — G56.03 BILATERAL CARPAL TUNNEL SYNDROME: Primary | ICD-10-CM

## 2022-01-11 PROCEDURE — 95886 MUSC TEST DONE W/N TEST COMP: CPT | Performed by: PSYCHIATRY & NEUROLOGY

## 2022-01-11 PROCEDURE — 95911 NRV CNDJ TEST 9-10 STUDIES: CPT | Performed by: PSYCHIATRY & NEUROLOGY

## 2022-01-11 NOTE — PATIENT INSTRUCTIONS
Verbal consent was obtained from patient and/or patient's advocate for in office procedure with Dr. Buddy Yoo (EMG or EEG).

## 2022-01-11 NOTE — PROGRESS NOTES
Tara Morel M.D. [facility]) Physicians/[facility]  Board Certified in Neurology & Electromyography  [address] [place] [address], [address] [place] [address]    EMG / NERVE CONDUCTION STUDY      PATIENT:  Maddy Reardon       DATE OF EM22     YOB: 1995       REASON FOR EMG:   Bilateral hand numbness      REFERRING PHYSICIAN:  [facility]  [facility]     SUMMARY:   Bilateral median sensory nerve studies with prolonged distal latencies. The left median motor nerve study was normal.  The right median motor nerve study had a slightly prolonged distal latency. Bilateral ulnar motor and sensory nerve studies were normal.    The left radial sensory nerve study was normal.  Needle EMG of several muscles in both upper extremities was normal.      CLINICAL DIAGNOSIS:  Carpal tunnel syndrome        EMG RESULTS:   This patient has bilateral median nerve lesions at the wrist.  (Carpal tunnel syndrome). The right side is slightly more involved when compared to the left side. ---------------------------------------------  Tara Morel M.D.   Electromyographer / Neurologist

## 2022-01-12 DIAGNOSIS — G56.03 BILATERAL CARPAL TUNNEL SYNDROME: Primary | ICD-10-CM

## 2022-01-25 DIAGNOSIS — E03.9 ACQUIRED HYPOTHYROIDISM: ICD-10-CM

## 2022-01-25 DIAGNOSIS — E55.9 VITAMIN D INSUFFICIENCY: ICD-10-CM

## 2022-01-25 DIAGNOSIS — E06.3 HASHIMOTO'S THYROIDITIS: ICD-10-CM

## 2022-01-25 DIAGNOSIS — E28.2 PCOS (POLYCYSTIC OVARIAN SYNDROME): ICD-10-CM

## 2022-01-25 LAB
T3 FREE: 3.3 PG/ML (ref 2.3–4.2)
T4 FREE: 1.1 NG/DL (ref 0.9–1.8)
TSH SERPL DL<=0.05 MIU/L-ACNC: 1.95 UIU/ML (ref 0.27–4.2)
VITAMIN D 25-HYDROXY: 20 NG/ML

## 2022-01-26 ENCOUNTER — OFFICE VISIT (OUTPATIENT)
Dept: ENDOCRINOLOGY | Age: 27
End: 2022-01-26
Payer: COMMERCIAL

## 2022-01-26 ENCOUNTER — OFFICE VISIT (OUTPATIENT)
Dept: ORTHOPEDIC SURGERY | Age: 27
End: 2022-01-26
Payer: COMMERCIAL

## 2022-01-26 VITALS
TEMPERATURE: 98 F | SYSTOLIC BLOOD PRESSURE: 115 MMHG | BODY MASS INDEX: 36.82 KG/M2 | DIASTOLIC BLOOD PRESSURE: 81 MMHG | OXYGEN SATURATION: 98 % | RESPIRATION RATE: 14 BRPM | HEIGHT: 61 IN | HEART RATE: 83 BPM | WEIGHT: 195 LBS

## 2022-01-26 VITALS — HEIGHT: 61 IN | WEIGHT: 197 LBS | RESPIRATION RATE: 16 BRPM | BODY MASS INDEX: 37.19 KG/M2

## 2022-01-26 DIAGNOSIS — E06.3 HASHIMOTO'S THYROIDITIS: ICD-10-CM

## 2022-01-26 DIAGNOSIS — E28.2 PCOS (POLYCYSTIC OVARIAN SYNDROME): ICD-10-CM

## 2022-01-26 DIAGNOSIS — E66.9 CLASS 2 OBESITY WITH BODY MASS INDEX (BMI) OF 36.0 TO 36.9 IN ADULT, UNSPECIFIED OBESITY TYPE, UNSPECIFIED WHETHER SERIOUS COMORBIDITY PRESENT: ICD-10-CM

## 2022-01-26 DIAGNOSIS — E03.9 ACQUIRED HYPOTHYROIDISM: Primary | ICD-10-CM

## 2022-01-26 DIAGNOSIS — G56.03 CARPAL TUNNEL SYNDROME, BILATERAL: Primary | ICD-10-CM

## 2022-01-26 DIAGNOSIS — E55.9 VITAMIN D INSUFFICIENCY: ICD-10-CM

## 2022-01-26 PROCEDURE — 99204 OFFICE O/P NEW MOD 45 MIN: CPT | Performed by: ORTHOPAEDIC SURGERY

## 2022-01-26 PROCEDURE — 99214 OFFICE O/P EST MOD 30 MIN: CPT | Performed by: INTERNAL MEDICINE

## 2022-01-26 RX ORDER — BUPROPION HYDROCHLORIDE 150 MG/1
150 TABLET ORAL DAILY
COMMUNITY
Start: 2022-01-18

## 2022-01-26 RX ORDER — LEVOTHYROXINE SODIUM 0.07 MG/1
75 TABLET ORAL DAILY
Qty: 90 TABLET | Refills: 1
Start: 2022-01-26 | End: 2022-05-11 | Stop reason: SDUPTHER

## 2022-01-26 NOTE — LETTER
CONSENT TO OPERATION  AND/OR OTHER PROCEDURE(S)          PATIENT : Pranav Keller   YOB: 1995      DATE : 1/26/22          1. I request and consent that Dr. Eleni Chow,  and/or his associates or assistants perform an operation and/or procedures on the above patient at  [place] [address], to treat the condition(s) which appear indicated by the diagnostic studies already performed. I have been told that in general terms the nature, purpose and reasonable expectations of the operation and/or procedure(s) are:     Bilateral Carpal Tunnel Release, Left First      2. It has been explained to me by the informing physician that during the course of the operation and/or procedure(s) unforeseen conditions may be revealed that necessitate an extension of the original operation and/or procedure(s) or different operation and/or procedures than those set forth in Paragraph 1. I therefore authorize and request that my physician and/or his associates or assistants perform such operations and/or procedures as are necessary and desirable in the exercise of professional judgment. The authority granted under this Paragraph 2 shall extend to all conditions that require treatment and are known to my physician at the time the operation is commenced. 3. I have been made aware by the informing physician of certain risks and consequences that are associated with the operation and/or procedure(s) described in Paragraph 1, the reasonable alternative methods or treatment, the possible consequences, the possibility that the operation and/or procedure(s) may be unsuccessful and the possibility of complications.   I understand the reasonably known risks to be:      - Bleeding  - Infection  - Poor Healing  - Possible Damage to Nerve, Vessel, Tendon/Muscle or Bone  - Need for further Treatment/Surgery  - Stiffness  - Pain  - Residual or Recurrent Symptoms  - Anesthetic and/or Medical Risks  - We have discussed the specific limitations and risks of hospital and/or office based treatment at this time due to the COVID-19 pandemic                I have been counseled about the risks of rock Covid-19 in the henrietta-operative and post-operative periods related to this procedure. I have been made aware that rock Covid-19 around the time of a surgical procedure may worsen my prognosis for recovering from the virus and lend to a higher morbidity and or mortality risk. With this knowledge, I have requested to proceed with the procedure as scheduled. 4. I have also been informed by the informing physician that there are other risks from both known and unknown causes that are attendant to the performance of any surgical procedure. I am aware that the practice of medicine and surgery is not an exact science, and that no guarantees have been made to me concerning the results of the operation and/or procedure(s). 5. I   CONSENT / REFUSE CONSENT  (strike the phrase that does not apply) to the taking of photographs before, during and/or after the operation or procedure for scientific/educational purposes. 6. I consent to the administration of anesthesia and to the use of such anesthetics as may be deemed advisable by the anesthesiologist who has been engaged by me or my physician.     7. I certify that I have read and understand the above consent to operation and/or other procedure(s); that the explanations therein referred to were made to me by the informing physician in advance of my signing this consent; that all blanks or statements requiring insertion or completion were filled in and inapplicable paragraphs, if any, were stricken before I signed; and that all questions asked by me about the operation and/or procedure(s) which I have consented to have been fully answered in a satisfactory manner.                                 _______________________           1/26/22 Witness     Signature Of Patient         Date        Dillerefrain Colon                                                 Informing Physician                                           Signature of Informing Physician                              If patient is unable to sign or is a minor, complete one of the following:    (A)  Patient is a minor   years of age. (B)  Patient is unable to sign because: The undersigned represents that he or she is duly authorized to execute this consent for and on behalf of the above named patient. Witness               o  Parent  o  Guardian   o  Spouse       o  Other (specify)                                           Patient Name: Antoinette Rothman  Patient YOB: 1995  Dr. Murali Booker' Return To Work Policy  Regarding your ability to return to work after surgery or injury, Dr. Mruali Booker will not state that any patient is off of work or cannot work at all. He will place you on restrictions after your surgical procedure or injury. Depending on the details of your particular situation, Dr. Murali Booker may state that you will have either light use or no use of your hand for a specific number of weeks. It is your obligation to communicate with your employer regarding your restrictions. It is your employer's decision as to whether they will accommodate your restrictions (i.e. allow you to come to work in your restricted capacity) or to not allow you to return to work under your restrictions. Dr. Murali Booker does not participate in making this decision and cannot influence your employer regarding their decision. If you do not communicate your restrictions to your employer, or if you do not present to work as you are scheduled to, Dr. Murali Booker will not provide an 'excuse' to explain your absence. A doctors note, or official forms (BWC, FMLA, etc.) will be filled out, upon request, to indicate your date of surgery and your restrictions as stated above.   Dr. Murali Booker' Narcotic Policy  Patients will only be prescribed narcotics after surgical procedures or significant injury. Not all procedures cause pain great enough to require Narcotics and thus, not all patients will receive prescriptions after surgical procedures or injuries. Narcotics are never prescribed for chronic conditions. Narcotics are never prescribed for use longer than one week at a time. Refills are only granted in unusual circumstances and only at Dr. Shlomo Mayen discretion. Patients who are receiving narcotic medication from another physician or who are under pain management contracts will not be given a prescription for narcotics for any reason. Surgery Arrival Time:  You have been advised of the START TIME for your surgery as well as the ARRIVAL TIME at which you need to arrive at the surgery facility. Please understand that there is a certain amount of preparation which takes place at the surgery facility prior to the start of your surgery. If you arrive later than your scheduled ARRIVAL TIME, it may be necessary to cancel your surgery for that day and reschedule your procedure due to lack of adequate time for pre-surgery preparations. Thank you for being on time for your arrival.    I have read the above policies and understand that by agreeing to proceed with treatment by Dr. Shandra Moses and his team, that I am agreeing to abide by these policies.   Patient Name:  Mariella Jansen    Patient Signature:  _____________________________    Susan Espinoza Date:   1/26/22

## 2022-01-26 NOTE — PROGRESS NOTES
This 27 y.o., right hand dominant [employer]  is seen in consultation for Duong Cabrera DO with a chief complaint of numbness and tingling of the bilateral hands. Symptoms have been present for 6 months. The patient also frequently notices pain in the hands, wrists and arms, as well as weakness of , morning stiffness and swelling as well as difficulty performing functions which require fine touch. Symptoms are frequently worse at night and can disturb sleep. The problem appears to recently have become worse. Conservative treatment has been prescribed(braces, NSAID gel). There is no history of wrist fracture. There is history and/or family history of diabetes. There is history of thyroid disease. There is family history of carpal tunnel syndrome(father). The pain assessment has been reviewed and is correct as stated. The patient's social history, past medical history, family history, medications, allergies and review of systems, entered 1/26/22, have been reviewed, and dated and are recorded in the chart. On examination the patient is Height: 5' 1\" (154.9 cm) tall and weighs Weight: 197 lb (89.4 kg). Respirations are 18 per minute. The patient is well nourished, is oriented to time and place, demonstrates appropriate mood and affect as well as normal gait and station. Cervical range of motion is normal for the patient's stated age and does not produce pain or paresthesias in either upper extremity. There is soft tissue swelling involving the volar aspect of the bilateral wrists. There is no bilateral thenar muscle atrophy. The Tinel sign is posative over the median nerve at the  carpal tunnel bilaterally and negative over the ulnar nerve at the elbow bilaterally. The Phalen test is positive bilaterally at 10 seconds. There is hypalgesia, with associated dysesthesia, on sensory testing over the median nerve distribution.   Two point discrimination is normal at the tip of the middle finger. Range of motion of the fingers, thumbs and wrists is normal.  Skin is intact without lesions. Distal circulation is normal.  All joints are stable. Fine motor coordination and gross muscle strength are normal. Deep tendon reflexes are brisk and present bilaterally. There are no subcutaneous masses or enlarged epitrochlear lymph nodes. I have personally reviewed and interpreted all previous external imaging studies, laboratory tests(T4,T3,TSH) diagnostic procedures and medical encounters pertinent to this patient's visit today. Review and independent interpretation of an external EMG study, dated 1/11/22 , preformed by Dr. Citlaly Quevedo, a physician outside of this office, is abnormal. There is moderate bilateral carpal tunnel syndrome. The test results are shared with the patient. Impression:     Carpal tunnel syndrome, bilateral, with clinical evidence of mild nerve damage. The nature of their medical problem is fully discussed with the patient, including all treatment options. Surgery is also discussed, including the possible risks, complications, prognosis and postoperative care. All questions are answered. The surgery consent form is explained and signed. Surgery will be scheduled and the patient is asked to call me if there are any additional questions. The patient understands that the surgery will be done by Dr. Eva Ramos.

## 2022-01-26 NOTE — PROGRESS NOTES
SUBJECTIVE:  Arlyn Moses is a 27 y.o. female who is being evaluated for hypothyroidism. 1. Acquired hypothyroidism  This started in 2009. Patient was diagnosed with hypothyroidism. The problem has been unchanged. Previous thyroid studies include: TSH and free thyroxine. Patient started medication in 2013. Currently patient is on: levothyroxine. Misses  0 doses a month. Current complaints: hypersomnia, fatigue, anxiety    Past medical history of hypothyroidism, PCOS, obesity, vitamin D, hypersomnia    2. PCOS (polycystic ovarian syndrome)  Never used Metformin   On birth control  Could not tolerate Aldactone, did not take for a long time  Has hair on the face  Shaves face, BCP helps some  On BCP no period  Off BCP irregular periods  3 yo started periods    3. Vitamin D insufficiency  Has fatigue    4. Hashimoto's thyroiditis  History of obstructive symptoms: difficulty swallowing No, changes in voice/hoarseness No.  History of radiation to patient's neck: No  Resent iodine exposure: No  Family history includes hyperthyroidism, hypothyroidism, Hashmotos, graves  Family history of thyroid cancer: No    5. Obesity  Eats healthier, active       EXAMINATION:   THYROID ULTRASOUND       10/13/2020       COMPARISON:   None.       HISTORY:   ORDERING SYSTEM PROVIDED HISTORY: Hashimoto's thyroiditis   TECHNOLOGIST PROVIDED HISTORY:   Reason for exam:->neck pressure.  H/o hashimoto's thyroiditis       Patient on thyroid meds for 8 or 9 years.       FINDINGS:   Right thyroid lobe:  4.0 x 1.6 x 1.5 cm       Left thyroid lobe:  3.9 x 1.1 x 1.1 cm       Isthmus:  2.9 mm.       Thyroid Gland:  Thyroid gland demonstrates homogeneous echotexture and normal   vascularity.       Nodules: No thyroid nodules are present.       Cervical lymphadenopathy: No abnormal lymph nodes in the imaged portions of   the neck.           Impression   Normal sonographic appearance of the thyroid.  No thyroid nodule or finding   of active thyroiditis.             Past Medical History:   Diagnosis Date    Allergic rhinitis     Asthma     Breast cyst     Reports a cyst in right breast as a teenager- not sure of breast imaging     Dizziness     Hashimoto's thyroiditis     Headache     HPV (human papilloma virus) infection     Hypersomnia     PCOS (polycystic ovarian syndrome)      Patient Active Problem List    Diagnosis Date Noted    Carpal tunnel syndrome, bilateral 01/26/2022    PCOS (polycystic ovarian syndrome)     Acquired hypothyroidism     Class 2 obesity with body mass index (BMI) of 37.0 to 37.9 in adult     Family history of breast cancer 04/16/2021    Hashimoto's thyroiditis 03/18/2020    Vitamin D insufficiency 03/18/2020    Pain in both knees 03/18/2020    Asthma in adult 03/18/2020    Persistent depressive disorder 03/18/2020     Past Surgical History:   Procedure Laterality Date    BREAST ENHANCEMENT SURGERY      COLPOSCOPY      TONSILLECTOMY AND ADENOIDECTOMY       Family History   Problem Relation Age of Onset    Breast Cancer Mother     Diabetes Father     Breast Cancer Maternal Aunt     Breast Cancer Paternal Aunt     Cancer Maternal Grandmother      Social History     Socioeconomic History    Marital status: Single     Spouse name: None    Number of children: None    Years of education: None    Highest education level: None   Occupational History    None   Tobacco Use    Smoking status: Never Smoker    Smokeless tobacco: Never Used   Vaping Use    Vaping Use: Never used   Substance and Sexual Activity    Alcohol use: Not Currently     Comment: a few times a week    Drug use: Not Currently    Sexual activity: Yes     Partners: Male   Other Topics Concern    None   Social History Narrative    None     Social Determinants of Health     Financial Resource Strain: Low Risk     Difficulty of Paying Living Expenses: Not hard at all   Food Insecurity: No Food Insecurity    Worried About Running Out of Food in the Last Year: Never true    [address] in the Last Year: Never true   Transportation Needs:     Lack of Transportation (Medical): Not on file    Lack of Transportation (Non-Medical):  Not on file   Physical Activity:     Days of Exercise per Week: Not on file    Minutes of Exercise per Session: Not on file   Stress:     Feeling of Stress : Not on file   Social Connections:     Frequency of Communication with Friends and Family: Not on file    Frequency of Social Gatherings with Friends and Family: Not on file    Attends Muslim Services: Not on file    Active Member of [address] [place] [address] or Organizations: Not on file    Attends Club or Organization Meetings: Not on file    Marital Status: Not on file   Intimate Partner Violence:     Fear of Current or Ex-Partner: Not on file    Emotionally Abused: Not on file    Physically Abused: Not on file    Sexually Abused: Not on file   Housing Stability:     Unable to Pay for Housing in the Last Year: Not on file    Number of [place] in the Last Year: Not on file    Unstable Housing in the Last Year: Not on file     Current Outpatient Medications   Medication Sig Dispense Refill    buPROPion (WELLBUTRIN XL) 150 MG extended release tablet Take 150 mg by mouth daily      Elastic Bandages & Supports (WRIST SPLINT/COCK-UP/LEFT M) MISC 1 each by Does not apply route nightly 1 each 0    diclofenac sodium (VOLTAREN) 1 % GEL Apply 2 g topically 4 times daily as needed for Pain 60 g 1    Elastic Bandages & Supports (WRIST SPLINT/COCK-UP/RIGHT M) MISC 1 each by Does not apply route nightly 1 each 0    sertraline (ZOLOFT) 100 MG tablet Take 150 mg by mouth daily       metFORMIN (GLUCOPHAGE) 500 MG tablet Take 1 tablet by mouth 2 times daily (with meals) 60 tablet 3    levothyroxine (SYNTHROID) 75 MCG tablet 1 tablet 6 days a week, 1/2 tablet 1 day a week 90 tablet 1    Vitamin D (CHOLECALCIFEROL) 25 MCG (1000 UT) TABS tablet Take 1,000 Units by mouth daily       fluticasone (FLONASE) 50 MCG/ACT nasal spray 1 spray by Each Nostril route 2 times daily 1 Bottle 2    SUNOSI 150 MG TABS       methylphenidate (RITALIN LA) 20 MG extended release capsule Take 20 mg by mouth 2 times daily.  methylphenidate (RITALIN) 10 MG tablet Take 10 mg by mouth as needed.  albuterol sulfate HFA (VENTOLIN HFA) 108 (90 Base) MCG/ACT inhaler Inhale 2 puffs into the lungs 4 times daily as needed for Wheezing 1 Inhaler 5     No current facility-administered medications for this visit.      Allergies   Allergen Reactions    Latex Rash     Family Status   Relation Name Status    Mother  (Not Specified)    Father  (Not Specified)    MAunt  (Not Specified)    PAunt  (Not Specified)    MGM  (Not Specified)       Review of Systems:  Constitutional: has fatigue, no fever, has recent weight gain, no recent weight loss, no changes in appetite  Eyes: no eye pain, no change in vision, no eye redness, no eye irritation, no double vision  Ears, nose, throat: has nasal congestion, no sore throat, no earache, no decrease in hearing, no hoarseness, no dry mouth, has sinus problems, no difficulty swallowing, no neck lumps, no dental problems, no mouth sores, no ringing in ears  Pulmonary: no shortness of breath, no wheezing, no dyspnea on exertion, no cough  Cardiovascular: no chest pain, no lower extremity edema, no orthopnea, no intermittent leg claudication, no palpitations  Gastrointestinal: no abdominal pain, no nausea, no vomiting, no diarrhea, no constipation, no dysphagia, no heartburn, no bloating  Genitourinary: no dysuria, no urinary incontinence, no urinary hesitancy, no urinary frequency, no feelings of urinary urgency, no nocturia  Musculoskeletal: no joint swelling, no joint stiffness, has joint pain, no muscle cramps, no muscle pain, no bone pain  Integument/Breast: no hair loss, no skin rashes, no skin lesions, no itching, has dry skin  Neurological: no numbness, no tingling, no weakness, no confusion, nhas headaches, no dizziness, no fainting, no tremors, no decrease in memory, no balance problems  Psychiatric: has anxiety, has depression, no insomnia  Hematologic/Lymphatic: no tendency for easy bleeding, no swollen lymph nodes, no tendency for easy bruising  Immunology: has seasonal allergies, no frequent infections, no frequent illnesses  Endocrine: has temperature intolerance    /81   Pulse 83   Temp 98 °F (36.7 °C)   Resp 14   Ht 5' 1\" (1.549 m)   Wt 195 lb (88.5 kg)   SpO2 98%   BMI 36.84 kg/m²    Wt Readings from Last 3 Encounters:   01/26/22 195 lb (88.5 kg)   01/26/22 197 lb (89.4 kg)   09/29/21 197 lb 9.6 oz (89.6 kg)     Body mass index is 36.84 kg/m².     OBJECTIVE:  Constitutional: no acute distress, well appearing and well nourished  Psychiatric: oriented to person, place and time, judgement and insight and normal, recent and remote memory and intact and mood and affect are normal  Skin: skin and subcutaneous tissue is normal without mass, normal turgor  Head and Face: examination of head and face revealed no abnormalities  Eyes: no lid or conjunctival swelling, erythema or discharge, pupils are normal, equal, round, reactive to light  Ears/Nose: external inspection of ears and nose revealed no abnormalities, hearing is grossly normal  Oropharynx/Mouth/Face: lips, tongue and gums are normal with no lesions, the voice quality was normal  Neck: neck is supple and symmetric, with midline trachea and no masses, thyroid is normal  Lymphatics: normal cervical lymph nodes, normal supraclavicular nodes  Pulmonary: no increased work of breathing or signs of respiratory distress, lungs are clear to auscultation  Cardiovascular: normal heart rate and rhythm, normal S1 and S2, no murmurs and pedal pulses and 2+ bilaterally, No edema  Abdomen: abdomen is soft, non-tender with no masses  Musculoskeletal: normal gait and station and exam of the digits and nails are normal  Neurological: normal coordination and normal general cortical function      Lab Review:    No results found for: WBC, HGB, HCT, MCV, PLT  Lab Results   Component Value Date     09/28/2021    K 4.6 09/28/2021     09/28/2021    CO2 21 09/28/2021    BUN 5 09/28/2021    CREATININE 0.5 09/28/2021    GLUCOSE 80 09/28/2021    CALCIUM 9.0 09/28/2021    PROT 6.8 09/28/2021    LABALBU 4.0 09/28/2021    BILITOT <0.2 09/28/2021    ALKPHOS 89 09/28/2021    AST 18 09/28/2021    ALT 14 09/28/2021    LABGLOM >60 09/28/2021    GFRAA >60 09/28/2021    AGRATIO 1.4 09/28/2021    GLOB 2.8 09/28/2021     Lab Results   Component Value Date    TSHFT4 0.86 08/20/2020    TSH 1.95 01/25/2022    FT3 3.3 01/25/2022     No results found for: LABA1C  No results found for: EAG  Lab Results   Component Value Date    CHOL 201 08/24/2021     Lab Results   Component Value Date    TRIG 131 08/24/2021     Lab Results   Component Value Date    HDL 64 08/24/2021     Lab Results   Component Value Date    LDLCALC 111 08/24/2021     No results found for: LABVLDL, VLDL  No results found for: CHOLHDLRATIO  No results found for: LABMICR, [id number]  Lab Results   Component Value Date    VITD25 20.0 01/25/2022        ASSESSMENT/PLAN:    1. Acquired hypothyroidism  Still very tired. TSH 0.87-1.95  Will have carpal tunnel sugery. Increase levothyroxine (SYNTHROID) 75 MCG tablet; 1 tablet daily  - T3, Free; Future  - T4, Free; Future  - TSH without Reflex; Future  - Comprehensive Metabolic Panel; Future    2. PCOS (polycystic ovarian syndrome)  Call for results of testosterone, DHEAS  1 mg overnight dexamethasone suppression test-cortisol less than 0.8, appropriate suppression  Prolactin 14.6  ACTH 19  - Comprehensive Metabolic Panel; Future  - DHEA-Sulfate; Future  - Testosterone, free, total; Future  - Insulin, total; Future    3. Vitamin D insufficiency  Not always taking it.   Start vitamin D 50,000 IU weekly  25 hydroxy vitamin D

## 2022-01-27 ENCOUNTER — E-VISIT (OUTPATIENT)
Dept: PRIMARY CARE CLINIC | Age: 27
End: 2022-01-27
Payer: COMMERCIAL

## 2022-01-27 DIAGNOSIS — R30.0 DYSURIA: Primary | ICD-10-CM

## 2022-01-27 LAB
INSULIN COMMENT: NORMAL
INSULIN REFERENCE RANGE:: NORMAL
INSULIN: 12.9 MU/L
SEX HORMONE BINDING GLOBULIN: 72 NMOL/L (ref 30–135)
TESTOSTERONE FREE-NONMALE: 5.4 PG/ML (ref 0.8–7.4)
TESTOSTERONE TOTAL: 51 NG/DL (ref 20–70)

## 2022-01-27 PROCEDURE — 99422 OL DIG E/M SVC 11-20 MIN: CPT | Performed by: NURSE PRACTITIONER

## 2022-01-28 ENCOUNTER — TELEPHONE (OUTPATIENT)
Dept: ORTHOPEDIC SURGERY | Age: 27
End: 2022-01-28

## 2022-01-28 LAB — DHEAS (DHEA SULFATE): 263 UG/DL (ref 65–380)

## 2022-01-28 RX ORDER — NITROFURANTOIN 25; 75 MG/1; MG/1
100 CAPSULE ORAL 2 TIMES DAILY
Qty: 14 CAPSULE | Refills: 0 | Status: SHIPPED | OUTPATIENT
Start: 2022-01-28 | End: 2022-02-04

## 2022-01-28 NOTE — PATIENT INSTRUCTIONS
Patient Education        Painful Urination (Dysuria): Care Instructions  Your Care Instructions  Burning pain with urination (dysuria) is a common symptom of a urinary tract infection or other urinary problems. The bladder may become inflamed. This can cause pain when the bladder fills and empties. You may also feel pain if the tube that carries urine from the bladder to the outside of the body (urethra) gets irritated or infected. Sexually transmitted infections (STIs) also may cause pain when you urinate. Sometimes the pain can be caused by things other than an infection. The urethra can be irritated by soaps, perfumes, or foreign objects in the urethra. Kidney stones can cause pain when they pass through the urethra. The cause may be hard to find. You may need tests. Treatment for painful urination depends on the cause. Follow-up care is a key part of your treatment and safety. Be sure to make and go to all appointments, and call your doctor if you are having problems. It's also a good idea to know your test results and keep a list of the medicines you take. How can you care for yourself at home? · Drink extra water for the next day or two. This will help make the urine less concentrated. (If you have kidney, heart, or liver disease and have to limit fluids, talk with your doctor before you increase the amount of fluids you drink.)  · Avoid drinks that are carbonated or have caffeine. They can irritate the bladder. · Urinate often. Try to empty your bladder each time. For women:  · Urinate right after you have sex. · After going to the bathroom, wipe from front to back. · Avoid douches, bubble baths, and feminine hygiene sprays. And avoid other feminine hygiene products that have deodorants. When should you call for help? Call your doctor now or seek immediate medical care if:    · You have new symptoms, such as fever, nausea, or vomiting.     · You have new or worse symptoms of a urinary problem. For example:  ? You have blood or pus in your urine. ? You have chills or body aches. ? It hurts worse to urinate. ? You have groin or belly pain. ? You have pain in your back just below your rib cage (the flank area). Watch closely for changes in your health, and be sure to contact your doctor if you have any problems. Where can you learn more? Go to [email or web address] and sign in to your [facility] account. Enter C859 in the [employer] box to learn more about \"Painful Urination (Dysuria): Care Instructions. \"     If you do not have an account, please click on the \"Sign Up Now\" link. Current as of: February 10, 2021               Content Version: 13.1  © 3772-5589 Healthwise, Incorporated. Care instructions adapted under license by [facility] ([facility]). If you have questions about a medical condition or this instruction, always ask your healthcare professional. [place] [address] any warranty or liability for your use of this information.

## 2022-01-31 DIAGNOSIS — G56.02 CARPAL TUNNEL SYNDROME, LEFT: Primary | ICD-10-CM

## 2022-01-31 DIAGNOSIS — G56.01 CARPAL TUNNEL SYNDROME ON RIGHT: Primary | ICD-10-CM

## 2022-02-12 ENCOUNTER — NURSE TRIAGE (OUTPATIENT)
Dept: OTHER | Facility: CLINIC | Age: 27
End: 2022-02-12

## 2022-02-13 NOTE — TELEPHONE ENCOUNTER
Subjective: Caller states \"I think I have an STI\"     Vaginal Itching    Cold sore on lip started on Tuesday or Wednesday     Current Symptoms: Vaginal itching Doesn't burn but vulva area it feels very hot. Kind of red \"in the inner part\"   Yellow discharge, kind of thick. \"urine is very yellow (almost looks like theres food coloring in it)\"   urine and vaginal area is odor    Discomfort woke her up out of sleep     Onset: 2/10/22    Associated Symptoms: NA    Pain Severity: No current pain    Temperature: \"I don't think I'm running a fever\"    What has been tried: nothing    LMP: 3 weeks Pregnant: No    Recommended disposition: See PCP within 24 hours     Care advice provided, patient verbalizes understanding; denies any other questions or concerns; instructed to call back for any new or worsening symptoms. Patient/caller to follow-up with PCP     Attention Provider: Thank you for allowing me to participate in the care of your patient. The patient was connected to triage in response to symptoms provided. Please do not respond through this encounter as the response is not directed to a shared pool.       Reason for Disposition   Vulvar symptoms (e.g., sores, redness, blisters, lumps, itching)   MODERATE-SEVERE itching (i.e., interferes with school, work, or sleep)    Protocols used: STI GUIDELINE SELECTION-ADULT-, VULVAR [facility]

## 2022-02-14 ENCOUNTER — OFFICE VISIT (OUTPATIENT)
Dept: FAMILY MEDICINE CLINIC | Age: 27
End: 2022-02-14
Payer: COMMERCIAL

## 2022-02-14 VITALS
DIASTOLIC BLOOD PRESSURE: 78 MMHG | RESPIRATION RATE: 18 BRPM | WEIGHT: 194 LBS | BODY MASS INDEX: 36.66 KG/M2 | OXYGEN SATURATION: 98 % | SYSTOLIC BLOOD PRESSURE: 112 MMHG | TEMPERATURE: 104 F

## 2022-02-14 DIAGNOSIS — N76.0 ACUTE VAGINITIS: Primary | ICD-10-CM

## 2022-02-14 DIAGNOSIS — K13.70 ORAL LESION: ICD-10-CM

## 2022-02-14 DIAGNOSIS — N89.8 VAGINAL DISCHARGE: ICD-10-CM

## 2022-02-14 LAB
BILIRUBIN, POC: NORMAL
BLOOD URINE, POC: NORMAL
CLARITY, POC: NORMAL
COLOR, POC: NORMAL
GLUCOSE URINE, POC: NORMAL
KETONES, POC: NORMAL
LEUKOCYTE EST, POC: NORMAL
NITRITE, POC: NORMAL
PH, POC: 7
PROTEIN, POC: NORMAL
SPECIFIC GRAVITY, POC: 1.02
UROBILINOGEN, POC: 0.2

## 2022-02-14 PROCEDURE — 99214 OFFICE O/P EST MOD 30 MIN: CPT | Performed by: NURSE PRACTITIONER

## 2022-02-14 PROCEDURE — 81003 URINALYSIS AUTO W/O SCOPE: CPT | Performed by: NURSE PRACTITIONER

## 2022-02-14 RX ORDER — MUPIROCIN CALCIUM 20 MG/G
CREAM TOPICAL
Qty: 15 G | Refills: 0 | Status: SHIPPED | OUTPATIENT
Start: 2022-02-14 | End: 2022-03-14

## 2022-02-14 ASSESSMENT — ENCOUNTER SYMPTOMS
RESPIRATORY NEGATIVE: 1
SORE THROAT: 0
GASTROINTESTINAL NEGATIVE: 1
EYE DISCHARGE: 0

## 2022-02-14 NOTE — PROGRESS NOTES
2/14/2022    This is a 27 y.o. female   Chief Complaint   Patient presents with    Urinary Tract Infection     smell, discharge, red and itching. started around Friday, Saturday    Mouth Lesions     started Wednesday, Thursday   . Lisa Alanis is seen today for evaluation of an oral lesion and vaginal discharge with vaginitis. She reports unprotected sex about 2 weeks ago. She state the vaginal symptoms and sore on her lip started about 1 week later. She has vaginitis, yellow vaginal discharge, dysuria, and notes an abnormal smell to her discharge. No fever or chills. No pelvic pain. The oral lesion first started as crusting and discomfort to the outer lip edge. She then was picking it and now it is crusted with a yellow/brown crust. She denies any vesicles. She has never has HSV outbreaks in the past. She is not concerned about pregnancy. Patient Active Problem List   Diagnosis    Hashimoto's thyroiditis    Vitamin D insufficiency    Pain in both knees    Asthma in adult    Persistent depressive disorder    Family history of breast cancer    PCOS (polycystic ovarian syndrome)    Acquired hypothyroidism    Class 2 obesity with body mass index (BMI) of 37.0 to 37.9 in adult    Carpal tunnel syndrome, bilateral       Current Outpatient Medications   Medication Sig Dispense Refill    mupirocin (BACTROBAN) 2 % cream Apply 3 times daily.  15 g 0    buPROPion (WELLBUTRIN XL) 150 MG extended release tablet Take 150 mg by mouth daily      levothyroxine (SYNTHROID) 75 MCG tablet Take 1 tablet by mouth Daily 90 tablet 1    Elastic Bandages & Supports (WRIST SPLINT/COCK-UP/LEFT M) MISC 1 each by Does not apply route nightly 1 each 0    diclofenac sodium (VOLTAREN) 1 % GEL Apply 2 g topically 4 times daily as needed for Pain 60 g 1    Elastic Bandages & Supports (WRIST SPLINT/COCK-UP/RIGHT M) MISC 1 each by Does not apply route nightly 1 each 0    sertraline (ZOLOFT) 100 MG tablet Take 150 mg by mouth daily       metFORMIN (GLUCOPHAGE) 500 MG tablet Take 1 tablet by mouth 2 times daily (with meals) 60 tablet 3    Vitamin D (CHOLECALCIFEROL) 25 MCG (1000 UT) TABS tablet Take 1,000 Units by mouth daily       fluticasone (FLONASE) 50 MCG/ACT nasal spray 1 spray by Each Nostril route 2 times daily 1 Bottle 2    SUNOSI 150 MG TABS       methylphenidate (RITALIN LA) 20 MG extended release capsule Take 20 mg by mouth 2 times daily.  methylphenidate (RITALIN) 10 MG tablet Take 10 mg by mouth as needed.  albuterol sulfate HFA (VENTOLIN HFA) 108 (90 Base) MCG/ACT inhaler Inhale 2 puffs into the lungs 4 times daily as needed for Wheezing 1 Inhaler 5     No current facility-administered medications for this visit. Allergies   Allergen Reactions    Latex Rash       /78 (Site: Left Upper Arm, Position: Sitting)   Temp 104 °F (40 °C)   Resp 18   Wt 194 lb (88 kg)   SpO2 98%   Breastfeeding No   BMI 36.66 kg/m²     Social History     Tobacco Use    Smoking status: Never Smoker    Smokeless tobacco: Never Used   Substance Use Topics    Alcohol use: Not Currently     Comment: a few times a week       Review of Systems   Constitutional: Negative for activity change and fatigue. HENT: Positive for mouth sores. Negative for sore throat. Eyes: Negative for discharge. Respiratory: Negative. Cardiovascular: Negative. Gastrointestinal: Negative. Genitourinary: Positive for dysuria, vaginal discharge and vaginal pain. Negative for genital sores, pelvic pain and vaginal bleeding. Neurological: Negative. Physical Exam  Nursing note reviewed. Constitutional:       Appearance: Normal appearance. HENT:      Head: Normocephalic and atraumatic. Right Ear: External ear normal.      Left Ear: External ear normal.      Nose: Nose normal. No rhinorrhea. Mouth/Throat:      Mouth: Mucous membranes are moist. Oral lesions present. Tongue: No lesions.       Pharynx: Oropharynx is clear. Eyes:      General:         Right eye: No discharge. Left eye: No discharge. Conjunctiva/sclera: Conjunctivae normal.   Neck:      Trachea: Phonation normal.   Pulmonary:      Effort: Pulmonary effort is normal. No respiratory distress. Genitourinary:     General: Normal vulva. Exam position: Lithotomy position. Labia:         Right: No rash. Left: No rash. Vagina: Erythema present. No tenderness or lesions. Cervix: Discharge (yellow white) present. No friability, erythema or cervical bleeding. Uterus: Normal. Not tender. Adnexa: Right adnexa normal and left adnexa normal.   Musculoskeletal:      Cervical back: Normal range of motion. Skin:     Coloration: Skin is not jaundiced or pale. Neurological:      General: No focal deficit present. Mental Status: She is alert and oriented to person, place, and time. Psychiatric:         Mood and Affect: Mood normal.         Behavior: Behavior normal.         Thought Content: Thought content normal.         Judgment: Judgment normal.         Diagnosis       ICD-10-CM    1. Acute vaginitis  N76.0 POCT Urinalysis No Micro (Auto)     VAGINAL PATHOGENS PROBE *A     C.trachomatis N.gonorrhoeae DNA     C.trachomatis N.gonorrhoeae DNA   2. Vaginal discharge  N89.8 VAGINAL PATHOGENS PROBE *A     C.trachomatis N.gonorrhoeae DNA     C.trachomatis N.gonorrhoeae DNA     HIV 1 DNA Qualitative, NAAT     HSV 1, 2 Glyco G-Specific IgG     HSV 1, 2 Glyco G-Specific IgG     HIV 1 DNA Qualitative, NAAT     CANCELED: Syphilis Antibody Cascading Reflex     CANCELED: Syphilis Antibody Cascading Reflex   3.  Oral lesion  K13.70         Plan    sti screens   Suspect bacterial vaginosis versus chlamydia/trich  Labs today  Pt wishes to wait on treatment based on results  UA today  Mupirocin to lip for impetigo    Orders Placed This Encounter   Procedures    C.trachomatis N.gonorrhoeae DNA     Standing Status: Future     Number of Occurrences:   1     Standing Expiration Date:   2/14/2023    VAGINAL PATHOGENS PROBE *A    HIV 1 DNA Qualitative, NAAT     Standing Status:   Future     Number of Occurrences:   1     Standing Expiration Date:   2/14/2023    HSV 1, 2 Glyco G-Specific IgG     Standing Status:   Future     Number of Occurrences:   1     Standing Expiration Date:   2/14/2023    RPR Reflex to Titer and TPPA    POCT Urinalysis No Micro (Auto)       Orders Placed This Encounter   Medications    mupirocin (BACTROBAN) 2 % cream     Sig: Apply 3 times daily. Dispense:  15 g     Refill:  0       Patient Education:  plan    Return if symptoms worsen or fail to improve.

## 2022-02-15 ENCOUNTER — TELEPHONE (OUTPATIENT)
Dept: FAMILY MEDICINE CLINIC | Age: 27
End: 2022-02-15

## 2022-02-15 ENCOUNTER — PATIENT MESSAGE (OUTPATIENT)
Dept: ENDOCRINOLOGY | Age: 27
End: 2022-02-15

## 2022-02-15 DIAGNOSIS — E55.9 VITAMIN D INSUFFICIENCY: Primary | ICD-10-CM

## 2022-02-15 LAB
C TRACH DNA GENITAL QL NAA+PROBE: NEGATIVE
CANDIDA SPECIES, DNA PROBE: ABNORMAL
GARDNERELLA VAGINALIS, DNA PROBE: ABNORMAL
N. GONORRHOEAE DNA: NEGATIVE
RPR: NORMAL
TRICHOMONAS VAGINALIS DNA: ABNORMAL
URINE CULTURE, ROUTINE: NORMAL

## 2022-02-15 RX ORDER — ERGOCALCIFEROL 1.25 MG/1
50000 CAPSULE ORAL WEEKLY
Qty: 12 CAPSULE | Refills: 1 | Status: SHIPPED | OUTPATIENT
Start: 2022-02-15 | End: 2022-05-11 | Stop reason: SDUPTHER

## 2022-02-15 NOTE — TELEPHONE ENCOUNTER
----- Message from [facility] sent at 2/15/2022  9:15 AM EST -----  Subject: Medication Problem    QUESTIONS  Name of Medication? mupirocin (BACTROBAN) 2 % cream  Patient-reported dosage and instructions? three times daily  What question or problem do you have with the medication? Third Party   Pharmacist Mahin wanted to know if provider can switch from the cream to   ointment than fill it for 22 grams instead of 15? Preferred Pharmacy? [facility], [address] [place] [address]  Pharmacy phone number (if available)? [phone]  Additional Information for Provider?   ---------------------------------------------------------------------------  --------------  CALL BACK INFO  What is the best way for the office to contact you? Do not leave any   message, patient will call back for answer  Preferred Call Back Phone Number? [phone]  ---------------------------------------------------------------------------  --------------  SCRIPT ANSWERS  Relationship to Patient? Third Party  Representative Name?  [employer]

## 2022-02-15 NOTE — TELEPHONE ENCOUNTER
From: Eldon Gonzales  To: Dr. Couch Herrera: 2/15/2022 7:49 AM EST  Subject: Vitamin D    Hello, I never received my vitamin d prescription. Can it be sent to [facility] delivery pharmacy?

## 2022-02-16 ENCOUNTER — TELEPHONE (OUTPATIENT)
Dept: FAMILY MEDICINE CLINIC | Age: 27
End: 2022-02-16

## 2022-02-16 LAB
HSV 1 GLYCOPROTEIN G AB IGG: 0.54 IV
HSV 2 GLYCOPROTEIN G AB IGG: 0.25 IV

## 2022-02-16 RX ORDER — FLUCONAZOLE 150 MG/1
TABLET ORAL
Qty: 2 TABLET | Refills: 0 | Status: SHIPPED | OUTPATIENT
Start: 2022-02-16 | End: 2022-03-14

## 2022-02-16 RX ORDER — METRONIDAZOLE 500 MG/1
500 TABLET ORAL 2 TIMES DAILY
Qty: 14 TABLET | Refills: 0 | Status: SHIPPED | OUTPATIENT
Start: 2022-02-16 | End: 2022-02-23

## 2022-02-16 NOTE — TELEPHONE ENCOUNTER
Please call the patient and let her know that the test came back postitive for both a yeast infection and bacterial vaginosis. She did not have chlamydia or ghonorrhea. The other STI screens were negative, but we are still waiting on the herpes test results. I have sent in an oral medication for the bacterial vaginosis. She should not drink alcohol while on the medication. She is also getting diflucan. She will take one tablet today and she will take the second tablet when she completes the antibiotic for the bacterial vaginosis.

## 2022-02-17 LAB — HIV-1 QUALITATIVE BY NAAT: NOT DETECTED

## 2022-02-22 ENCOUNTER — TELEPHONE (OUTPATIENT)
Dept: ORTHOPEDIC SURGERY | Age: 27
End: 2022-02-22

## 2022-03-11 ENCOUNTER — OFFICE VISIT (OUTPATIENT)
Dept: FAMILY MEDICINE CLINIC | Age: 27
End: 2022-03-11
Payer: COMMERCIAL

## 2022-03-11 VITALS
DIASTOLIC BLOOD PRESSURE: 82 MMHG | HEIGHT: 61 IN | WEIGHT: 191 LBS | OXYGEN SATURATION: 98 % | BODY MASS INDEX: 36.06 KG/M2 | HEART RATE: 84 BPM | TEMPERATURE: 97.7 F | SYSTOLIC BLOOD PRESSURE: 120 MMHG

## 2022-03-11 DIAGNOSIS — Z01.818 PRE-OP EXAM: Primary | ICD-10-CM

## 2022-03-11 DIAGNOSIS — R31.9 URINARY TRACT INFECTION WITH HEMATURIA, SITE UNSPECIFIED: ICD-10-CM

## 2022-03-11 DIAGNOSIS — N39.0 URINARY TRACT INFECTION WITH HEMATURIA, SITE UNSPECIFIED: ICD-10-CM

## 2022-03-11 DIAGNOSIS — R10.30 LOWER ABDOMINAL PAIN: ICD-10-CM

## 2022-03-11 DIAGNOSIS — R82.998 LEUKOCYTES IN URINE: ICD-10-CM

## 2022-03-11 DIAGNOSIS — G56.03 CARPAL TUNNEL SYNDROME, BILATERAL: ICD-10-CM

## 2022-03-11 LAB
BILIRUBIN, POC: NORMAL
BLOOD URINE, POC: NORMAL
CLARITY, POC: NORMAL
COLOR, POC: NORMAL
GLUCOSE URINE, POC: NORMAL
KETONES, POC: NORMAL
LEUKOCYTE EST, POC: NORMAL
NITRITE, POC: NORMAL
PH, POC: 7
PROTEIN, POC: NORMAL
SPECIFIC GRAVITY, POC: 1.01
UROBILINOGEN, POC: 0.2

## 2022-03-11 PROCEDURE — 81002 URINALYSIS NONAUTO W/O SCOPE: CPT | Performed by: FAMILY MEDICINE

## 2022-03-11 PROCEDURE — 99214 OFFICE O/P EST MOD 30 MIN: CPT | Performed by: FAMILY MEDICINE

## 2022-03-11 RX ORDER — ALBUTEROL SULFATE 90 UG/1
2 AEROSOL, METERED RESPIRATORY (INHALATION) 4 TIMES DAILY PRN
Qty: 1 EACH | Refills: 5 | Status: SHIPPED | OUTPATIENT
Start: 2022-03-11

## 2022-03-11 ASSESSMENT — PATIENT HEALTH QUESTIONNAIRE - PHQ9
2. FEELING DOWN, DEPRESSED OR HOPELESS: 0
1. LITTLE INTEREST OR PLEASURE IN DOING THINGS: 0
SUM OF ALL RESPONSES TO PHQ QUESTIONS 1-9: 0
SUM OF ALL RESPONSES TO PHQ9 QUESTIONS 1 & 2: 0

## 2022-03-11 NOTE — PROGRESS NOTES
Preoperative Consultation      Antoinette Rothman  YOB: 1995    Date of Service:  3/11/2022  Chief Complaint   Patient presents with    Pre-op Exam     LT carpal tunel on 03/22/2022 and the RT at [facility]     This patient presents to the office today for a preoperative consultation at the request of surgeon, Dr. Murali Booker   For bilateral carpal tunnel syndrome. First surgery, left,  3/22/22  Second surgery, right, 4/5/22  Conservative therapy:did not resolve condition. 27 y.o. patient /with planned surgery as above. .   Diagnosis Orders   1. Pre-op exam  POCT Urinalysis no Micro   2. Carpal tunnel syndrome, bilateral     3. Lower abdominal pain  POCT Urinalysis no Micro    Culture, Urine   4. Urinary tract infection with hematuria, site unspecified  Culture, Urine   5. Leukocytes in urine       Jessica was seen today for pre-op exam.    Diagnoses and all orders for this visit:    Pre-op exam  -     POCT Urinalysis no Micro    Carpal tunnel syndrome, bilateral  Pre op completed,   Cleared for surgery  Copy of this pre-op consultationwas sent to the surgeon via [employer], when in the [address] [place] [address] Rd system, as well as faxed to surgical pre op dept., and given to the patient to take with them on the day of surgery. Filled out form,see form. Lower abdominal pain, minimal, history of IBS. We will do a UA today:  -     POCT Urinalysis no Micro: leukocytes in urine, will culture    Refilled:  -     albuterol sulfate HFA (VENTOLIN HFA) 108 (90 Base) MCG/ACT inhaler;  Inhale 2 puffs into the lungs 4 times daily as needed for Wheezing          Vitals:    03/11/22 0716   BP: 120/82   Site: Left Upper Arm   Position: Sitting   Cuff Size: Medium Adult   Pulse: 84   Temp: 97.7 °F (36.5 °C)   SpO2: 98%   Weight: 191 lb (86.6 kg)   Height: 5' 1\" (1.549 m)      Wt Readings from Last 2 Encounters:   03/11/22 191 lb (86.6 kg)   02/14/22 194 lb (88 kg)     BP Readings from Last 3 Encounters:   03/11/22 120/82 02/14/22 112/78   01/26/22 115/81          Allergies   Allergen Reactions    Latex Rash     Outpatient Medications Marked as Taking for the 3/11/22 encounter (Office Visit) with Omar Horn,    Medication Sig Dispense Refill    albuterol sulfate HFA (VENTOLIN HFA) 108 (90 Base) MCG/ACT inhaler Inhale 2 puffs into the lungs 4 times daily as needed for Wheezing 1 each 5    fluconazole (DIFLUCAN) 150 MG tablet Take one tablet today and one tablet in 7 days 2 tablet 0    vitamin D (ERGOCALCIFEROL) 1.25 MG (59909 UT) CAPS capsule Take 1 capsule by mouth once a week 12 capsule 1    mupirocin (BACTROBAN) 2 % cream Apply 3 times daily. 15 g 0    buPROPion (WELLBUTRIN XL) 150 MG extended release tablet Take 150 mg by mouth daily      levothyroxine (SYNTHROID) 75 MCG tablet Take 1 tablet by mouth Daily 90 tablet 1    Elastic Bandages & Supports (WRIST SPLINT/COCK-UP/LEFT M) MISC 1 each by Does not apply route nightly 1 each 0    diclofenac sodium (VOLTAREN) 1 % GEL Apply 2 g topically 4 times daily as needed for Pain 60 g 1    Elastic Bandages & Supports (WRIST SPLINT/COCK-UP/RIGHT M) MISC 1 each by Does not apply route nightly 1 each 0    sertraline (ZOLOFT) 100 MG tablet Take 150 mg by mouth daily       metFORMIN (GLUCOPHAGE) 500 MG tablet Take 1 tablet by mouth 2 times daily (with meals) 60 tablet 3    Vitamin D (CHOLECALCIFEROL) 25 MCG (1000 UT) TABS tablet Take 1,000 Units by mouth daily       fluticasone (FLONASE) 50 MCG/ACT nasal spray 1 spray by Each Nostril route 2 times daily 1 Bottle 2    SUNOSI 150 MG TABS       methylphenidate (RITALIN LA) 20 MG extended release capsule Take 20 mg by mouth 2 times daily.  methylphenidate (RITALIN) 10 MG tablet Take 10 mg by mouth as needed.                 Patient Active Problem List   Diagnosis    Hashimoto's thyroiditis    Vitamin D insufficiency    Pain in both knees    Asthma in adult    Persistent depressive disorder    Family history of breast cancer    PCOS (polycystic ovarian syndrome)    Acquired hypothyroidism    Class 2 obesity with body mass index (BMI) of 37.0 to 37.9 in adult    Carpal tunnel syndrome, bilateral       Past Medical History:   Diagnosis Date    Allergic rhinitis     Asthma     Breast cyst     Reports a cyst in right breast as a teenager- not sure of breast imaging     Dizziness     Hashimoto's thyroiditis     Headache     HPV (human papilloma virus) infection     Hypersomnia     PCOS (polycystic ovarian syndrome)      Past Surgical History:   Procedure Laterality Date    BREAST ENHANCEMENT SURGERY      COLPOSCOPY      TONSILLECTOMY AND ADENOIDECTOMY       Family History   Problem Relation Age of Onset    Breast Cancer Mother     Diabetes Father     Breast Cancer Maternal Aunt     Breast Cancer Paternal Aunt     Cancer Maternal Grandmother      Social History     Socioeconomic History    Marital status: Single     Spouse name: Not on file    Number of children: Not on file    Years of education: Not on file    Highest education level: Not on file   Occupational History    Not on file   Tobacco Use    Smoking status: Never Smoker    Smokeless tobacco: Never Used   Vaping Use    Vaping Use: Never used   Substance and Sexual Activity    Alcohol use: Not Currently     Comment: a few times a week    Drug use: Not Currently    Sexual activity: Yes     Partners: Male   Other Topics Concern    Not on file   Social History Narrative    Not on file     Social Determinants of Health     Financial Resource Strain: Low Risk     Difficulty of Paying Living Expenses: Not hard at all   Food Insecurity: No Food Insecurity    Worried About Running Out of Food in the Last Year: Never true    Tomer of Food in the Last Year: Never true   Transportation Needs:     Lack of Transportation (Medical): Not on file    Lack of Transportation (Non-Medical):  Not on file   Physical Activity:     Days of Exercise per Week: Not on file    Minutes of Exercise per Session: Not on file   Stress:     Feeling of Stress : Not on file   Social Connections:     Frequency of Communication with Friends and Family: Not on file    Frequency of Social Gatherings with Friends and Family: Not on file    Attends [employer] Services: Not on file    Active Member of [address] [place] [address] or Organizations: Not on file    Attends Club or Organization Meetings: Not on file    Marital Status: Not on file   Intimate Partner Violence:     Fear of Current or Ex-Partner: Not on file    Emotionally Abused: Not on file    Physically Abused: Not on file    Sexually Abused: Not on file   Housing Stability:     Unable to Pay for Housing in the Last Year: Not on file    Number of [place] in the Last Year: Not on file    Unstable Housing in the Last Year: Not on file       Review of Systems  No fever or chills  No unexpected wt loss  No headaches  No chest pain  No shortness of breath  No nausea, vomiting, +diarrhea  No loss of coordination. Well developed well nourished patient   in no acute distress. Alert and oriented to person, place, and time. HEENT:Normocephalic, atraumatic. No scleral icterus. Pupils normal  Heart: Regular Rate and Rhythm. Respirations: lungs clear to auscultation bilaterally. Abdomin: Bowel sounds present. mild tenderness right lower quadrant. no r/r/g  Extremities: No peripheral edema. No erythema.   No rashes on wrist or hand,   EMG reviewed

## 2022-03-14 ENCOUNTER — TELEPHONE (OUTPATIENT)
Dept: FAMILY MEDICINE CLINIC | Age: 27
End: 2022-03-14

## 2022-03-14 LAB
ORGANISM: ABNORMAL
URINE CULTURE, ROUTINE: ABNORMAL

## 2022-03-14 NOTE — PROGRESS NOTES
Rodolfo Aguadilla    Age 27 y.o.    female    1995    MRN [id number]    3/22/2022  Arrival Time_____________  OR Time____________[address]     Procedure(s):  LEFT CARPAL TUNNEL RELEASE                      Monitor Anesthesia Care    Surgeon(s):  Troy Aguilar, MD       Phone [phone] (home)     [address]  Cell         Work  _____________________________________________________________________  _____________________________________________________________________  _____________________________________________________________________  _____________________________________________________________________  _____________________________________________________________________    PCP _____________________________ Phone_________________     H&P__________________Bringing      Chart            Epic   DOS      Called________  EKG__________________Bringing      Chart            Epic   DOS      Called________  LAB__________________ Bringing      Chart            Epic   DOS      Called________  Cardiac Clearance_______Bringing      Chart            Epic      DOS      Called________    Cardiologist________________________ Phone___________________________    ? Worship concerns / Waiver on Chart            PAT Communications________________  ? Pre-op Instructions Given [place]          _________________________________  ? Directions to Surgery Center                          _________________________________  ? Transportation Home_______________      __________________________________  ?  Crutches/Walker__________________        __________________________________    ________Pre-op Orders   _______Transcribed    _______Wt.  ________Pharmacy          _______SCD  ______VTE     ______TED Trang Cook  _______  Surgery Consent    _______  Anesthesia Consent         COVID DATE______________LOCATION________________ RESULT__________

## 2022-03-14 NOTE — TELEPHONE ENCOUNTER
Patient called concerned because she seen her urine results on mychart and wants to know what they mean .

## 2022-03-14 NOTE — PROGRESS NOTES
Obstructive Sleep Apnea (SARIA) Screening     Patient:  Dangelo Dumont    YOB: 1995      Medical Record #:  [id number]                     Date:  3/14/2022     1. Are you a loud and/or regular snorer? []  Yes       [x] No    2. Have you been observed to gasp or stop breathing during sleep? []  Yes       [x] No    3. Do you feel tired or groggy upon awakening or do you awaken with a headache?           []  Yes       [] No    4. Are you often tired or fatigued during the wake time hours? []  Yes       [] No    5. Do you fall asleep sitting, reading, watching TV or driving? []  Yes       [] No    6. Do you often have problems with memory or concentration? []  Yes       [] No    **If patient's score is ? 3 they are considered high risk for SAIRA. An Anesthesia provider will evaluate the patient and develop a plan of care the day of surgery. Note:  If the patient's BMI is more than 35 kg m¯² , has neck circumference > 40 cm, and/or high blood pressure the risk is greater (© American Sleep Apnea Association, 2006).

## 2022-03-15 ENCOUNTER — TELEPHONE (OUTPATIENT)
Dept: ORTHOPEDIC SURGERY | Age: 27
End: 2022-03-15

## 2022-03-15 DIAGNOSIS — N39.0 URINARY TRACT INFECTION WITHOUT HEMATURIA, SITE UNSPECIFIED: Primary | ICD-10-CM

## 2022-03-15 RX ORDER — CIPROFLOXACIN 250 MG/1
250 TABLET, FILM COATED ORAL 2 TIMES DAILY
Qty: 6 TABLET | Refills: 0 | Status: SHIPPED | OUTPATIENT
Start: 2022-03-15 | End: 2022-03-18

## 2022-03-15 NOTE — TELEPHONE ENCOUNTER
CPT: [id number]  BODY PART: right wrist  STATUS: outpatient  AUTHORIZATION: NPR    Per [employer] website, NPR

## 2022-03-16 ENCOUNTER — ANESTHESIA EVENT (OUTPATIENT)
Dept: OPERATING ROOM | Age: 27
End: 2022-03-16
Payer: COMMERCIAL

## 2022-03-16 ENCOUNTER — HOSPITAL ENCOUNTER (OUTPATIENT)
Age: 27
Discharge: HOME | End: 2022-03-16
Payer: COMMERCIAL

## 2022-03-16 DIAGNOSIS — G56.01 CARPAL TUNNEL SYNDROME ON RIGHT: ICD-10-CM

## 2022-03-16 PROCEDURE — U0003 INFECTIOUS AGENT DETECTION BY NUCLEIC ACID (DNA OR RNA); SEVERE ACUTE RESPIRATORY SYNDROME CORONAVIRUS 2 (SARS-COV-2) (CORONAVIRUS DISEASE [COVID-19]), AMPLIFIED PROBE TECHNIQUE, MAKING USE OF HIGH THROUGHPUT TECHNOLOGIES AS DESCRIBED BY CMS-2020-01-R: HCPCS

## 2022-03-16 PROCEDURE — U0005 INFEC AGEN DETEC AMPLI PROBE: HCPCS

## 2022-03-17 LAB — SARS-COV-2: NOT DETECTED

## 2022-03-21 ENCOUNTER — TELEPHONE (OUTPATIENT)
Dept: ORTHOPEDIC SURGERY | Age: 27
End: 2022-03-21

## 2022-03-22 ENCOUNTER — HOSPITAL ENCOUNTER (OUTPATIENT)
Age: 27
Setting detail: OUTPATIENT SURGERY
Discharge: HOME | End: 2022-03-22
Attending: ORTHOPAEDIC SURGERY | Admitting: ORTHOPAEDIC SURGERY
Payer: COMMERCIAL

## 2022-03-22 ENCOUNTER — ANESTHESIA (OUTPATIENT)
Dept: OPERATING ROOM | Age: 27
End: 2022-03-22
Payer: COMMERCIAL

## 2022-03-22 VITALS
WEIGHT: 190 LBS | DIASTOLIC BLOOD PRESSURE: 67 MMHG | OXYGEN SATURATION: 99 % | SYSTOLIC BLOOD PRESSURE: 107 MMHG | RESPIRATION RATE: 17 BRPM | BODY MASS INDEX: 35.87 KG/M2 | HEART RATE: 76 BPM | HEIGHT: 61 IN | TEMPERATURE: 97.4 F

## 2022-03-22 VITALS — DIASTOLIC BLOOD PRESSURE: 76 MMHG | OXYGEN SATURATION: 90 % | SYSTOLIC BLOOD PRESSURE: 106 MMHG

## 2022-03-22 LAB
GLUCOSE BLD-MCNC: 84 MG/DL (ref 70–99)
GLUCOSE BLD-MCNC: 91 MG/DL (ref 70–99)
PERFORMED ON: NORMAL
PERFORMED ON: NORMAL
PREGNANCY, URINE: NEGATIVE

## 2022-03-22 PROCEDURE — A4216 STERILE WATER/SALINE, 10 ML: HCPCS | Performed by: ANESTHESIOLOGY

## 2022-03-22 PROCEDURE — 3700000001 HC ADD 15 MINUTES (ANESTHESIA): Performed by: ORTHOPAEDIC SURGERY

## 2022-03-22 PROCEDURE — 7100000000 HC PACU RECOVERY - FIRST 15 MIN: Performed by: ORTHOPAEDIC SURGERY

## 2022-03-22 PROCEDURE — 7100000010 HC PHASE II RECOVERY - FIRST 15 MIN: Performed by: ORTHOPAEDIC SURGERY

## 2022-03-22 PROCEDURE — 2580000003 HC RX 258: Performed by: ORTHOPAEDIC SURGERY

## 2022-03-22 PROCEDURE — 2580000003 HC RX 258: Performed by: ANESTHESIOLOGY

## 2022-03-22 PROCEDURE — 7100000001 HC PACU RECOVERY - ADDTL 15 MIN: Performed by: ORTHOPAEDIC SURGERY

## 2022-03-22 PROCEDURE — 64721 CARPAL TUNNEL SURGERY: CPT | Performed by: ORTHOPAEDIC SURGERY

## 2022-03-22 PROCEDURE — 3700000000 HC ANESTHESIA ATTENDED CARE: Performed by: ORTHOPAEDIC SURGERY

## 2022-03-22 PROCEDURE — 84703 CHORIONIC GONADOTROPIN ASSAY: CPT

## 2022-03-22 PROCEDURE — 2500000003 HC RX 250 WO HCPCS: Performed by: ANESTHESIOLOGY

## 2022-03-22 PROCEDURE — 3600000003 HC SURGERY LEVEL 3 BASE: Performed by: ORTHOPAEDIC SURGERY

## 2022-03-22 PROCEDURE — 3600000013 HC SURGERY LEVEL 3 ADDTL 15MIN: Performed by: ORTHOPAEDIC SURGERY

## 2022-03-22 PROCEDURE — 2709999900 HC NON-CHARGEABLE SUPPLY: Performed by: ORTHOPAEDIC SURGERY

## 2022-03-22 PROCEDURE — 6360000002 HC RX W HCPCS: Performed by: NURSE ANESTHETIST, CERTIFIED REGISTERED

## 2022-03-22 PROCEDURE — A4217 STERILE WATER/SALINE, 500 ML: HCPCS | Performed by: ORTHOPAEDIC SURGERY

## 2022-03-22 PROCEDURE — 2500000003 HC RX 250 WO HCPCS: Performed by: ORTHOPAEDIC SURGERY

## 2022-03-22 RX ORDER — SODIUM CHLORIDE 0.9 % (FLUSH) 0.9 %
10 SYRINGE (ML) INJECTION EVERY 12 HOURS SCHEDULED
Status: DISCONTINUED | OUTPATIENT
Start: 2022-03-22 | End: 2022-03-22 | Stop reason: HOSPADM

## 2022-03-22 RX ORDER — ONDANSETRON 2 MG/ML
4 INJECTION INTRAMUSCULAR; INTRAVENOUS
Status: DISCONTINUED | OUTPATIENT
Start: 2022-03-22 | End: 2022-03-22 | Stop reason: HOSPADM

## 2022-03-22 RX ORDER — SODIUM CHLORIDE 9 MG/ML
25 INJECTION, SOLUTION INTRAVENOUS PRN
Status: DISCONTINUED | OUTPATIENT
Start: 2022-03-22 | End: 2022-03-22 | Stop reason: HOSPADM

## 2022-03-22 RX ORDER — OXYCODONE HYDROCHLORIDE 5 MG/1
10 TABLET ORAL PRN
Status: DISCONTINUED | OUTPATIENT
Start: 2022-03-22 | End: 2022-03-22 | Stop reason: HOSPADM

## 2022-03-22 RX ORDER — SODIUM CHLORIDE 0.9 % (FLUSH) 0.9 %
5-40 SYRINGE (ML) INJECTION EVERY 12 HOURS SCHEDULED
Status: DISCONTINUED | OUTPATIENT
Start: 2022-03-22 | End: 2022-03-22 | Stop reason: HOSPADM

## 2022-03-22 RX ORDER — SODIUM CHLORIDE 0.9 % (FLUSH) 0.9 %
5-40 SYRINGE (ML) INJECTION PRN
Status: DISCONTINUED | OUTPATIENT
Start: 2022-03-22 | End: 2022-03-22 | Stop reason: HOSPADM

## 2022-03-22 RX ORDER — OXYCODONE HYDROCHLORIDE 5 MG/1
5 TABLET ORAL PRN
Status: DISCONTINUED | OUTPATIENT
Start: 2022-03-22 | End: 2022-03-22 | Stop reason: HOSPADM

## 2022-03-22 RX ORDER — DIPHENHYDRAMINE HYDROCHLORIDE 50 MG/ML
12.5 INJECTION INTRAMUSCULAR; INTRAVENOUS
Status: DISCONTINUED | OUTPATIENT
Start: 2022-03-22 | End: 2022-03-22 | Stop reason: HOSPADM

## 2022-03-22 RX ORDER — SODIUM CHLORIDE 0.9 % (FLUSH) 0.9 %
10 SYRINGE (ML) INJECTION PRN
Status: DISCONTINUED | OUTPATIENT
Start: 2022-03-22 | End: 2022-03-22 | Stop reason: HOSPADM

## 2022-03-22 RX ORDER — LIDOCAINE HYDROCHLORIDE 10 MG/ML
1 INJECTION, SOLUTION EPIDURAL; INFILTRATION; INTRACAUDAL; PERINEURAL
Status: DISCONTINUED | OUTPATIENT
Start: 2022-03-22 | End: 2022-03-22 | Stop reason: HOSPADM

## 2022-03-22 RX ORDER — LABETALOL HYDROCHLORIDE 5 MG/ML
5 INJECTION, SOLUTION INTRAVENOUS EVERY 10 MIN PRN
Status: DISCONTINUED | OUTPATIENT
Start: 2022-03-22 | End: 2022-03-22 | Stop reason: HOSPADM

## 2022-03-22 RX ORDER — MEPERIDINE HYDROCHLORIDE 50 MG/ML
12.5 INJECTION INTRAMUSCULAR; INTRAVENOUS; SUBCUTANEOUS EVERY 5 MIN PRN
Status: DISCONTINUED | OUTPATIENT
Start: 2022-03-22 | End: 2022-03-22 | Stop reason: HOSPADM

## 2022-03-22 RX ORDER — PROPOFOL 10 MG/ML
INJECTION, EMULSION INTRAVENOUS PRN
Status: DISCONTINUED | OUTPATIENT
Start: 2022-03-22 | End: 2022-03-22 | Stop reason: SDUPTHER

## 2022-03-22 RX ORDER — SODIUM CHLORIDE, SODIUM LACTATE, POTASSIUM CHLORIDE, CALCIUM CHLORIDE 600; 310; 30; 20 MG/100ML; MG/100ML; MG/100ML; MG/100ML
INJECTION, SOLUTION INTRAVENOUS CONTINUOUS
Status: DISCONTINUED | OUTPATIENT
Start: 2022-03-22 | End: 2022-03-22 | Stop reason: HOSPADM

## 2022-03-22 RX ORDER — MAGNESIUM HYDROXIDE 1200 MG/15ML
LIQUID ORAL CONTINUOUS PRN
Status: COMPLETED | OUTPATIENT
Start: 2022-03-22 | End: 2022-03-22

## 2022-03-22 RX ADMIN — PROPOFOL 50 MG: 10 INJECTION, EMULSION INTRAVENOUS at 08:06

## 2022-03-22 RX ADMIN — PROPOFOL 200 MG: 10 INJECTION, EMULSION INTRAVENOUS at 08:00

## 2022-03-22 RX ADMIN — FAMOTIDINE 20 MG: 10 INJECTION, SOLUTION INTRAVENOUS at 07:06

## 2022-03-22 RX ADMIN — PROPOFOL 50 MG: 10 INJECTION, EMULSION INTRAVENOUS at 08:08

## 2022-03-22 RX ADMIN — PROPOFOL 50 MG: 10 INJECTION, EMULSION INTRAVENOUS at 08:04

## 2022-03-22 RX ADMIN — SODIUM CHLORIDE, POTASSIUM CHLORIDE, SODIUM LACTATE AND CALCIUM CHLORIDE: 600; 310; 30; 20 INJECTION, SOLUTION INTRAVENOUS at 07:01

## 2022-03-22 ASSESSMENT — PULMONARY FUNCTION TESTS
PIF_VALUE: 1
PIF_VALUE: 1
PIF_VALUE: 0
PIF_VALUE: 1
PIF_VALUE: 2
PIF_VALUE: 1
PIF_VALUE: 0
PIF_VALUE: 1

## 2022-03-22 ASSESSMENT — PAIN - FUNCTIONAL ASSESSMENT: PAIN_FUNCTIONAL_ASSESSMENT: 0-10

## 2022-03-22 NOTE — OP NOTE
OPERATIVE REPORT          Patient:  Sourav Rachel    YOB: 1995  Date of Service:  3/22/2022   Location:  [facility]    Preoperative Diagnosis:    Left carpal tunnel syndrome    Postoperative Diagnosis:    Same    Procedure:    Left carpal tunnel release      Surgeon:    Jo Mata. Kasey Tang MD    Surgical Assistant:    FRANCISCO Soria Assistant    Anesthesia:   Local with Sedation    Blood Loss:   Minimal    Complications:  None    Tourniquet Time: 3 minutes     Indications:  Ms. Sourav Rachel  is a 27y.o. year-old female with Left carpal tunnel syndrome. I have discussed preoperatively with her  the complications, limitations, expectations, alternatives and risks of surgical care, which she has understood. All of her questions have been fully answered, and she has provided written informed consent to proceed. Procedure:   After written consent was obtained and the proper operative site was identified and marked, Ms. Sourav Rachel was brought to the operating room, placed in the supine position on the operating room table with the Left arm extended upon a hand table. Under an appropriate level of sedation, local anesthetic (1% Lidocaine and 1/2% Marcaine both without Epinephrine) was instilled in the planned surgical field. Her Left upper extremity was prepped and draped in the usual sterile fashion. After Esmarch exsanguination, the pneumotourniquet was inflated to 250 mm of mercury. A 2 cm longitudinal incision was fashioned at the base of the palm, paralleling the longitudinal thenar crease. Dissection was carried carefully through the subcutaneous tissue identifying and protecting the neurovascular structures. The palmar fascia was incised longitudinally, exposing the transverse carpal ligament. The transverse carpal ligament was incised from its proximal to distal most extent, under direct visualization.  The terminal 2 cm of antebrachial fascia was similarly incised under direct visualization. The contents of the carpal tunnel were inspected and found to be free of mass, lesion or other abnormality. Digital palpation revealed no further constriction about the median nerve. The wound was irrigated copiously with sterile saline for irrigation and the pneumotourniquet was deflated after a period of 3 minutes elevation. The fingers were immediately pink & well perfused. Hemostasis was easily obtained with direct pressure and electrocautery and the wound was closed with interrupted sutures. The wound was dressed with adaptic, dry sterile dressings and a bulky soft hand & wrist dressing was applied. Ms. Milana Diop  was awakened from light sedation, having tolerated the procedure without apparent complication. She  was returned to the recovery room in stable condition. At the conclusion of the procedure all needle, instrument, and sponge counts were correct. Randolph Montgomery MD   3/22/2022, 8:13 AM

## 2022-03-22 NOTE — H&P
Pre-operative Update of H&P:    I  have seen & examined Ms. Milana Diop related solely to her hand and upper extremity conditions, prior to the scheduled procedure on the date of her surgery. The indications for the planned surgical procedure & and her upper-extremity condition are unchanged.

## 2022-03-22 NOTE — ANESTHESIA PRE PROCEDURE
Department of Anesthesiology  Preprocedure Note       Name:  Evelina Jacobson   Age:  27 y.o.  :  1995                                          MRN:  [id number]         Date:  3/22/2022      Surgeon: Maria Alejandra Newsome):  Ani Guzman MD    Procedure: Procedure(s):  LEFT CARPAL TUNNEL RELEASE    Medications prior to admission:   Prior to Admission medications    Medication Sig Start Date End Date Taking? Authorizing Provider   albuterol sulfate HFA (VENTOLIN HFA) 108 (90 Base) MCG/ACT inhaler Inhale 2 puffs into the lungs 4 times daily as needed for Wheezing 3/11/22   Eric Oiler, DO   vitamin D (ERGOCALCIFEROL) 1.25 MG (04219 UT) CAPS capsule Take 1 capsule by mouth once a week 2/15/22   Freddy Hollingsworth MD   buPROPion (WELLBUTRIN XL) 150 MG extended release tablet Take 150 mg by mouth daily 22   Historical Provider, MD   levothyroxine (SYNTHROID) 75 MCG tablet Take 1 tablet by mouth Daily 22   Freddy Hollingsworth MD   Elastic Bandages & Supports (WRIST SPLINT/COCK-UP/LEFT M) MISC 1 each by Does not apply route nightly 21   Eric Oiler, DO   diclofenac sodium (VOLTAREN) 1 % GEL Apply 2 g topically 4 times daily as needed for Pain 21   Eric Oiler, DO   Elastic Bandages & Supports (WRIST SPLINT/COCK-UP/RIGHT M) MISC 1 each by Does not apply route nightly 21   Eric Oiler, DO   sertraline (ZOLOFT) 100 MG tablet Take 150 mg by mouth daily  21   Historical Provider, MD   metFORMIN (GLUCOPHAGE) 500 MG tablet Take 1 tablet by mouth 2 times daily (with meals) 21   Freddy Holilngsworth MD   fluticasone (FLONASE) 50 MCG/ACT nasal spray 1 spray by Each Nostril route 2 times daily  Patient taking differently: 1 spray by Each Nostril route as needed  20   Jasmyne Hoyos MD   SUNOSI 150 MG TABS daily  20   Historical Provider, MD   methylphenidate (RITALIN LA) 20 MG extended release capsule Take 20 mg by mouth 2 times daily.   3/12/20   Historical Provider, MD   methylphenidate (RITALIN) 10 MG tablet Take 10 mg by mouth as needed. In addition to 20 mg bid 1/31/20   Historical Provider, MD       Current medications:    Current Facility-Administered Medications   Medication Dose Route Frequency Provider Last Rate Last Admin    lidocaine PF 1 % injection 1 mL  1 mL IntraDERmal Once PRN Pearl Nur MD        lactated ringers infusion   IntraVENous Continuous Pearl Nur  mL/hr at 03/22/22 0701 New Bag at 03/22/22 0701    sodium chloride flush 0.9 % injection 10 mL  10 mL IntraVENous 2 times per day Pearl Nur MD        sodium chloride flush 0.9 % injection 10 mL  10 mL IntraVENous PRN Pearl Nur MD        0.9 % sodium chloride infusion  25 mL IntraVENous PRN Pearl Nur MD        famotidine (PEPCID) 20 mg in sodium chloride (PF) 10 mL injection  20 mg IntraVENous Once Pearl Nur MD           Allergies:     Allergies   Allergen Reactions    Latex Rash       Problem List:    Patient Active Problem List   Diagnosis Code    Hashimoto's thyroiditis E06.3    Vitamin D insufficiency E55.9    Pain in both knees M25.561, M25.562    Asthma in adult J45.909    Persistent depressive disorder F34.1    Family history of breast cancer Z80.3    PCOS (polycystic ovarian syndrome) E28.2    Acquired hypothyroidism E03.9    Class 2 obesity with body mass index (BMI) of 37.0 to 37.9 in adult E66.9, Z68.37    Carpal tunnel syndrome, bilateral G56.03       Past Medical History:        Diagnosis Date    Allergic rhinitis     Asthma     Breast cyst     Reports a cyst in right breast as a teenager- not sure of breast imaging     Dizziness     Hashimoto's thyroiditis     Headache     HPV (human papilloma virus) infection     Hypersomnia     PCOS (polycystic ovarian syndrome)        Past Surgical History:        Procedure Laterality Date    BREAST ENHANCEMENT SURGERY      COLPOSCOPY      TONSILLECTOMY AND ADENOIDECTOMY      WISDOM TOOTH EXTRACTION         Social History:    Social History     Tobacco Use    Smoking status: Former Smoker     Quit date: 3/14/2021     Years since quittin.0    Smokeless tobacco: Never Used   Substance Use Topics    Alcohol use: Yes     Comment: 2-3 drinks per week                                Counseling given: Not Answered      Vital Signs (Current):   Vitals:    22 1439 22 0655   BP:  126/82   Pulse:  88   Resp:  16   Temp:  98.1 °F (36.7 °C)   TempSrc:  Temporal   SpO2:  95%   Weight: 190 lb (86.2 kg) 190 lb (86.2 kg)   Height: 5' 1\" (1.549 m) 5' 1\" (1.549 m)                                              BP Readings from Last 3 Encounters:   22 126/82   22 120/82   22 112/78       NPO Status: Time of last liquid consumption:                         Time of last solid consumption:                         Date of last liquid consumption: 22                        Date of last solid food consumption: 22    BMI:   Wt Readings from Last 3 Encounters:   22 190 lb (86.2 kg)   22 191 lb (86.6 kg)   22 194 lb (88 kg)     Body mass index is 35.9 kg/m².     CBC: No results found for: WBC, RBC, HGB, HCT, MCV, RDW, PLT    CMP:   Lab Results   Component Value Date     2021    K 4.6 2021     2021    CO2 21 2021    BUN 5 2021    CREATININE 0.5 2021    GFRAA >60 2021    AGRATIO 1.4 2021    LABGLOM >60 2021    GLUCOSE 80 2021    PROT 6.8 2021    CALCIUM 9.0 2021    BILITOT <0.2 2021    ALKPHOS 89 2021    AST 18 2021    ALT 14 2021       POC Tests:   Recent Labs     22  0658   POCGLU 91       Coags: No results found for: PROTIME, INR, APTT    HCG (If Applicable):   Lab Results   Component Value Date    PREGTESTUR Negative 2022        ABGs: No results found for: PHART, PO2ART, BVH0VPS, BCX3PGG, BEART, Z4MRPKXV     Type & Screen (If Applicable):  No results found for: LABABO, LABRH    Drug/Infectious Status (If Applicable):  No results found for: HIV, HEPCAB    COVID-19 Screening (If Applicable):   Lab Results   Component Value Date    COVID19 Not Detected 2022           Anesthesia Evaluation  Patient summary reviewed no history of anesthetic complications:   Airway: Mallampati: II  TM distance: >3 FB   Neck ROM: full  Mouth opening: > = 3 FB Dental: normal exam         Pulmonary:Negative Pulmonary ROS and normal exam  breath sounds clear to auscultation  (+) asthma:                            Cardiovascular:Negative CV ROS  Exercise tolerance: good (>4 METS),           Rhythm: regular  Rate: normal                    Neuro/Psych:   Negative Neuro/Psych ROS  (+) neuromuscular disease:, headaches:, psychiatric history:            GI/Hepatic/Renal: Neg GI/Hepatic/Renal ROS  (+) GERD: well controlled,           Endo/Other: Negative Endo/Other ROS   (+) hypothyroidism::., .                 Abdominal:             Vascular: negative vascular ROS. Other Findings:          Pre-Operative Diagnosis: Left carpal tunnel syndrome [G56.02]    27 y.o.   BMI:  Body mass index is 35.9 kg/m².      Vitals:    22 1439 22 0655   BP:  126/82   Pulse:  88   Resp:  16   Temp:  98.1 °F (36.7 °C)   TempSrc:  Temporal   SpO2:  95%   Weight: 190 lb (86.2 kg) 190 lb (86.2 kg)   Height: 5' 1\" (1.549 m) 5' 1\" (1.549 m)       Allergies   Allergen Reactions    Latex Rash       Social History     Tobacco Use    Smoking status: Former Smoker     Quit date: 3/14/2021     Years since quittin.0    Smokeless tobacco: Never Used   Substance Use Topics    Alcohol use: Yes     Comment: 2-3 drinks per week       LABS:    CBC  No results found for: WBC, HGB, HCT, PLT  RENAL  Lab Results   Component Value Date/Time     2021 09:50 AM    K 4.6 2021 09:50 AM     2021 09:50 AM    CO2 21 2021 09:50 AM    BUN 5 (L) 2021 09:50 AM    CREATININE 0.5 (L) 09/28/2021 09:50 AM    GLUCOSE 80 09/28/2021 09:50 AM     COAGS  No results found for: PROTIME, INR, APTT          Anesthesia Plan      MAC     ASA 2     (I discussed with the patient the risks and benefits of PIV, anesthesia, IV Narcotics, PACU. All questions were answered the patient agrees with the plan and wishes to proceed)  Induction: intravenous.                           Rayo Zapien MD   3/22/2022

## 2022-03-22 NOTE — ANESTHESIA POSTPROCEDURE EVALUATION
Department of Anesthesiology  Postprocedure Note    Patient: Boy Morrow  MRN: [id number]  YOB: 1995  Date of evaluation: 3/22/2022  Time:  11:02 AM     Procedure Summary     Date: 03/22/22 Room / Location: [address]    Anesthesia Start: 1736 Anesthesia Stop: 9623    Procedure: LEFT CARPAL TUNNEL RELEASE (Left Fingers) Diagnosis:       Left carpal tunnel syndrome      (Left carpal tunnel syndrome [G56.02])    Surgeons: Eleanor Salazar MD Responsible Provider: Narciso Lindsay MD    Anesthesia Type: MAC ASA Status: 2          Anesthesia Type: MAC    Aristeo Phase I: Aristeo Score: 4    Aristeo Phase II: Aristeo Score: 10    Last vitals: Reviewed and per EMR flowsheets.        Anesthesia Post Evaluation    Comments: Postoperative Anesthesia Note    Name:    Boy Morrow  MRN:      [id number]    Patient Vitals in the past 12 hrs:  03/22/22 0850, BP:107/67, Pulse:76, Resp:17, SpO2:99 %  03/22/22 0845, BP:114/80, Pulse:80, Resp:20, SpO2:100 %  03/22/22 0840, BP:104/66, Pulse:88, Resp:16, SpO2:95 %  03/22/22 0835, BP:106/69, Pulse:94, Resp:15, SpO2:95 %  03/22/22 0830, BP:95/63, Pulse:78, Resp:15, SpO2:100 %  03/22/22 0825, BP:95/60, Temp:97.4 °F (36.3 °C), Temp src:Temporal, Pulse:79, Resp:15, SpO2:99 %  03/22/22 0820, BP:(!) 93/55, Pulse:87, Resp:16, SpO2:96 %  03/22/22 0818, Pulse:88  03/22/22 0815, BP:(!) 91/52, Temp:97.2 °F (36.2 °C), Temp src:Temporal, Pulse:89, Resp:16, SpO2:90 %  03/22/22 0655, BP:126/82, Temp:98.1 °F (36.7 °C), Temp src:Temporal, Pulse:88, Resp:16, SpO2:95 %, Height:5' 1\" (1.549 m), Weight:190 lb (86.2 kg)     LABS:    CBC  No results found for: WBC, HGB, HCT, PLT  RENAL  Lab Results       Component                Value               Date/Time                  NA                       139                 09/28/2021 09:50 AM        K                        4.6                 09/28/2021 09:50 AM        CL                       103                 09/28/2021 09:50 AM        CO2                      21                  09/28/2021 09:50 AM        BUN                      5 (L)               09/28/2021 09:50 AM        CREATININE               0.5 (L)             09/28/2021 09:50 AM        GLUCOSE                  80                  09/28/2021 09:50 AM   COAGS  No results found for: PROTIME, INR, APTT    Intake & Output:  @80BEBL@    Nausea & Vomiting:  No    Level of Consciousness:  Awake    Pain Assessment:  Adequate analgesia    Anesthesia Complications:  No apparent anesthetic complications    SUMMARY      Vital signs stable  OK to discharge from Stage I post anesthesia care.   Care transferred from Anesthesiology department on discharge from perioperative area

## 2022-03-24 ENCOUNTER — TELEPHONE (OUTPATIENT)
Dept: ORTHOPEDIC SURGERY | Age: 27
End: 2022-03-24

## 2022-03-24 NOTE — TELEPHONE ENCOUNTER
General Question     Subject: PATIENT HAD CARPAL TUNNEL SURGERY ON LEFT HAND ON Tuesday, MARCH 22, 2022. SHE WOULD LIKE TO KNOW IF SOME BRUISING IS NORMAL OR EXPECTED - NEAR THE THUMB - PALM AREA. NO DISCHARGE, NO WARM TO TOUCH - USING ICE PACK. NO FEVER. PLEASE ADVISE.     Patient:  Cher Collet  Contact Number: [phone]

## 2022-03-30 ENCOUNTER — OFFICE VISIT (OUTPATIENT)
Dept: ORTHOPEDIC SURGERY | Age: 27
End: 2022-03-30

## 2022-03-30 VITALS — HEIGHT: 61 IN | BODY MASS INDEX: 35.87 KG/M2 | WEIGHT: 190 LBS | RESPIRATION RATE: 16 BRPM

## 2022-03-30 DIAGNOSIS — G56.01 CARPAL TUNNEL SYNDROME ON RIGHT: Primary | ICD-10-CM

## 2022-03-30 PROCEDURE — 99024 POSTOP FOLLOW-UP VISIT: CPT | Performed by: ORTHOPAEDIC SURGERY

## 2022-03-30 NOTE — PROGRESS NOTES

## 2022-03-30 NOTE — PROGRESS NOTES
Ms. Ori Betancourt returns today in follow-up of her recent left Carpal Tunnel Release done approximately 1 week ago. She has done well noting no discomfort and no other reported complications. She notes pre-operative symptoms to be significantly improved at this time. Physical Exam:  Skin incision is healing well, without erythema, drainage or sign of infection. Digital range of motion is limited by pain, passively full range of motion. Wrist range of motion is Full. Sensation is improved from preoperatvely, residual numbness at the tip of ring and middle fingers. Vascular examination reveals normal, good capillary refill and good color. Swelling is minimal.  Sensory and Motor Median Nerve function is intact. Impression:  Ms. Ori Betancourt is doing well after recent left Carpal Tunnel Release. Plan:  Ms. Ori Betancourt is instructed in work on Active & Passive range of motion of the digits, wrist, & elbow. These modalities were specifically demonstrated to her today. We discussed the appropriateness of gradual resumption of use of the operated hand and the return to normal use as comfort allows. She is given instructions regarding management of the fresh surgical incision and progressive use of desensitization and tissue massage techniques. We discussed the appropriate expectations and timeline for symptom improvement. She is provided a written patient instruction sheet titled: Postoperative Instructions After Carpal Tunnel Release. I have asked Ms. Ori Betancourt to follow-up with me or contact me by telephone over the next 2-4 weeks if her symptoms have not fully resolved or if she has not regained full & painless return of function. She is also specifically instructed to return to the office or call for an appointment sooner if her symptoms are changing or worsening prior to that time.

## 2022-03-30 NOTE — PATIENT INSTRUCTIONS
Postoperative Instructions After Carpal Tunnel Release    Dr. Andrez Colon        1. After bandages are removed one week from surgery, you may chose to wear a small bandage over the incision if you wish, though you do not need to. 2. Keep incision dry until sutures have fully dissolved  or it has been 12 - 14 days since your surgery. Thereafter, you may wash with mild soap and water and shower normally. 3. Work hard on motion of the fingers and wrist, straightening each finger fully and bending each finger fully, bending wrist forward and bending wrist backwards. Do not be concerned if you experience discomfort. This will not damage the surgery. 4. You may begin using the hand as it feels comfortable beginning 12 - 14 days from the day of surgery. You may not feel entirely comfortable gripping or lifting heavy objects for several weeks. 5. You may expect to see some skin peel off around the incision. You may be left with a small area of pink baby skin. This is quite normal.  6. Once your stiches have fully disappeared & skin appears normal, you should begin gently massaging the incision with Vitamin E (may use Vitamin E lotion or contents of Vitamin E capsule). Thank you for choosing [facility] for your Hand and Upper Extremity needs. If we can be of any further assistance to you, please do not hesitate to contact us.     Office Phone Number:  [phone]  or  [phone]

## 2022-03-30 NOTE — PROGRESS NOTES
Obstructive Sleep Apnea (SAIRA) Screening     Patient:  Ramo Bradley    YOB: 1995      Medical Record #:  [id number]                     Date:  3/30/2022     1. Are you a loud and/or regular snorer? []  Yes       [x] No    2. Have you been observed to gasp or stop breathing during sleep? []  Yes       [x] No    3. Do you feel tired or groggy upon awakening or do you awaken with a headache?           []  Yes       [] No    4. Are you often tired or fatigued during the wake time hours? []  Yes       [] No    5. Do you fall asleep sitting, reading, watching TV or driving? []  Yes       [] No    6. Do you often have problems with memory or concentration? []  Yes       [] No    **If patient's score is ? 3 they are considered high risk for SAIRA. An Anesthesia provider will evaluate the patient and develop a plan of care the day of surgery. Note:  If the patient's BMI is more than 35 kg m¯² , has neck circumference > 40 cm, and/or high blood pressure the risk is greater (© American [employer]nea [employer], 2006).

## 2022-04-05 ENCOUNTER — HOSPITAL ENCOUNTER (OUTPATIENT)
Age: 27
Setting detail: OUTPATIENT SURGERY
Discharge: HOME | End: 2022-04-05
Attending: ORTHOPAEDIC SURGERY | Admitting: ORTHOPAEDIC SURGERY
Payer: COMMERCIAL

## 2022-04-05 ENCOUNTER — ANESTHESIA (OUTPATIENT)
Dept: OPERATING ROOM | Age: 27
End: 2022-04-05
Payer: COMMERCIAL

## 2022-04-05 ENCOUNTER — ANESTHESIA EVENT (OUTPATIENT)
Dept: OPERATING ROOM | Age: 27
End: 2022-04-05
Payer: COMMERCIAL

## 2022-04-05 VITALS
DIASTOLIC BLOOD PRESSURE: 65 MMHG | HEIGHT: 61 IN | OXYGEN SATURATION: 99 % | TEMPERATURE: 97.8 F | HEART RATE: 68 BPM | RESPIRATION RATE: 18 BRPM | WEIGHT: 190 LBS | BODY MASS INDEX: 35.87 KG/M2 | SYSTOLIC BLOOD PRESSURE: 103 MMHG

## 2022-04-05 VITALS — OXYGEN SATURATION: 90 % | SYSTOLIC BLOOD PRESSURE: 94 MMHG | DIASTOLIC BLOOD PRESSURE: 56 MMHG

## 2022-04-05 LAB — PREGNANCY, URINE: NEGATIVE

## 2022-04-05 PROCEDURE — 84703 CHORIONIC GONADOTROPIN ASSAY: CPT

## 2022-04-05 PROCEDURE — 64721 CARPAL TUNNEL SURGERY: CPT | Performed by: ORTHOPAEDIC SURGERY

## 2022-04-05 PROCEDURE — 3700000001 HC ADD 15 MINUTES (ANESTHESIA): Performed by: ORTHOPAEDIC SURGERY

## 2022-04-05 PROCEDURE — 6360000002 HC RX W HCPCS: Performed by: NURSE ANESTHETIST, CERTIFIED REGISTERED

## 2022-04-05 PROCEDURE — A4217 STERILE WATER/SALINE, 500 ML: HCPCS | Performed by: ORTHOPAEDIC SURGERY

## 2022-04-05 PROCEDURE — 2500000003 HC RX 250 WO HCPCS: Performed by: ORTHOPAEDIC SURGERY

## 2022-04-05 PROCEDURE — 3600000013 HC SURGERY LEVEL 3 ADDTL 15MIN: Performed by: ORTHOPAEDIC SURGERY

## 2022-04-05 PROCEDURE — 3700000000 HC ANESTHESIA ATTENDED CARE: Performed by: ORTHOPAEDIC SURGERY

## 2022-04-05 PROCEDURE — 2580000003 HC RX 258: Performed by: ORTHOPAEDIC SURGERY

## 2022-04-05 PROCEDURE — 7100000011 HC PHASE II RECOVERY - ADDTL 15 MIN: Performed by: ORTHOPAEDIC SURGERY

## 2022-04-05 PROCEDURE — 3600000003 HC SURGERY LEVEL 3 BASE: Performed by: ORTHOPAEDIC SURGERY

## 2022-04-05 PROCEDURE — 2709999900 HC NON-CHARGEABLE SUPPLY: Performed by: ORTHOPAEDIC SURGERY

## 2022-04-05 PROCEDURE — 2580000003 HC RX 258: Performed by: ANESTHESIOLOGY

## 2022-04-05 PROCEDURE — 7100000010 HC PHASE II RECOVERY - FIRST 15 MIN: Performed by: ORTHOPAEDIC SURGERY

## 2022-04-05 PROCEDURE — 2500000003 HC RX 250 WO HCPCS: Performed by: NURSE ANESTHETIST, CERTIFIED REGISTERED

## 2022-04-05 RX ORDER — OXYCODONE HYDROCHLORIDE 5 MG/1
10 TABLET ORAL PRN
Status: DISCONTINUED | OUTPATIENT
Start: 2022-04-05 | End: 2022-04-05 | Stop reason: HOSPADM

## 2022-04-05 RX ORDER — DIPHENHYDRAMINE HYDROCHLORIDE 50 MG/ML
12.5 INJECTION INTRAMUSCULAR; INTRAVENOUS
Status: DISCONTINUED | OUTPATIENT
Start: 2022-04-05 | End: 2022-04-05 | Stop reason: HOSPADM

## 2022-04-05 RX ORDER — SODIUM CHLORIDE 0.9 % (FLUSH) 0.9 %
5-40 SYRINGE (ML) INJECTION PRN
Status: DISCONTINUED | OUTPATIENT
Start: 2022-04-05 | End: 2022-04-05 | Stop reason: HOSPADM

## 2022-04-05 RX ORDER — MAGNESIUM HYDROXIDE 1200 MG/15ML
LIQUID ORAL CONTINUOUS PRN
Status: COMPLETED | OUTPATIENT
Start: 2022-04-05 | End: 2022-04-05

## 2022-04-05 RX ORDER — ONDANSETRON 2 MG/ML
4 INJECTION INTRAMUSCULAR; INTRAVENOUS
Status: DISCONTINUED | OUTPATIENT
Start: 2022-04-05 | End: 2022-04-05 | Stop reason: HOSPADM

## 2022-04-05 RX ORDER — SODIUM CHLORIDE 9 MG/ML
INJECTION, SOLUTION INTRAVENOUS PRN
Status: DISCONTINUED | OUTPATIENT
Start: 2022-04-05 | End: 2022-04-05 | Stop reason: HOSPADM

## 2022-04-05 RX ORDER — LIDOCAINE HYDROCHLORIDE 10 MG/ML
INJECTION, SOLUTION EPIDURAL; INFILTRATION; INTRACAUDAL; PERINEURAL PRN
Status: DISCONTINUED | OUTPATIENT
Start: 2022-04-05 | End: 2022-04-05 | Stop reason: SDUPTHER

## 2022-04-05 RX ORDER — LABETALOL HYDROCHLORIDE 5 MG/ML
5 INJECTION, SOLUTION INTRAVENOUS EVERY 10 MIN PRN
Status: DISCONTINUED | OUTPATIENT
Start: 2022-04-05 | End: 2022-04-05 | Stop reason: HOSPADM

## 2022-04-05 RX ORDER — SODIUM CHLORIDE, SODIUM LACTATE, POTASSIUM CHLORIDE, CALCIUM CHLORIDE 600; 310; 30; 20 MG/100ML; MG/100ML; MG/100ML; MG/100ML
INJECTION, SOLUTION INTRAVENOUS CONTINUOUS
Status: DISCONTINUED | OUTPATIENT
Start: 2022-04-05 | End: 2022-04-05 | Stop reason: HOSPADM

## 2022-04-05 RX ORDER — PROPOFOL 10 MG/ML
INJECTION, EMULSION INTRAVENOUS PRN
Status: DISCONTINUED | OUTPATIENT
Start: 2022-04-05 | End: 2022-04-05 | Stop reason: SDUPTHER

## 2022-04-05 RX ORDER — SODIUM CHLORIDE 0.9 % (FLUSH) 0.9 %
5-40 SYRINGE (ML) INJECTION EVERY 12 HOURS SCHEDULED
Status: DISCONTINUED | OUTPATIENT
Start: 2022-04-05 | End: 2022-04-05 | Stop reason: HOSPADM

## 2022-04-05 RX ORDER — LIDOCAINE HYDROCHLORIDE 10 MG/ML
0.3 INJECTION, SOLUTION EPIDURAL; INFILTRATION; INTRACAUDAL; PERINEURAL
Status: DISCONTINUED | OUTPATIENT
Start: 2022-04-05 | End: 2022-04-05 | Stop reason: HOSPADM

## 2022-04-05 RX ORDER — SODIUM CHLORIDE 9 MG/ML
25 INJECTION, SOLUTION INTRAVENOUS PRN
Status: DISCONTINUED | OUTPATIENT
Start: 2022-04-05 | End: 2022-04-05 | Stop reason: HOSPADM

## 2022-04-05 RX ORDER — MEPERIDINE HYDROCHLORIDE 50 MG/ML
12.5 INJECTION INTRAMUSCULAR; INTRAVENOUS; SUBCUTANEOUS EVERY 5 MIN PRN
Status: DISCONTINUED | OUTPATIENT
Start: 2022-04-05 | End: 2022-04-05 | Stop reason: HOSPADM

## 2022-04-05 RX ORDER — OXYCODONE HYDROCHLORIDE 5 MG/1
5 TABLET ORAL PRN
Status: DISCONTINUED | OUTPATIENT
Start: 2022-04-05 | End: 2022-04-05 | Stop reason: HOSPADM

## 2022-04-05 RX ADMIN — PROPOFOL 200 MG: 10 INJECTION, EMULSION INTRAVENOUS at 07:13

## 2022-04-05 RX ADMIN — PROPOFOL 50 MG: 10 INJECTION, EMULSION INTRAVENOUS at 07:20

## 2022-04-05 RX ADMIN — PROPOFOL 50 MG: 10 INJECTION, EMULSION INTRAVENOUS at 07:22

## 2022-04-05 RX ADMIN — SODIUM CHLORIDE, POTASSIUM CHLORIDE, SODIUM LACTATE AND CALCIUM CHLORIDE: 600; 310; 30; 20 INJECTION, SOLUTION INTRAVENOUS at 06:29

## 2022-04-05 RX ADMIN — PROPOFOL 50 MG: 10 INJECTION, EMULSION INTRAVENOUS at 07:17

## 2022-04-05 RX ADMIN — LIDOCAINE HYDROCHLORIDE 60 MG: 10 INJECTION, SOLUTION EPIDURAL; INFILTRATION; INTRACAUDAL; PERINEURAL at 07:13

## 2022-04-05 ASSESSMENT — PAIN SCALES - GENERAL
PAINLEVEL_OUTOF10: 0

## 2022-04-05 NOTE — ANESTHESIA POSTPROCEDURE EVALUATION
Department of Anesthesiology  Postprocedure Note    Patient: Shanell Pritchard  MRN: [id number]  YOB: 1995  Date of evaluation: 4/5/2022  Time:  1:19 PM     Procedure Summary     Date: 04/05/22 Room / Location: [address] [place] [address]    Anesthesia Start: 3039 Anesthesia Stop: 6078    Procedure: RIGHT CARPAL TUNNEL RELEASE (Right Fingers) Diagnosis:       Right carpal tunnel syndrome      (Right carpal tunnel syndrome [G56.01])    Surgeons: Wei Linares MD Responsible Provider: Carolina Ambriz MD    Anesthesia Type: MAC ASA Status: 2          Anesthesia Type: MAC    Aristeo Phase I: Aristeo Score: 10    Aristeo Phase II: Aristeo Score: 8    Last vitals: Reviewed and per EMR flowsheets.        Anesthesia Post Evaluation    Comments: Postoperative Anesthesia Note    Name:    Shanell Pritchard  MRN:      [id number]    Patient Vitals in the past 12 hrs:  04/05/22 0810, BP:103/65, Resp:18, SpO2:99 %  04/05/22 0800, BP:92/63, Temp:97.8 °F (36.6 °C), Temp src:Temporal, Resp:18, SpO2:96 %  04/05/22 0745, BP:91/71, Pulse:68, Resp:18, SpO2:98 %  04/05/22 0738, BP:105/72, Pulse:78, Resp:18, SpO2:98 %  04/05/22 0733, BP:107/60, Pulse:73, Resp:18, SpO2:98 %  04/05/22 0728, BP:100/68, Temp:98 °F (36.7 °C), Temp src:Temporal, Pulse:73, Resp:18, SpO2:98 %  04/05/22 0625, BP:108/76, Temp:96.8 °F (36 °C), Pulse:82, Resp:16, SpO2:98 %     LABS:    CBC  No results found for: WBC, HGB, HCT, PLT  RENAL  Lab Results       Component                Value               Date/Time                  NA                       139                 09/28/2021 09:50 AM        K                        4.6                 09/28/2021 09:50 AM        CL                       103                 09/28/2021 09:50 AM        CO2                      21                  09/28/2021 09:50 AM        BUN                      5 (L)               09/28/2021 09:50 AM        CREATININE               0.5 (L)             09/28/2021 09:50 AM GLUCOSE                  80                  09/28/2021 09:50 AM   COAGS  No results found for: PROTIME, INR, APTT    Intake & Output:  @47UCXB@    Nausea & Vomiting:  No    Level of Consciousness:  Awake    Pain Assessment:  Adequate analgesia    Anesthesia Complications:  No apparent anesthetic complications    SUMMARY      Vital signs stable  OK to discharge from Stage I post anesthesia care.   Care transferred from Anesthesiology department on discharge from perioperative area

## 2022-04-05 NOTE — ANESTHESIA PRE PROCEDURE
Department of Anesthesiology  Preprocedure Note       Name:  Jessica Allen   Age:  27 y.o.  :  1995                                          MRN:  [id number]         Date:  2022      Surgeon: Fabian Levy):  Bright Figueroa MD    Procedure: Procedure(s):  RIGHT CARPAL TUNNEL RELEASE    Medications prior to admission:   Prior to Admission medications    Medication Sig Start Date End Date Taking? Authorizing Provider   albuterol sulfate HFA (VENTOLIN HFA) 108 (90 Base) MCG/ACT inhaler Inhale 2 puffs into the lungs 4 times daily as needed for Wheezing 3/11/22   Colleen Olp, DO   vitamin D (ERGOCALCIFEROL) 1.25 MG (69041 UT) CAPS capsule Take 1 capsule by mouth once a week 2/15/22   Doretha Moya MD   buPROPion (WELLBUTRIN XL) 150 MG extended release tablet Take 150 mg by mouth daily 22   Historical Provider, MD   levothyroxine (SYNTHROID) 75 MCG tablet Take 1 tablet by mouth Daily 22   Doretha Moya MD   Elastic Bandages & Supports (WRIST SPLINT/COCK-UP/LEFT M) MISC 1 each by Does not apply route nightly 21   Colleen Olp, DO   diclofenac sodium (VOLTAREN) 1 % GEL Apply 2 g topically 4 times daily as needed for Pain 21   Colleen Olp, DO   Elastic Bandages & Supports (WRIST SPLINT/COCK-UP/RIGHT M) MISC 1 each by Does not apply route nightly 21   Colleen Olp, DO   sertraline (ZOLOFT) 100 MG tablet Take 150 mg by mouth daily  21   Historical Provider, MD   metFORMIN (GLUCOPHAGE) 500 MG tablet Take 1 tablet by mouth 2 times daily (with meals) 21   Doretha Moya MD   fluticasone (FLONASE) 50 MCG/ACT nasal spray 1 spray by Each Nostril route 2 times daily  Patient taking differently: 1 spray by Each Nostril route as needed  20   Bola Baeza MD   SUNOSI 150 MG TABS daily  20   Historical Provider, MD   methylphenidate (RITALIN LA) 20 MG extended release capsule Take 20 mg by mouth 2 times daily.   3/12/20   Historical Provider, MD   methylphenidate (RITALIN) 10 MG tablet Take 10 mg by mouth as needed. In addition to 20 mg bid 1/31/20   Historical Provider, MD       Current medications:    Current Facility-Administered Medications   Medication Dose Route Frequency Provider Last Rate Last Admin    lidocaine PF 1 % injection 0.3 mL  0.3 mL IntraDERmal Once PRN Dayanara Seymour MD        lactated ringers infusion   IntraVENous Continuous Dayanara Seymour  mL/hr at 04/05/22 0629 New Bag at 04/05/22 0629    sodium chloride flush 0.9 % injection 5-40 mL  5-40 mL IntraVENous 2 times per day Dayanara Seymour MD        sodium chloride flush 0.9 % injection 5-40 mL  5-40 mL IntraVENous PRN Dayanara Seymour MD        0.9 % sodium chloride infusion  25 mL IntraVENous PRN Dayanara Seymour MD           Allergies:     Allergies   Allergen Reactions    Latex Rash       Problem List:    Patient Active Problem List   Diagnosis Code    Hashimoto's thyroiditis E06.3    Vitamin D insufficiency E55.9    Pain in both knees M25.561, M25.562    Asthma in adult J45.909    Persistent depressive disorder F34.1    Family history of breast cancer Z80.3    PCOS (polycystic ovarian syndrome) E28.2    Acquired hypothyroidism E03.9    Class 2 obesity with body mass index (BMI) of 37.0 to 37.9 in adult E66.9, Z68.37    Carpal tunnel syndrome, bilateral G56.03    Carpal tunnel syndrome G56.00       Past Medical History:        Diagnosis Date    Allergic rhinitis     Asthma     Breast cyst     Reports a cyst in right breast as a teenager- not sure of breast imaging     Dizziness     Hashimoto's thyroiditis     Headache     HPV (human papilloma virus) infection     Hypersomnia     PCOS (polycystic ovarian syndrome)        Past Surgical History:        Procedure Laterality Date    BREAST ENHANCEMENT SURGERY      CARPAL TUNNEL RELEASE Left 3/22/2022    LEFT CARPAL TUNNEL RELEASE performed by Jessica Mcgowan MD at [address] [facility]      WISDOM TOOTH EXTRACTION         Social History:    Social History     Tobacco Use    Smoking status: Former Smoker     Quit date: 3/14/2021     Years since quittin.0    Smokeless tobacco: Never Used   Substance Use Topics    Alcohol use: Yes     Comment: 2-3 drinks per week                                Counseling given: Not Answered      Vital Signs (Current):   Vitals:    22 0915 22 0625   BP:  108/76   Pulse:  82   Resp:  16   Temp:  96.8 °F (36 °C)   SpO2:  98%   Weight: 190 lb (86.2 kg)    Height: 5' 1\" (1.549 m)                                               BP Readings from Last 3 Encounters:   22 108/76   22 106/76   22 107/67       NPO Status: Time of last liquid consumption:                         Time of last solid consumption:                         Date of last liquid consumption: 22                        Date of last solid food consumption: 22    BMI:   Wt Readings from Last 3 Encounters:   22 190 lb (86.2 kg)   22 190 lb (86.2 kg)   22 190 lb (86.2 kg)     Body mass index is 35.9 kg/m². CBC: No results found for: WBC, RBC, HGB, HCT, MCV, RDW, PLT    CMP:   Lab Results   Component Value Date     2021    K 4.6 2021     2021    CO2 21 2021    BUN 5 2021    CREATININE 0.5 2021    GFRAA >60 2021    AGRATIO 1.4 2021    LABGLOM >60 2021    GLUCOSE 80 2021    PROT 6.8 2021    CALCIUM 9.0 2021    BILITOT <0.2 2021    ALKPHOS 89 2021    AST 18 2021    ALT 14 2021       POC Tests: No results for input(s): POCGLU, POCNA, POCK, POCCL, POCBUN, POCHEMO, POCHCT in the last 72 hours.     Coags: No results found for: PROTIME, INR, APTT    HCG (If Applicable):   Lab Results   Component Value Date    PREGTESTUR Negative 2022        ABGs: No results found for: PHART, PO2ART, XOE9VJB, OOU0EDA, BEART, X9ZBRNOW Type & Screen (If Applicable):  No results found for: LABABO, LABRH    Drug/Infectious Status (If Applicable):  No results found for: HIV, HEPCAB    COVID-19 Screening (If Applicable):   Lab Results   Component Value Date    COVID19 Not Detected 2022           Anesthesia Evaluation  Patient summary reviewed no history of anesthetic complications:   Airway: Mallampati: II  TM distance: >3 FB   Neck ROM: full  Mouth opening: > = 3 FB Dental: normal exam         Pulmonary:Negative Pulmonary ROS and normal exam  breath sounds clear to auscultation  (+) asthma:                            Cardiovascular:Negative CV ROS  Exercise tolerance: good (>4 METS),           Rhythm: regular  Rate: normal                    Neuro/Psych:   Negative Neuro/Psych ROS  (+) neuromuscular disease:, headaches:, psychiatric history:            GI/Hepatic/Renal: Neg GI/Hepatic/Renal ROS  (+) GERD: well controlled,           Endo/Other: Negative Endo/Other ROS   (+) hypothyroidism::., .                 Abdominal:             Vascular: negative vascular ROS. Other Findings:          Pre-Operative Diagnosis: Right carpal tunnel syndrome [G56.01]    27 y.o.   BMI:  Body mass index is 35.9 kg/m².      Vitals:    22 0915 22 0625   BP:  108/76   Pulse:  82   Resp:  16   Temp:  96.8 °F (36 °C)   SpO2:  98%   Weight: 190 lb (86.2 kg)    Height: 5' 1\" (1.549 m)        Allergies   Allergen Reactions    Latex Rash       Social History     Tobacco Use    Smoking status: Former Smoker     Quit date: 3/14/2021     Years since quittin.0    Smokeless tobacco: Never Used   Substance Use Topics    Alcohol use: Yes     Comment: 2-3 drinks per week       LABS:    CBC  No results found for: WBC, HGB, HCT, PLT  RENAL  Lab Results   Component Value Date/Time     2021 09:50 AM    K 4.6 2021 09:50 AM     2021 09:50 AM    CO2 21 2021 09:50 AM    BUN 5 (L) 2021 09:50 AM    CREATININE 0.5 (L) 09/28/2021 09:50 AM    GLUCOSE 80 09/28/2021 09:50 AM     COAGS  No results found for: PROTIME, INR, APTT          Anesthesia Plan      MAC     ASA 2     (I discussed with the patient the risks and benefits of PIV, anesthesia, IV Narcotics, PACU. All questions were answered the patient agrees with the plan and wishes to proceed)  Induction: intravenous.                           Giuliano Garcia MD   4/5/2022

## 2022-04-05 NOTE — PROGRESS NOTES
Patient received from OR, oral airway discontinued on arrival by Nba Marmolejo CRNA, VSS, 02 3 l n/c applied for sats at 88% with brisk response to 95%. Patient very drowsy, noted rodriguez, fell back to sleep.

## 2022-04-05 NOTE — H&P
Pre-operative Update of H&P:    I  have seen & examined Ms. Jamir Ortiz related solely to her hand and upper extremity conditions, prior to the scheduled procedure on the date of her surgery. The indications for the planned surgical procedure & and her upper-extremity condition are unchanged.

## 2022-04-05 NOTE — OP NOTE
OPERATIVE REPORT          Patient:  Mario Pinto    YOB: 1995  Date of Service:  4/5/2022   Location:  [address]    Preoperative Diagnosis:    Right carpal tunnel syndrome    Postoperative Diagnosis:    Same    Procedure:    Right carpal tunnel release      Surgeon:    Zara Gordon. Ponce Ruiz MD    Surgical Assistant:    FRANCISCO Soria Assistant    Anesthesia:   Local with Sedation    Blood Loss:   Minimal    Complications:  None    Tourniquet Time: 3 minutes     Indications:  Ms. Mario Pinto  is a 27y.o. year-old female with Right carpal tunnel syndrome. I have discussed preoperatively with her  the complications, limitations, expectations, alternatives and risks of surgical care, which she has understood. All of her questions have been fully answered, and she has provided written informed consent to proceed. Procedure:   After written consent was obtained and the proper operative site was identified and marked, Ms. Mario Pinto was brought to the operating room, placed in the supine position on the operating room table with the Right arm extended upon a hand table. Under an appropriate level of sedation, local anesthetic (1% Lidocaine and 1/2% Marcaine both without Epinephrine) was instilled in the planned surgical field. Her Right upper extremity was prepped and draped in the usual sterile fashion. After Esmarch exsanguination, the pneumotourniquet was inflated to 250 mm of mercury. A 2 cm longitudinal incision was fashioned at the base of the palm, paralleling the longitudinal thenar crease. Dissection was carried carefully through the subcutaneous tissue identifying and protecting the neurovascular structures. The palmar fascia was incised longitudinally, exposing the transverse carpal ligament. The transverse carpal ligament was incised from its proximal to distal most extent, under direct visualization.  The terminal 2 cm of antebrachial fascia was similarly incised under direct visualization. The contents of the carpal tunnel were inspected and found to be free of mass, lesion or other abnormality. Digital palpation revealed no further constriction about the median nerve. The wound was irrigated copiously with sterile saline for irrigation and the pneumotourniquet was deflated after a period of 3 minutes elevation. The fingers were immediately pink & well perfused. Hemostasis was easily obtained with direct pressure and electrocautery and the wound was closed with interrupted sutures. The wound was dressed with adaptic, dry sterile dressings and a bulky soft hand & wrist dressing was applied. Ms. Mario Pinto  was awakened from light sedation, having tolerated the procedure without apparent complication. She  was returned to the recovery room in stable condition. At the conclusion of the procedure all needle, instrument, and sponge counts were correct. Xiomara Ruiz MD   4/5/2022, 7:25 AM

## 2022-04-08 ENCOUNTER — TELEPHONE (OUTPATIENT)
Dept: ORTHOPEDIC SURGERY | Age: 27
End: 2022-04-08

## 2022-04-08 NOTE — TELEPHONE ENCOUNTER
General Question     Subject: Patient states she's having a strange pain in her r hand.   Patient: Rosana Parish  Contact Number:[phone] Yes

## 2022-04-08 NOTE — TELEPHONE ENCOUNTER
Spoke with the patient. She said when she tries to use her hand there is a pulling feeling. I told her that she could have a stitch that could be stuck in the gauze. She could also be swollen which is causing the pulling. I told her to keep her hand elevated to see if that helps as well. Patient understood and will call back if she needs anything else.

## 2022-04-12 ENCOUNTER — PATIENT MESSAGE (OUTPATIENT)
Dept: FAMILY MEDICINE CLINIC | Age: 27
End: 2022-04-12

## 2022-04-12 NOTE — TELEPHONE ENCOUNTER
From: Jeff Thompson  To: Dr. Mendez Silence: 4/12/2022 12:35 PM EDT  Subject: Birth Control    Good Afternoon-     I would like to go back on birth control. Is an appointment needed or can the prescription be refilled?

## 2022-04-13 ENCOUNTER — TELEMEDICINE (OUTPATIENT)
Dept: FAMILY MEDICINE CLINIC | Age: 27
End: 2022-04-13
Payer: COMMERCIAL

## 2022-04-13 DIAGNOSIS — Z30.8 ENCOUNTER FOR OTHER CONTRACEPTIVE MANAGEMENT: Primary | ICD-10-CM

## 2022-04-13 DIAGNOSIS — Z87.42 HISTORY OF PCOS: ICD-10-CM

## 2022-04-13 PROCEDURE — 99213 OFFICE O/P EST LOW 20 MIN: CPT | Performed by: FAMILY MEDICINE

## 2022-04-13 RX ORDER — DROSPIRENONE AND ETHINYL ESTRADIOL 0.03MG-3MG
KIT ORAL
Qty: 28 TABLET | Refills: 1 | Status: SHIPPED | OUTPATIENT
Start: 2022-04-13

## 2022-04-13 RX ORDER — DROSPIRENONE AND ETHINYL ESTRADIOL 0.03MG-3MG
KIT ORAL
Qty: 28 TABLET | Refills: 11 | Status: SHIPPED | OUTPATIENT
Start: 2022-04-13 | End: 2022-05-03 | Stop reason: SDUPTHER

## 2022-04-13 ASSESSMENT — PATIENT HEALTH QUESTIONNAIRE - PHQ9
5. POOR APPETITE OR OVEREATING: 0
SUM OF ALL RESPONSES TO PHQ QUESTIONS 1-9: 0
SUM OF ALL RESPONSES TO PHQ QUESTIONS 1-9: 0
1. LITTLE INTEREST OR PLEASURE IN DOING THINGS: 0
9. THOUGHTS THAT YOU WOULD BE BETTER OFF DEAD, OR OF HURTING YOURSELF: 0
6. FEELING BAD ABOUT YOURSELF - OR THAT YOU ARE A FAILURE OR HAVE LET YOURSELF OR YOUR FAMILY DOWN: 0
SUM OF ALL RESPONSES TO PHQ QUESTIONS 1-9: 0
SUM OF ALL RESPONSES TO PHQ9 QUESTIONS 1 & 2: 0
10. IF YOU CHECKED OFF ANY PROBLEMS, HOW DIFFICULT HAVE THESE PROBLEMS MADE IT FOR YOU TO DO YOUR WORK, TAKE CARE OF THINGS AT HOME, OR GET ALONG WITH OTHER PEOPLE: 0
SUM OF ALL RESPONSES TO PHQ QUESTIONS 1-9: 0
3. TROUBLE FALLING OR STAYING ASLEEP: 0
4. FEELING TIRED OR HAVING LITTLE ENERGY: 0
8. MOVING OR SPEAKING SO SLOWLY THAT OTHER PEOPLE COULD HAVE NOTICED. OR THE OPPOSITE, BEING SO FIGETY OR RESTLESS THAT YOU HAVE BEEN MOVING AROUND A LOT MORE THAN USUAL: 0
7. TROUBLE CONCENTRATING ON THINGS, SUCH AS READING THE NEWSPAPER OR WATCHING TELEVISION: 0
2. FEELING DOWN, DEPRESSED OR HOPELESS: 0

## 2022-04-13 NOTE — PROGRESS NOTES
TELEHEALTH EVALUATION -- Audio/Visual (During CUWQB-51 public health emergency)    HPI:  Marquis Araya (:  1995) is a 27 y.o. female,  here for evaluation of the following chief complaint(s):  Medication Refill (birth control)      ASSESSMENT/PLAN:   Diagnosis Orders   1. Encounter for other contraceptive management     2. History of PCOS  drospirenone-ethinyl estradiol (NAS) 3-0.03 MG TABS    drospirenone-ethinyl estradiol (NAS) 3-0.03 MG TABS     Jessica was seen today for medication refill. Diagnoses and all orders for this visit:    Encounter for other contraceptive management    History of PCOS  -     drospirenone-ethinyl estradiol (NAS) 3-0.03 MG TABS; TAKE ONE TABLET BY MOUTH DAILY    Sent to mail order and a local Rx to [facility] to get started. Discussed side effects and no smoking         SUBJECTIVE/OBJECTIVE:  HPI  Was on birth control last year and also used for PCOS, took nas. Tolerated it well. been off of it for 6-7 months. Jose Money Up to date on pap, hx of hpv, so she has been following up on that with her gyn. Had CTS surgery last , negative pregnancy test. Does have pregnancy test at home, will take it before re-starting her bcp.she understands. Review of Systems   As above  Allergic/Immunologic: Negative for immunocompromised state. Psychiatric/Behavioral: Negative for agitation, behavioral problems and confusion. Physical Exam    Constitutional: [x] Appears well-developed and well-nourished [x] No apparent distress      [] Abnormal-   Mental status  [x] Alert and awake  [x] Oriented to person/place/time [x]Able to follow commands      Eyes:  EOM    [x]  Normal  [] Abnormal-  Sclera  [x]  Normal  [] Abnormal -         Discharge [x]  None visible  [] Abnormal -    HENT:   [x] Normocephalic, atraumatic.   [] Abnormal   [] Mouth/Throat: Mucous membranes are moist.     External Ears [x] Normal  [] Abnormal-     Neck: [x] No visualized mass     Pulmonary/Chest: [x] Respiratory effort normal.  [x] No visualized signs of difficulty breathing or respiratory distress        [] Abnormal-    Able to speak in full sentences without difficulty  Musculoskeletal:   [] Normal gait with no signs of ataxia         [x] Normal range of motion of neck        [] Abnormal-       Neurological:        [x] No Facial Asymmetry (Cranial nerve 7 motor function) (limited exam to video visit)          [x] No gaze palsy        [] Abnormal-         Skin:        [x] No significant exanthematous lesions or discoloration noted on facial skin         [] Abnormal-            Psychiatric:       [x] Normal Affect [] No Hallucinations        [] Abnormal-   Judgment, behavior, thought and mood are normal.      Pap recommendations:  Age 21-29, pap (cytology) every 3 years. 30 min  Time spent today included for this patient visit includes time spent preparing to see the patient  Including review of tests, labs and imaging,   revewing previous history and recent encounters,   obtaining and/or reviewing separately obtained history in care everywhere or record,   performing a medically appropriate examination and/or evaluation;   counseling and educating the patient   ordering medications, tests, or procedures;   referring to other health care specialists if applicable;   documenting clinical information in the electronic health record;   independently interpreting results (not separately reported)   and communicating results to the patient. (During JZJRR-33 public health emergency), evaluation of the following organ systems was limited: Vitals/Constitutional/EENT/Resp/CV/GI//MS/Neuro/Skin/Heme-Lymph-Imm.   Pursuant to the emergency declaration under the [address] [place] [address] authority and the [employer] and [employer] Act, this Virtual Visit was conducted with patient's (and/or legal guardian's) consent, to reduce the patient's risk of exposure to COVID-19 and provide necessary medical care. The patient (and/or legal guardian) has also been advised to contact this office for worsening conditions or problems, and seek emergency medical treatment and/or call 911 if deemed necessary. Patient initiated the encounter and gave consent for the encounter. Services were provided through a video synchronous discussion virtually to substitute for in-person clinic visit. Mario Pinto, was evaluated through a synchronous (real-time) audio-video encounter. The patient (or guardian if applicable) is aware that this is a billable service, which includes applicable co-pays. This Virtual Visit was conducted with patient's (and/or legal guardian's) consent. The visit was conducted pursuant to the emergency declaration under the [address] [place] [address] waiver authority and the [employer] General Act. Patient identification was verified, and a caregiver was present when appropriate. The patient was located in a state where the provider was licensed to provide care.
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
## 2022-04-20 ENCOUNTER — OFFICE VISIT (OUTPATIENT)
Dept: ORTHOPEDIC SURGERY | Age: 27
End: 2022-04-20

## 2022-04-20 VITALS — RESPIRATION RATE: 16 BRPM | HEIGHT: 61 IN | BODY MASS INDEX: 35.87 KG/M2 | WEIGHT: 190 LBS

## 2022-04-20 DIAGNOSIS — G56.01 CARPAL TUNNEL SYNDROME OF RIGHT WRIST: Primary | ICD-10-CM

## 2022-04-20 PROCEDURE — 99024 POSTOP FOLLOW-UP VISIT: CPT | Performed by: ORTHOPAEDIC SURGERY

## 2022-04-20 NOTE — PATIENT INSTRUCTIONS
Postoperative Instructions After Carpal Tunnel Release    Dr. Monica Grider        1. After bandages are removed one week from surgery, you may chose to wear a small bandage over the incision if you wish, though you do not need to. 2. Keep incision dry until sutures have fully dissolved  or it has been 12 - 14 days since your surgery. Thereafter, you may wash with mild soap and water and shower normally. 3. Work hard on motion of the fingers and wrist, straightening each finger fully and bending each finger fully, bending wrist forward and bending wrist backwards. Do not be concerned if you experience discomfort. This will not damage the surgery. 4. You may begin using the hand as it feels comfortable beginning 12 - 14 days from the day of surgery. You may not feel entirely comfortable gripping or lifting heavy objects for several weeks. 5. You may expect to see some skin peel off around the incision. You may be left with a small area of pink baby skin. This is quite normal.  6. Once your stiches have fully disappeared & skin appears normal, you should begin gently massaging the incision with Vitamin E (may use Vitamin E lotion or contents of Vitamin E capsule). Thank you for choosing [facility]) Physicians for your Hand and Upper Extremity needs. If we can be of any further assistance to you, please do not hesitate to contact us.     Office Phone Number:  [phone]  or  [phone]

## 2022-04-20 NOTE — PROGRESS NOTES
Ms. Sivakumar Mcgarry returns today in follow-up of her recent right Carpal Tunnel Release done approximately 2 weeks ago. She has done well noting mild discomfort and no other reported complications. She notes pre-operative symptoms to be significantly improved at this time. Physical Exam:  Bandage intact and well cared for  Skin incision is healing well, without erythema, drainage or sign of infection. Digital range of motion is Full. Wrist range of motion is Full. Sensation is improved from preoperatvely  Vascular examination reveals normal, good capillary refill and good color. Swelling is minimal.  Sensory and Motor Median Nerve function is intact. Impression:  Ms. Sivakumar Mcgarry is doing well after recent right Carpal Tunnel Release. Plan:  Ms. Sivakumar Mcgarry is instructed in work on Active & Passive range of motion of the digits, wrist, & elbow. These modalities were specifically demonstrated to her today. We discussed the appropriateness of gradual resumption of use of the operated hand and the return to normal use as comfort allows. She is given instructions regarding management of the fresh surgical incision and progressive use of desensitization and tissue massage techniques. We discussed the appropriate expectations and timeline for symptom improvement. She is provided a written patient instruction sheet titled: Postoperative Instructions After Carpal Tunnel Release. I have asked Ms. Sivakumar Mcgarry to follow-up with me or contact me by telephone over the next 2-4 weeks if her symptoms have not fully resolved or if she has not regained full & painless return of function. She is also specifically instructed to return to the office or call for an appointment sooner if her symptoms are changing or worsening prior to that time.

## 2022-04-29 ENCOUNTER — PATIENT MESSAGE (OUTPATIENT)
Dept: FAMILY MEDICINE CLINIC | Age: 27
End: 2022-04-29

## 2022-04-29 DIAGNOSIS — Z87.42 HISTORY OF PCOS: ICD-10-CM

## 2022-04-29 NOTE — TELEPHONE ENCOUNTER
From: Jessica Allen  To: Dr. Narendra Cortes: 4/29/2022 2:38 PM EDT  Subject: Birth Control    [address] does not mail birth control. Can you send my remaining refills to the [facility] in [place] on [id number]?     Thanks,  Shmuel Bah

## 2022-05-03 RX ORDER — DROSPIRENONE AND ETHINYL ESTRADIOL 0.03MG-3MG
KIT ORAL
Qty: 28 TABLET | Refills: 11 | Status: SHIPPED | OUTPATIENT
Start: 2022-05-03 | End: 2022-08-24 | Stop reason: ALTCHOICE

## 2022-05-10 DIAGNOSIS — E06.3 HASHIMOTO'S THYROIDITIS: ICD-10-CM

## 2022-05-10 DIAGNOSIS — E55.9 VITAMIN D INSUFFICIENCY: ICD-10-CM

## 2022-05-10 DIAGNOSIS — E03.9 ACQUIRED HYPOTHYROIDISM: ICD-10-CM

## 2022-05-10 DIAGNOSIS — E28.2 PCOS (POLYCYSTIC OVARIAN SYNDROME): ICD-10-CM

## 2022-05-10 LAB
A/G RATIO: 1.7 (ref 1.1–2.2)
ALBUMIN SERPL-MCNC: 4.3 G/DL (ref 3.4–5)
ALP BLD-CCNC: 82 U/L (ref 40–129)
ALT SERPL-CCNC: 11 U/L (ref 10–40)
ANION GAP SERPL CALCULATED.3IONS-SCNC: 18 MMOL/L (ref 3–16)
AST SERPL-CCNC: 15 U/L (ref 15–37)
BILIRUB SERPL-MCNC: <0.2 MG/DL (ref 0–1)
BUN BLDV-MCNC: 7 MG/DL (ref 7–20)
CALCIUM SERPL-MCNC: 9.1 MG/DL (ref 8.3–10.6)
CHLORIDE BLD-SCNC: 102 MMOL/L (ref 99–110)
CO2: 20 MMOL/L (ref 21–32)
CREAT SERPL-MCNC: 0.7 MG/DL (ref 0.6–1.1)
GFR AFRICAN AMERICAN: >60
GFR NON-AFRICAN AMERICAN: >60
GLUCOSE BLD-MCNC: 88 MG/DL (ref 70–99)
POTASSIUM SERPL-SCNC: 4 MMOL/L (ref 3.5–5.1)
SODIUM BLD-SCNC: 140 MMOL/L (ref 136–145)
T4 FREE: 1.3 NG/DL (ref 0.9–1.8)
TOTAL PROTEIN: 6.9 G/DL (ref 6.4–8.2)
TSH SERPL DL<=0.05 MIU/L-ACNC: 2.53 UIU/ML (ref 0.27–4.2)
VITAMIN D 25-HYDROXY: 25.9 NG/ML

## 2022-05-11 ENCOUNTER — OFFICE VISIT (OUTPATIENT)
Dept: ENDOCRINOLOGY | Age: 27
End: 2022-05-11
Payer: COMMERCIAL

## 2022-05-11 VITALS
BODY MASS INDEX: 35.5 KG/M2 | RESPIRATION RATE: 14 BRPM | SYSTOLIC BLOOD PRESSURE: 110 MMHG | TEMPERATURE: 98 F | HEART RATE: 83 BPM | WEIGHT: 188 LBS | OXYGEN SATURATION: 98 % | DIASTOLIC BLOOD PRESSURE: 75 MMHG | HEIGHT: 61 IN

## 2022-05-11 DIAGNOSIS — E28.2 PCOS (POLYCYSTIC OVARIAN SYNDROME): ICD-10-CM

## 2022-05-11 DIAGNOSIS — E03.9 ACQUIRED HYPOTHYROIDISM: Primary | ICD-10-CM

## 2022-05-11 DIAGNOSIS — E03.9 ACQUIRED HYPOTHYROIDISM: ICD-10-CM

## 2022-05-11 DIAGNOSIS — E06.3 HASHIMOTO'S THYROIDITIS: ICD-10-CM

## 2022-05-11 DIAGNOSIS — E55.9 VITAMIN D INSUFFICIENCY: ICD-10-CM

## 2022-05-11 DIAGNOSIS — E66.9 CLASS 2 OBESITY WITH BODY MASS INDEX (BMI) OF 35.0 TO 35.9 IN ADULT, UNSPECIFIED OBESITY TYPE, UNSPECIFIED WHETHER SERIOUS COMORBIDITY PRESENT: ICD-10-CM

## 2022-05-11 PROCEDURE — 99214 OFFICE O/P EST MOD 30 MIN: CPT | Performed by: INTERNAL MEDICINE

## 2022-05-11 RX ORDER — LEVOTHYROXINE SODIUM 0.07 MG/1
TABLET ORAL
Qty: 90 TABLET | Refills: 1 | OUTPATIENT
Start: 2022-05-11

## 2022-05-11 RX ORDER — ERGOCALCIFEROL 1.25 MG/1
50000 CAPSULE ORAL WEEKLY
Qty: 12 CAPSULE | Refills: 1 | Status: SHIPPED | OUTPATIENT
Start: 2022-05-11

## 2022-05-11 RX ORDER — LEVOTHYROXINE SODIUM 88 UG/1
88 TABLET ORAL DAILY
Qty: 90 TABLET | Refills: 1 | Status: SHIPPED | OUTPATIENT
Start: 2022-05-11 | End: 2022-10-16

## 2022-05-11 NOTE — PROGRESS NOTES
SUBJECTIVE:  Ragini Lazaro is a 27 y.o. female who is being evaluated for hypothyroidism. 1. Acquired hypothyroidism  This started in 2009. Patient was diagnosed with hypothyroidism. The problem has been unchanged. Previous thyroid studies include: TSH and free thyroxine. Patient started medication in 2013. Currently patient is on: levothyroxine. Misses  0 doses a month. Current complaints: hypersomnia, fatigue, anxiety    Past medical history of hypothyroidism, PCOS, obesity, vitamin D, hypersomnia    2. PCOS (polycystic ovarian syndrome)  Never used Metformin   On birth control  Could not tolerate Aldactone, did not take for a long time  Has hair on the face  Shaves face, BCP helps some  On BCP no period  Off BCP irregular periods  3 yo started periods    3. Vitamin D insufficiency  Has fatigue    4. Hashimoto's thyroiditis  History of obstructive symptoms: difficulty swallowing No, changes in voice/hoarseness No.  History of radiation to patient's neck: No  Resent iodine exposure: No  Family history includes hyperthyroidism, hypothyroidism, Hashmotos, graves  Family history of thyroid cancer: No    5. Obesity  Eats healthier, active       EXAMINATION:   THYROID ULTRASOUND       10/13/2020       COMPARISON:   None.       HISTORY:   ORDERING SYSTEM PROVIDED HISTORY: Hashimoto's thyroiditis   TECHNOLOGIST PROVIDED HISTORY:   Reason for exam:->neck pressure.  H/o hashimoto's thyroiditis       Patient on thyroid meds for 8 or 9 years.       FINDINGS:   Right thyroid lobe:  4.0 x 1.6 x 1.5 cm       Left thyroid lobe:  3.9 x 1.1 x 1.1 cm       Isthmus:  2.9 mm.       Thyroid Gland:  Thyroid gland demonstrates homogeneous echotexture and normal   vascularity.       Nodules: No thyroid nodules are present.       Cervical lymphadenopathy: No abnormal lymph nodes in the imaged portions of   the neck.           Impression   Normal sonographic appearance of the thyroid.  No thyroid nodule or finding   of active thyroiditis.             Past Medical History:   Diagnosis Date    Allergic rhinitis     Asthma     Breast cyst     Reports a cyst in right breast as a teenager- not sure of breast imaging     Dizziness     Hashimoto's thyroiditis     Headache     HPV (human papilloma virus) infection     Hypersomnia     PCOS (polycystic ovarian syndrome)      Patient Active Problem List    Diagnosis Date Noted    Carpal tunnel syndrome     Carpal tunnel syndrome, bilateral 2022    PCOS (polycystic ovarian syndrome)     Acquired hypothyroidism     Class 2 obesity with body mass index (BMI) of 35.0 to 35.9 in adult     Family history of breast cancer 2021    Hashimoto's thyroiditis 2020    Vitamin D insufficiency 2020    Pain in both knees 2020    Asthma in adult 2020    Persistent depressive disorder 2020     Past Surgical History:   Procedure Laterality Date    BREAST ENHANCEMENT SURGERY      CARPAL TUNNEL RELEASE Left 3/22/2022    LEFT CARPAL TUNNEL RELEASE performed by Gregory Fernando MD at [facility] Right 2022    RIGHT CARPAL TUNNEL RELEASE performed by Gregory Fernando MD at [facility]      WISDOM TOOTH EXTRACTION       Family History   Problem Relation Age of Onset    Breast Cancer Mother     Diabetes Father     Breast Cancer Maternal Aunt     Breast Cancer Paternal Aunt     Cancer Maternal Grandmother      Social History     Socioeconomic History    Marital status: Single     Spouse name: None    Number of children: None    Years of education: None    Highest education level: None   Occupational History    None   Tobacco Use    Smoking status: Former Smoker     Quit date: 3/14/2021     Years since quittin.1    Smokeless tobacco: Never Used   Vaping Use    Vaping Use: Never used   Substance and Sexual Activity    Alcohol use: Yes     Comment: 2-3 drinks per week  Drug use: Not Currently    Sexual activity: Yes     Partners: Male   Other Topics Concern    None   Social History Narrative    None     Social Determinants of Health     Financial Resource Strain: Low Risk     Difficulty of Paying Living Expenses: Not hard at all   Food Insecurity: No Food Insecurity    Worried About Running Out of Food in the Last Year: Never true    Tomer of Food in the Last Year: Never true   Transportation Needs:     Lack of Transportation (Medical): Not on file    Lack of Transportation (Non-Medical):  Not on file   Physical Activity:     Days of Exercise per Week: Not on file    Minutes of Exercise per Session: Not on file   Stress:     Feeling of Stress : Not on file   Social Connections:     Frequency of Communication with Friends and Family: Not on file    Frequency of Social Gatherings with Friends and Family: Not on file    Attends Muslim Services: Not on file    Active Member of [address] [place] [address] [employer] or Organizations: Not on file    Attends Club or Organization Meetings: Not on file    Marital Status: Not on file   Intimate Partner Violence:     Fear of Current or Ex-Partner: Not on file    Emotionally Abused: Not on file    Physically Abused: Not on file    Sexually Abused: Not on file   Housing Stability:     Unable to Pay for Housing in the Last Year: Not on file    Number of [place] in the Last Year: Not on file    Unstable Housing in the Last Year: Not on file     Current Outpatient Medications   Medication Sig Dispense Refill    levothyroxine (SYNTHROID) 88 MCG tablet Take 1 tablet by mouth Daily 90 tablet 1    vitamin D (ERGOCALCIFEROL) 1.25 MG (48675 UT) CAPS capsule Take 1 capsule by mouth once a week 12 capsule 1    drospirenone-ethinyl estradiol (SALLIE) 3-0.03 MG TABS TAKE ONE TABLET BY MOUTH DAILY 28 tablet 1    albuterol sulfate HFA (VENTOLIN HFA) 108 (90 Base) MCG/ACT inhaler Inhale 2 puffs into the lungs 4 times daily as needed for Wheezing 1 each 5    buPROPion (WELLBUTRIN XL) 150 MG extended release tablet Take 150 mg by mouth daily      Sertraline HCl 150 MG CAPS Take 150 mg by mouth daily       fluticasone (FLONASE) 50 MCG/ACT nasal spray 1 spray by Each Nostril route 2 times daily (Patient taking differently: 1 spray by Each Nostril route as needed ) 1 Bottle 2    SUNOSI 150 MG TABS daily       methylphenidate (RITALIN LA) 20 MG extended release capsule Take 20 mg by mouth 2 times daily.  methylphenidate (RITALIN) 10 MG tablet Take 10 mg by mouth as needed. In addition to 20 mg bid      drospirenone-ethinyl estradiol (SALLIE) 3-0.03 MG TABS TAKE ONE TABLET BY MOUTH DAILY (Patient not taking: Reported on 5/11/2022) 28 tablet 11    diclofenac sodium (VOLTAREN) 1 % GEL Apply 2 g topically 4 times daily as needed for Pain (Patient not taking: Reported on 4/13/2022) 60 g 1    metFORMIN (GLUCOPHAGE) 500 MG tablet Take 1 tablet by mouth 2 times daily (with meals) (Patient not taking: Reported on 5/11/2022) 60 tablet 3     No current facility-administered medications for this visit.      Allergies   Allergen Reactions    Latex Rash     Family Status   Relation Name Status    Mother  Alive    Father  Alive    [address]  (Not Specified)    PAunt  (Not Specified)    MGM  (Not Specified)       Review of Systems:  Constitutional: has fatigue, no fever, has recent weight gain, no recent weight loss, no changes in appetite  Eyes: no eye pain, no change in vision, no eye redness, no eye irritation, no double vision  Ears, nose, throat: has nasal congestion, no sore throat, no earache, no decrease in hearing, no hoarseness, no dry mouth, has sinus problems, no difficulty swallowing, no neck lumps, no dental problems, no mouth sores, no ringing in ears  Pulmonary: no shortness of breath, no wheezing, no dyspnea on exertion, no cough  Cardiovascular: no chest pain, no lower extremity edema, no orthopnea, no intermittent leg claudication, no palpitations  Gastrointestinal: no abdominal pain, no nausea, no vomiting, no diarrhea, no constipation, no dysphagia, no heartburn, no bloating  Genitourinary: no dysuria, no urinary incontinence, no urinary hesitancy, no urinary frequency, no feelings of urinary urgency, no nocturia  Musculoskeletal: no joint swelling, no joint stiffness, has joint pain, no muscle cramps, no muscle pain, no bone pain  Integument/Breast: no hair loss, no skin rashes, no skin lesions, no itching, has dry skin  Neurological: no numbness, no tingling, no weakness, no confusion, nhas headaches, no dizziness, no fainting, no tremors, no decrease in memory, no balance problems  Psychiatric: has anxiety, has depression, no insomnia  Hematologic/Lymphatic: no tendency for easy bleeding, no swollen lymph nodes, no tendency for easy bruising  Immunology: has seasonal allergies, no frequent infections, no frequent illnesses  Endocrine: has temperature intolerance    /75   Pulse 83   Temp 98 °F (36.7 °C)   Resp 14   Ht 5' 1\" (1.549 m)   Wt 188 lb (85.3 kg)   SpO2 98%   BMI 35.52 kg/m²    Wt Readings from Last 3 Encounters:   05/11/22 188 lb (85.3 kg)   04/20/22 190 lb (86.2 kg)   03/30/22 190 lb (86.2 kg)     Body mass index is 35.52 kg/m².     OBJECTIVE:  Constitutional: no acute distress, well appearing and well nourished  Psychiatric: oriented to person, place and time, judgement and insight and normal, recent and remote memory and intact and mood and affect are normal  Skin: skin and subcutaneous tissue is normal without mass, normal turgor  Head and Face: examination of head and face revealed no abnormalities  Eyes: no lid or conjunctival swelling, erythema or discharge, pupils are normal, equal, round, reactive to light  Ears/Nose: external inspection of ears and nose revealed no abnormalities, hearing is grossly normal  Oropharynx/Mouth/Face: lips, tongue and gums are normal with no lesions, the voice quality was normal  Neck: neck is supple and symmetric, with midline trachea and no masses, thyroid is normal  Lymphatics: normal cervical lymph nodes, normal supraclavicular nodes  Pulmonary: no increased work of breathing or signs of respiratory distress, lungs are clear to auscultation  Cardiovascular: normal heart rate and rhythm, normal S1 and S2, no murmurs and pedal pulses and 2+ bilaterally, No edema  Abdomen: abdomen is soft, non-tender with no masses  Musculoskeletal: normal gait and station and exam of the digits and nails are normal  Neurological: normal coordination and normal general cortical function      Lab Review:    No results found for: WBC, HGB, HCT, MCV, PLT  Lab Results   Component Value Date     05/10/2022    K 4.0 05/10/2022     05/10/2022    CO2 20 05/10/2022    BUN 7 05/10/2022    CREATININE 0.7 05/10/2022    GLUCOSE 88 05/10/2022    CALCIUM 9.1 05/10/2022    PROT 6.9 05/10/2022    LABALBU 4.3 05/10/2022    BILITOT <0.2 05/10/2022    ALKPHOS 82 05/10/2022    AST 15 05/10/2022    ALT 11 05/10/2022    LABGLOM >60 05/10/2022    GFRAA >60 05/10/2022    AGRATIO 1.7 05/10/2022    GLOB 2.8 09/28/2021     Lab Results   Component Value Date    TSHFT4 0.86 08/20/2020    TSH 2.53 05/10/2022    FT3 3.3 01/25/2022     No results found for: LABA1C  No results found for: EAG  Lab Results   Component Value Date    CHOL 201 08/24/2021     Lab Results   Component Value Date    TRIG 131 08/24/2021     Lab Results   Component Value Date    HDL 64 08/24/2021     Lab Results   Component Value Date    LDLCALC 111 08/24/2021     No results found for: LABVLDL, VLDL  No results found for: CHOLHDLRATIO  No results found for: Tatyana Danika  Lab Results   Component Value Date    VITD25 25.9 05/10/2022        ASSESSMENT/PLAN:    1. Acquired hypothyroidism  Still very tired. TSH 0.87-1.95-2.53  Had carpal tunnel sugery.   Increase levothyroxine (SYNTHROID) to 0.088 mg daily  - T3, Free; Future  - T4, Free; Future  - TSH without Reflex; Future    2. PCOS (polycystic ovarian syndrome)  Call for results of testosterone, DHEAS  1 mg overnight dexamethasone suppression test-cortisol less than 0.8, appropriate suppression  Prolactin 14.6  ACTH 19  - Comprehensive Metabolic Panel; Future  - DHEA-Sulfate; Future  - Testosterone, free, total; Future  - Insulin, total; Future    3. Vitamin D insufficiency  Vitamin D 50,000 IU weekly  25 hydroxy vitamin D 21.9-20.0-25.9  - Vitamin D 25 Hydroxy; Future    4. Hashimoto's thyroiditis  - T3, Free; Future  - T4, Free; Future  - TSH without Reflex; Future    5. Obesity  Diet, exercise    Reviewed and/or ordered clinical lab results Yes  Reviewed and/or ordered radiology tests Yes   Reviewed and/or ordered other diagnostic tests No  Discussed test results with performing physician No  Independently reviewed image, tracing, or specimen No  Made a decision to obtain old records No  Reviewed and summarized old records Yes   Hypothyroidism  Hashimoto's thyroiditis  TSH 2.86  Levothyroxine 0.075 mg  PCOS  Obtained history from other than patient No    Sivakumar Zeferino was counseled regarding symptoms of thyroid, PCOS diagnosis, course and complications of disease if inadequately treated, side effects of medications, diagnosis, treatment options, and prognosis, risks, benefits, complications, and alternatives of treatment, labs, imaging and other studies and treatment targets and goals. She understands instructions and counseling. Return in about 3 months (around 8/11/2022) for thyroid problems.     Electronically signed by Timmy Dwyer MD on 5/11/2022 at 4:27 PM

## 2022-05-12 LAB
SEX HORMONE BINDING GLOBULIN: 220 NMOL/L (ref 30–135)
TESTOSTERONE FREE-NONMALE: ABNORMAL PG/ML (ref 0.8–7.4)
TESTOSTERONE TOTAL: 36 NG/DL (ref 20–70)

## 2022-05-13 LAB
DHEAS (DHEA SULFATE): 164 UG/DL (ref 65–380)
T3 FREE: 2.9 PG/ML (ref 2.3–4.2)

## 2022-05-31 LAB
CHOLESTEROL, TOTAL: 209 MG/DL (ref 0–199)
GLUCOSE BLD-MCNC: 84 MG/DL (ref 70–99)
HDLC SERPL-MCNC: 64 MG/DL (ref 40–60)
LDL CHOLESTEROL CALCULATED: 110 MG/DL
TRIGL SERPL-MCNC: 173 MG/DL (ref 0–150)

## 2022-07-11 ENCOUNTER — PATIENT MESSAGE (OUTPATIENT)
Dept: FAMILY MEDICINE CLINIC | Age: 27
End: 2022-07-11

## 2022-07-11 DIAGNOSIS — L85.8 KERATOSIS PILARIS: Primary | ICD-10-CM

## 2022-07-11 NOTE — TELEPHONE ENCOUNTER
From: Tasha Belcher  To: Dr. Guanakito Palencia: 7/11/2022 11:04 AM EDT  Subject: Dermatologist     Hello-    Do I need a referral to see a Dermatologist?

## 2022-08-02 ENCOUNTER — OFFICE VISIT (OUTPATIENT)
Dept: FAMILY MEDICINE CLINIC | Age: 27
End: 2022-08-02
Payer: COMMERCIAL

## 2022-08-02 VITALS
OXYGEN SATURATION: 99 % | DIASTOLIC BLOOD PRESSURE: 82 MMHG | BODY MASS INDEX: 35.52 KG/M2 | HEART RATE: 94 BPM | RESPIRATION RATE: 18 BRPM | TEMPERATURE: 97.1 F | WEIGHT: 188 LBS | SYSTOLIC BLOOD PRESSURE: 116 MMHG

## 2022-08-02 DIAGNOSIS — R21 RASH: Primary | ICD-10-CM

## 2022-08-02 PROCEDURE — 99213 OFFICE O/P EST LOW 20 MIN: CPT | Performed by: NURSE PRACTITIONER

## 2022-08-02 RX ORDER — DIAZEPAM 2 MG/1
TABLET ORAL
COMMUNITY
Start: 2022-07-08

## 2022-08-02 RX ORDER — TRIAMCINOLONE ACETONIDE 1 MG/G
CREAM TOPICAL
Qty: 45 G | Refills: 0 | Status: SHIPPED | OUTPATIENT
Start: 2022-08-02 | End: 2022-08-24

## 2022-08-02 ASSESSMENT — PATIENT HEALTH QUESTIONNAIRE - PHQ9
5. POOR APPETITE OR OVEREATING: 0
9. THOUGHTS THAT YOU WOULD BE BETTER OFF DEAD, OR OF HURTING YOURSELF: 0
8. MOVING OR SPEAKING SO SLOWLY THAT OTHER PEOPLE COULD HAVE NOTICED. OR THE OPPOSITE, BEING SO FIGETY OR RESTLESS THAT YOU HAVE BEEN MOVING AROUND A LOT MORE THAN USUAL: 0
SUM OF ALL RESPONSES TO PHQ QUESTIONS 1-9: 0
SUM OF ALL RESPONSES TO PHQ QUESTIONS 1-9: 0
10. IF YOU CHECKED OFF ANY PROBLEMS, HOW DIFFICULT HAVE THESE PROBLEMS MADE IT FOR YOU TO DO YOUR WORK, TAKE CARE OF THINGS AT HOME, OR GET ALONG WITH OTHER PEOPLE: 0
SUM OF ALL RESPONSES TO PHQ9 QUESTIONS 1 & 2: 0
7. TROUBLE CONCENTRATING ON THINGS, SUCH AS READING THE NEWSPAPER OR WATCHING TELEVISION: 0
6. FEELING BAD ABOUT YOURSELF - OR THAT YOU ARE A FAILURE OR HAVE LET YOURSELF OR YOUR FAMILY DOWN: 0
SUM OF ALL RESPONSES TO PHQ QUESTIONS 1-9: 0
1. LITTLE INTEREST OR PLEASURE IN DOING THINGS: 0
2. FEELING DOWN, DEPRESSED OR HOPELESS: 0
SUM OF ALL RESPONSES TO PHQ QUESTIONS 1-9: 0
3. TROUBLE FALLING OR STAYING ASLEEP: 0
4. FEELING TIRED OR HAVING LITTLE ENERGY: 0
DEPRESSION UNABLE TO ASSESS: FUNCTIONAL CAPACITY MOTIVATION LIMITS ACCURACY

## 2022-08-02 ASSESSMENT — ANXIETY QUESTIONNAIRES
6. BECOMING EASILY ANNOYED OR IRRITABLE: 0
3. WORRYING TOO MUCH ABOUT DIFFERENT THINGS: 0
5. BEING SO RESTLESS THAT IT IS HARD TO SIT STILL: 0
4. TROUBLE RELAXING: 0
7. FEELING AFRAID AS IF SOMETHING AWFUL MIGHT HAPPEN: 0
2. NOT BEING ABLE TO STOP OR CONTROL WORRYING: 0
IF YOU CHECKED OFF ANY PROBLEMS ON THIS QUESTIONNAIRE, HOW DIFFICULT HAVE THESE PROBLEMS MADE IT FOR YOU TO DO YOUR WORK, TAKE CARE OF THINGS AT HOME, OR GET ALONG WITH OTHER PEOPLE: NOT DIFFICULT AT ALL
1. FEELING NERVOUS, ANXIOUS, OR ON EDGE: 0
GAD7 TOTAL SCORE: 0

## 2022-08-02 ASSESSMENT — ENCOUNTER SYMPTOMS
COUGH: 0
VOMITING: 0
NAUSEA: 0
SHORTNESS OF BREATH: 0
DIARRHEA: 0

## 2022-08-02 NOTE — PATIENT INSTRUCTIONS
Start the steroid cream twice daily for up to 14 days for the itchy rash on the ankle  Follow up as needed

## 2022-08-02 NOTE — PROGRESS NOTES
2022     Chief Complaint   Patient presents with    Rash     Rash on legs for about a month      Cynthia Mcginnis (:  1995) is a 27 y.o. female, here for evaluation of the following medical concerns:    HPI    Pruritic rash left ankle x 1 month, no pain. Was red and now is darker. No swelling. Has not tried any treatment at home. Had another area develop on the right calf which was red and painful but that resolved. Feels well otherwise, denies fever/myalgia/arthralgia      Review of Systems   Constitutional:  Negative for chills, fatigue and fever. Respiratory:  Negative for cough and shortness of breath. Cardiovascular:  Negative for chest pain and leg swelling. Gastrointestinal:  Negative for diarrhea, nausea and vomiting. Skin:  Positive for rash. Neurological:  Negative for dizziness and headaches. All other systems reviewed and are negative. Prior to Visit Medications    Medication Sig Taking?  Authorizing Provider   diazePAM (VALIUM) 2 MG tablet  Yes Historical Provider, MD   triamcinolone (KENALOG) 0.1 % cream Apply topically 2 times daily x 14 days Yes CECY Tirado - CNP   levothyroxine (SYNTHROID) 88 MCG tablet Take 1 tablet by mouth Daily Yes Cresencio Ormond, MD   vitamin D (ERGOCALCIFEROL) 1.25 MG (43202 UT) CAPS capsule Take 1 capsule by mouth once a week Yes Cresencio Ormond, MD   drospirenone-ethinyl estradiol (SALLIE) 3-0.03 MG TABS TAKE ONE TABLET BY MOUTH DAILY Yes Eda Glass, DO   drospirenone-ethinyl estradiol (SALLIE) 3-0.03 MG TABS TAKE ONE TABLET BY MOUTH DAILY Yes Eda Glass, DO   albuterol sulfate HFA (VENTOLIN HFA) 108 (90 Base) MCG/ACT inhaler Inhale 2 puffs into the lungs 4 times daily as needed for Wheezing Yes Eda Glass, DO   buPROPion (WELLBUTRIN XL) 150 MG extended release tablet Take 150 mg by mouth daily Yes Historical Provider, MD   diclofenac sodium (VOLTAREN) 1 % GEL Apply 2 g topically 4 times daily as needed for Pain Yes Eda Jimenez, DO   Sertraline HCl 150 MG CAPS Take 150 mg by mouth daily  Yes Historical Provider, MD   fluticasone (FLONASE) 50 MCG/ACT nasal spray 1 spray by Each Nostril route 2 times daily  Patient taking differently: 1 spray by Each Nostril route as needed Yes Renee Balderrama MD   SUNOSI 150 MG TABS daily  Yes Historical Provider, MD   methylphenidate (RITALIN LA) 20 MG extended release capsule Take 20 mg by mouth 2 times daily. Yes Historical Provider, MD   methylphenidate (RITALIN) 10 MG tablet Take 10 mg by mouth as needed. In addition to 20 mg bid Yes Historical Provider, MD        Social History     Tobacco Use    Smoking status: Former     Types: Cigarettes     Quit date: 3/14/2021     Years since quittin.3    Smokeless tobacco: Never   Substance Use Topics    Alcohol use: Yes     Comment: 2-3 drinks per week        Vitals:    22 1505   BP: 116/82   Site: Left Upper Arm   Position: Sitting   Pulse: 94   Resp: 18   Temp: 97.1 °F (36.2 °C)   TempSrc: Temporal   SpO2: 99%   Weight: 188 lb (85.3 kg)     Estimated body mass index is 35.52 kg/m² as calculated from the following:    Height as of 22: 5' 1\" (1.549 m). Weight as of this encounter: 188 lb (85.3 kg). Physical Exam  Vitals and nursing note reviewed. Constitutional:       General: She is not in acute distress. Appearance: Normal appearance. She is well-developed. She is obese. She is not ill-appearing, toxic-appearing or diaphoretic. HENT:      Head: Normocephalic and atraumatic. Eyes:      Extraocular Movements: Extraocular movements intact. Pupils: Pupils are equal, round, and reactive to light. Cardiovascular:      Rate and Rhythm: Normal rate and regular rhythm. Heart sounds: Normal heart sounds, S1 normal and S2 normal. No murmur heard. No friction rub. No gallop. Pulmonary:      Effort: Pulmonary effort is normal. No respiratory distress. Breath sounds: Normal breath sounds.    Skin:     Findings: Rash present. Rash is macular and papular. Neurological:      General: No focal deficit present. Mental Status: She is alert and oriented to person, place, and time. Mental status is at baseline. Cranial Nerves: No cranial nerve deficit. Psychiatric:         Speech: Speech normal.       ASSESSMENT/PLAN:  1. Rash  - triamcinolone (KENALOG) 0.1 % cream; Apply topically 2 times daily x 14 days  Dispense: 45 g; Refill: 0    Kenalog cream BID x 14 days  Follow up as needed        An electronic signature was used to authenticate this note.     --Jemima Wilkinson, CECY - CNP on 8/2/2022 at 3:48 PM

## 2022-08-19 ENCOUNTER — TELEMEDICINE (OUTPATIENT)
Dept: PRIMARY CARE CLINIC | Age: 27
End: 2022-08-19
Payer: COMMERCIAL

## 2022-08-19 DIAGNOSIS — N30.00 ACUTE CYSTITIS WITHOUT HEMATURIA: Primary | ICD-10-CM

## 2022-08-19 PROCEDURE — 99213 OFFICE O/P EST LOW 20 MIN: CPT | Performed by: NURSE PRACTITIONER

## 2022-08-19 RX ORDER — SULFAMETHOXAZOLE AND TRIMETHOPRIM 800; 160 MG/1; MG/1
1 TABLET ORAL 2 TIMES DAILY
Qty: 6 TABLET | Refills: 0 | Status: SHIPPED | OUTPATIENT
Start: 2022-08-19 | End: 2022-08-24 | Stop reason: ALTCHOICE

## 2022-08-19 ASSESSMENT — ENCOUNTER SYMPTOMS
GASTROINTESTINAL NEGATIVE: 1
RESPIRATORY NEGATIVE: 1

## 2022-08-19 NOTE — PROGRESS NOTES
2022    TELEHEALTH EVALUATION -- Audio/Visual (During PJBTN-30 public health emergency)  Chief Complaint   Patient presents with    Urinary Tract Infection    Dysuria         HPI:    Radha Parker (:  1995) has requested an audio/video evaluation for the following concern(s):    Possible UTI. Dysuria when voids and frequency. Smells musty odor. Ingrown hair on inside labia does not know if this has anything to do with it. Has had UTI in the past. Same partner for 5 month and no issues. Denies fever/chills. No abd pain. Review of Systems   Constitutional:  Negative for chills, fatigue and fever. HENT: Negative. Respiratory: Negative. Cardiovascular: Negative. Gastrointestinal: Negative. Genitourinary:  Positive for dysuria and frequency. Negative for decreased urine volume, difficulty urinating, flank pain, hematuria, pelvic pain and urgency. Musculoskeletal: Negative. Skin: Negative. Neurological: Negative. Psychiatric/Behavioral: Negative. Prior to Visit Medications    Medication Sig Taking?  Authorizing Provider   sulfamethoxazole-trimethoprim (BACTRIM DS;SEPTRA DS) 800-160 MG per tablet Take 1 tablet by mouth 2 times daily for 3 days Yes CECY Montiel - CNP   diazePAM (VALIUM) 2 MG tablet   Historical Provider, MD   triamcinolone (KENALOG) 0.1 % cream Apply topically 2 times daily x 14 days  [place], APRN - CNP   levothyroxine (SYNTHROID) 88 MCG tablet Take 1 tablet by mouth Daily  Marco Hall MD   vitamin D (ERGOCALCIFEROL) 1.25 MG (19388 UT) CAPS capsule Take 1 capsule by mouth once a week  Marco Hall MD   drospirenone-ethinyl estradiol (SALLIE) 3-0.03 MG TABS TAKE ONE TABLET BY MOUTH DAILY  Molina Shahid DO   drospirenone-ethinyl estradiol (SALLIE) 3-0.03 MG TABS TAKE ONE TABLET BY MOUTH DAILY  Molina Shahid DO   albuterol sulfate HFA (VENTOLIN HFA) 108 (90 Base) MCG/ACT inhaler Inhale 2 puffs into the lungs 4 times daily as needed for [address], DO   buPROPion (WELLBUTRIN XL) 150 MG extended release tablet Take 150 mg by mouth daily  Historical Provider, MD   diclofenac sodium (VOLTAREN) 1 % GEL Apply 2 g topically 4 times daily as needed for Pain  Buren Finder, DO   Sertraline HCl 150 MG CAPS Take 150 mg by mouth daily   Historical Provider, MD   fluticasone (FLONASE) 50 MCG/ACT nasal spray 1 spray by Each Nostril route 2 times daily  Patient taking differently: 1 spray by Each Nostril route as needed  Edilma Jasso MD   SUNOSI 150 MG TABS daily   Historical Provider, MD   methylphenidate (RITALIN LA) 20 MG extended release capsule Take 20 mg by mouth 2 times daily. Historical Provider, MD   methylphenidate (RITALIN) 10 MG tablet Take 10 mg by mouth as needed.  In addition to 20 mg bid  Historical Provider, MD       Social History     Tobacco Use    Smoking status: Former     Types: Cigarettes     Quit date: 3/14/2021     Years since quittin.4    Smokeless tobacco: Never   Vaping Use    Vaping Use: Never used   Substance Use Topics    Alcohol use: Yes     Comment: 2-3 drinks per week    Drug use: Not Currently        Allergies   Allergen Reactions    Latex Rash   ,   Past Medical History:   Diagnosis Date    Allergic rhinitis     Asthma     Breast cyst     Reports a cyst in right breast as a teenager- not sure of breast imaging     Dizziness     Hashimoto's thyroiditis     Headache     HPV (human papilloma virus) infection     Hypersomnia     PCOS (polycystic ovarian syndrome)    ,   Past Surgical History:   Procedure Laterality Date    BREAST ENHANCEMENT SURGERY      CARPAL TUNNEL RELEASE Left 3/22/2022    LEFT CARPAL TUNNEL RELEASE performed by Pal Haddad MD at [address] [place] [address] Right 2022    RIGHT CARPAL TUNNEL RELEASE performed by Pal Haddad MD at [address]     ,   Social History     Tobacco Use    Smoking status: Former     Types: Cigarettes     Quit date: 3/14/2021     Years since quittin.4    Smokeless tobacco: Never   Vaping Use    Vaping Use: Never used   Substance Use Topics    Alcohol use: Yes     Comment: 2-3 drinks per week    Drug use: Not Currently   ,   Family History   Problem Relation Age of Onset    Breast Cancer Mother     Diabetes Father     Breast Cancer Maternal Aunt     Breast Cancer Paternal Aunt     Cancer Maternal Grandmother    ,   Immunization History   Administered Date(s) Administered    COVID-19, MODERNA BLUE border, Primary or Immunocompromised, (age 12y+), IM, 100 mcg/0.5mL 2021, 2021    DTaP vaccine 1993, 1993, 1994, 1995, 1998, 2006    HPV Quadrivalent (Gardasil) 2008, 10/20/2010, 2011, 2011    Hepatitis A Adult (Havrix, Vaqta) 03/15/2019    Hepatitis B Ped/Adol (Engerix-B, Recombivax HB) 2005, 2006    Hepatitis B vaccine 2008    Hib vaccine 1993, 1994, 1994, 1995    Influenza Virus Vaccine 10/20/2010, 10/05/2011, 10/19/2012, 10/03/2014, 2015, 2016, 03/15/2019, 2021    Influenza Whole 2008    Influenza, Intradermal, Preservative free 2013    MMR 1995, 2005    Polio Virus Vaccine 1993, 1994, 1995, 1998    Tdap (Boostrix, Adacel) 10/03/2014       PHYSICAL EXAMINATION:  [ INSTRUCTIONS:  \"[x]\" Indicates a positive item  \"[]\" Indicates a negative item  -- DELETE ALL ITEMS NOT EXAMINED]  Vital Signs: (As obtained by patient/caregiver or practitioner observation)    Blood pressure-  Heart rate-    Respiratory rate-    Temperature-  Pulse oximetry-     Constitutional: [x] Appears well-developed and well-nourished [x] No apparent distress      [] Abnormal-   Mental status  [x] Alert and awake  [x] Oriented to person/place/time [x]Able to follow commands      Eyes:  EOM    [x]  Normal  [] Abnormal-  Sclera  [x]  Normal [] Abnormal -         Discharge [x]  None visible  [] Abnormal -    HENT:   [x] Normocephalic, atraumatic. [] Abnormal   [] Mouth/Throat: Mucous membranes are moist.     External Ears [x] Normal  [] Abnormal-     Neck: [x] No visualized mass     Pulmonary/Chest: [x] Respiratory effort normal.  [x] No visualized signs of difficulty breathing or respiratory distress        [] Abnormal-      Musculoskeletal:   [] Normal gait with no signs of ataxia         [x] Normal range of motion of neck        [] Abnormal-       Neurological:        [x] No Facial Asymmetry (Cranial nerve 7 motor function) (limited exam to video visit)          [x] No gaze palsy        [] Abnormal-         Skin:        [x] No significant exanthematous lesions or discoloration noted on facial skin         [] Abnormal-            Psychiatric:       [x] Normal Affect [] No Hallucinations        [] Abnormal-     Other pertinent observable physical exam findings-     ASSESSMENT/PLAN:  1. Acute cystitis without hematuria  If no improvement/or worsening of condition, notify office for further follow up. Drink plenty of fluids. (Limit caffeine, spicy foods, and alcohol). Take medication as prescribed. You make drink cranberry juice. Take probiotics as directed. May take Tylenol for fever. Make sure to wipe from front to back. Practice good hand hygiene. Empty bladder as soon as you have the urge to do so. - sulfamethoxazole-trimethoprim (BACTRIM DS;SEPTRA DS) 800-160 MG per tablet; Take 1 tablet by mouth 2 times daily for 3 days  Dispense: 6 tablet; Refill: 0    Return with PCP if symptoms worsen or fail to improve. Leeanne Saleh, was evaluated through a synchronous (real-time) audio-video encounter. The patient (or guardian if applicable) is aware that this is a billable service, which includes applicable co-pays. This Virtual Visit was conducted with patient's (and/or legal guardian's) consent.  The visit was conducted pursuant to the emergency declaration under the [facility], [facility] authority and the [email or web address] and [employer] General Act. Patient identification was verified, and a caregiver was present when appropriate. The patient was located at Home: [address] [address]. Provider was located at Other: [place] . Total time spent on this encounter:  20 minutes    --CECY Betancourt CNP on 8/19/2022 at 7:16 PM    An electronic signature was used to authenticate this note.

## 2022-08-19 NOTE — LETTER
I had the pleasure of seeing Carola Ortiz today for a primary care virtualist video visit secondary to UTI. I have provided the following recommendations: Please see note. I have included my note for your review and have asked the patient to follow up with you if no improvement or worsening of symptoms. If you have questions, please reach out via [employer] secure messaging by searching for the group [id number]  Primary Care Virtualists. Your communication will be answered promptly by the Virtualist on service for the day. Additionally, we would love your overall feedback on this visit. Please hit shift and click the following link to let us know if the Virtualist service met your expectations. [employer] [id number]      Electronically signed by CECY Montiel CNP on 8/19/22 at 7:15 PM EDT.

## 2022-08-24 ENCOUNTER — OFFICE VISIT (OUTPATIENT)
Dept: FAMILY MEDICINE CLINIC | Age: 27
End: 2022-08-24
Payer: COMMERCIAL

## 2022-08-24 VITALS
HEART RATE: 101 BPM | DIASTOLIC BLOOD PRESSURE: 72 MMHG | RESPIRATION RATE: 16 BRPM | BODY MASS INDEX: 36.66 KG/M2 | TEMPERATURE: 97.6 F | SYSTOLIC BLOOD PRESSURE: 118 MMHG | WEIGHT: 194 LBS | OXYGEN SATURATION: 98 %

## 2022-08-24 DIAGNOSIS — N39.0 URINARY TRACT INFECTION WITHOUT HEMATURIA, SITE UNSPECIFIED: Primary | ICD-10-CM

## 2022-08-24 DIAGNOSIS — R10.30 LOWER ABDOMINAL PAIN: ICD-10-CM

## 2022-08-24 DIAGNOSIS — R82.998 LEUKOCYTES IN URINE: ICD-10-CM

## 2022-08-24 LAB
BILIRUBIN, POC: NEGATIVE
BLOOD URINE, POC: NEGATIVE
CLARITY, POC: NORMAL
COLOR, POC: NORMAL
GLUCOSE URINE, POC: NEGATIVE
KETONES, POC: NEGATIVE
LEUKOCYTE EST, POC: NORMAL
NITRITE, POC: NEGATIVE
PH, POC: 6.5
PROTEIN, POC: NEGATIVE
SPECIFIC GRAVITY, POC: 1.01
UROBILINOGEN, POC: NEGATIVE

## 2022-08-24 PROCEDURE — 81003 URINALYSIS AUTO W/O SCOPE: CPT | Performed by: NURSE PRACTITIONER

## 2022-08-24 PROCEDURE — 99213 OFFICE O/P EST LOW 20 MIN: CPT | Performed by: NURSE PRACTITIONER

## 2022-08-24 RX ORDER — NITROFURANTOIN 25; 75 MG/1; MG/1
100 CAPSULE ORAL 2 TIMES DAILY
Qty: 20 CAPSULE | Refills: 0 | Status: SHIPPED | OUTPATIENT
Start: 2022-08-24 | End: 2022-09-03

## 2022-08-24 SDOH — ECONOMIC STABILITY: HOUSING INSECURITY
IN THE LAST 12 MONTHS, WAS THERE A TIME WHEN YOU DID NOT HAVE A STEADY PLACE TO SLEEP OR SLEPT IN A SHELTER (INCLUDING NOW)?: NO

## 2022-08-24 SDOH — ECONOMIC STABILITY: TRANSPORTATION INSECURITY
IN THE PAST 12 MONTHS, HAS LACK OF TRANSPORTATION KEPT YOU FROM MEETINGS, WORK, OR FROM GETTING THINGS NEEDED FOR DAILY LIVING?: NO

## 2022-08-24 SDOH — ECONOMIC STABILITY: INCOME INSECURITY: IN THE LAST 12 MONTHS, WAS THERE A TIME WHEN YOU WERE NOT ABLE TO PAY THE MORTGAGE OR RENT ON TIME?: NO

## 2022-08-24 SDOH — ECONOMIC STABILITY: TRANSPORTATION INSECURITY
IN THE PAST 12 MONTHS, HAS THE LACK OF TRANSPORTATION KEPT YOU FROM MEDICAL APPOINTMENTS OR FROM GETTING MEDICATIONS?: NO

## 2022-08-24 SDOH — ECONOMIC STABILITY: FOOD INSECURITY: WITHIN THE PAST 12 MONTHS, THE FOOD YOU BOUGHT JUST DIDN'T LAST AND YOU DIDN'T HAVE MONEY TO GET MORE.: NEVER TRUE

## 2022-08-24 SDOH — ECONOMIC STABILITY: FOOD INSECURITY: WITHIN THE PAST 12 MONTHS, YOU WORRIED THAT YOUR FOOD WOULD RUN OUT BEFORE YOU GOT MONEY TO BUY MORE.: NEVER TRUE

## 2022-08-24 ASSESSMENT — ENCOUNTER SYMPTOMS
RESPIRATORY NEGATIVE: 1
SHORTNESS OF BREATH: 0

## 2022-08-24 ASSESSMENT — SOCIAL DETERMINANTS OF HEALTH (SDOH): HOW HARD IS IT FOR YOU TO PAY FOR THE VERY BASICS LIKE FOOD, HOUSING, MEDICAL CARE, AND HEATING?: NOT HARD AT ALL

## 2022-08-24 NOTE — PATIENT INSTRUCTIONS
Urinary tract infection unresponsive to Bactrim-still producing small amount of leukocytes in urine. Negative for nitrates. We will run a culture and sensitivity, in the meantime place patient on Macrobid twice daily for 10 days increase water intake. I will call patient with results of culture and decide whether or not antibiotics are to be continued and/or changed.   Patient agreeable to this plan

## 2022-08-24 NOTE — PROGRESS NOTES
Subjective:      Chief Complaint   Patient presents with    Urinary Tract Infection     Did evisit bactrim didn't work still having symptoms       Patient ID: Catina Dang is a 27 y.o. female who presents for continued symptoms of Urinary tract infection, pressure, frequency, foul-smelling urine. States she did an E-visit for possible Urinary tract infection back on 8/19/22. At that time she was prescribed 3 days of Bactrim which she completed and states she has found little relief. We will change her to Macrobid twice daily for 10 days, increase fluids and get a culture and sensitivity. Her urine dip today only had a small amount of leukocytes no nitrates. HPI see above    Family History   Problem Relation Age of Onset    Breast Cancer Mother     Asthma Mother     Diabetes Father     Alcohol Abuse Father     Arthritis Father     Breast Cancer Maternal Aunt     Breast Cancer Paternal Aunt     Cancer Maternal Grandmother     Breast Cancer Maternal Grandmother     Alcohol Abuse Paternal Grandfather     Asthma Sister     Breast Cancer Paternal Aunt     Cancer Paternal Aunt     Cancer Maternal Aunt        Social History     Socioeconomic History    Marital status: Single     Spouse name: Not on file    Number of children: Not on file    Years of education: Not on file    Highest education level: Not on file   Occupational History    Not on file   Tobacco Use    Smoking status: Passive Smoke Exposure - Never Smoker    Smokeless tobacco: Never   Vaping Use    Vaping Use: Never used   Substance and Sexual Activity    Alcohol use:  Yes     Alcohol/week: 2.0 standard drinks     Types: 2 Cans of beer per week     Comment: 2-3 drinks per week    Drug use: Not Currently    Sexual activity: Not Currently     Partners: Male   Other Topics Concern    Not on file   Social History Narrative    Not on file     Social Determinants of Health     Financial Resource Strain: Low Risk     Difficulty of Paying Living Expenses: Not hard at all   Food Insecurity: No Food Insecurity    Worried About [address] [employer] in the Last Year: Never true    Ran Out of Food in the Last Year: Never true   Transportation Needs: No Transportation Needs    Lack of Transportation (Medical): No    Lack of Transportation (Non-Medical): No   Physical Activity: Not on file   Stress: Not on file   Social Connections: Not on file   Intimate Partner Violence: Not on file   Housing Stability: Unknown    Unable to Pay for Housing in the Last Year: No    Number of Places Lived in the Last Year: Not on file    Unstable Housing in the Last Year: No       Current Outpatient Medications on File Prior to Visit   Medication Sig Dispense Refill    diazePAM (VALIUM) 2 MG tablet       levothyroxine (SYNTHROID) 88 MCG tablet Take 1 tablet by mouth Daily 90 tablet 1    vitamin D (ERGOCALCIFEROL) 1.25 MG (33083 UT) CAPS capsule Take 1 capsule by mouth once a week 12 capsule 1    drospirenone-ethinyl estradiol (SALLIE) 3-0.03 MG TABS TAKE ONE TABLET BY MOUTH DAILY 28 tablet 1    albuterol sulfate HFA (VENTOLIN HFA) 108 (90 Base) MCG/ACT inhaler Inhale 2 puffs into the lungs 4 times daily as needed for Wheezing 1 each 5    buPROPion (WELLBUTRIN XL) 150 MG extended release tablet Take 150 mg by mouth daily      Sertraline HCl 150 MG CAPS Take 150 mg by mouth daily       fluticasone (FLONASE) 50 MCG/ACT nasal spray 1 spray by Each Nostril route 2 times daily (Patient taking differently: 1 spray by Each Nostril route as needed) 1 Bottle 2    SUNOSI 150 MG TABS daily       methylphenidate (RITALIN LA) 20 MG extended release capsule Take 20 mg by mouth 2 times daily. methylphenidate (RITALIN) 10 MG tablet Take 10 mg by mouth as needed. In addition to 20 mg bid       No current facility-administered medications on file prior to visit. Review of Systems   Respiratory: Negative. Negative for shortness of breath. Cardiovascular: Negative. Negative for chest pain. Endocrine:        Hypothyroidism-Synthroid   Genitourinary:         Birth control estradiol   Psychiatric/Behavioral:  Positive for sleep disturbance (Sunosi). The patient is nervous/anxious (Valium). ADHD-Ritalin  Depression-sertraline     Objective:     Physical Exam  Vitals reviewed. Constitutional:       Appearance: Normal appearance. She is obese. HENT:      Head: Normocephalic and atraumatic. Cardiovascular:      Heart sounds: Normal heart sounds. No murmur heard. No friction rub. No gallop. Pulmonary:      Breath sounds: No wheezing, rhonchi or rales. Chest:      Chest wall: No tenderness. Skin:     General: Skin is warm and dry. Neurological:      Mental Status: She is alert and oriented to person, place, and time. Psychiatric:         Mood and Affect: Mood normal.         Behavior: Behavior normal.         Thought Content: Thought content normal.         Judgment: Judgment normal.       Assessment:     1. Lower abdominal pain    - POCT Urinalysis No Micro (Auto)  - Culture, Urine    2. Leukocytes in urine    - Culture, Urine    3.  Urinary tract infection without hematuria, site unspecified  Changed to Macrobid twice daily for 10 days    Plan:     As above  Call patient with results of culture and sensitivity when available

## 2022-08-25 LAB — URINE CULTURE, ROUTINE: NORMAL

## 2022-09-01 ENCOUNTER — PATIENT MESSAGE (OUTPATIENT)
Dept: FAMILY MEDICINE CLINIC | Age: 27
End: 2022-09-01

## 2022-09-01 NOTE — TELEPHONE ENCOUNTER
From: Cynthia Mcginnis  To: Dr. Briseyda Key: 9/1/2022 10:53 AM EDT  Subject: UTI Like Symptoms    Hello-    I'm still having UTI like symptoms. I consistently drink lots of water already but have increased with no relief. There is still some burning/discomfort both during and after using the restroom. I feel as though I'm having trouble emptying my bladder and will have to sit longer until it passes. I also feel like I have to pee even if there isn't anything left.  I think it would be beneficial to have a physical exam and test for yeast infection, STIs, etc.     Thanks, Jose Humphreys

## 2022-09-16 ENCOUNTER — OFFICE VISIT (OUTPATIENT)
Dept: FAMILY MEDICINE CLINIC | Age: 27
End: 2022-09-16

## 2022-09-16 VITALS
DIASTOLIC BLOOD PRESSURE: 83 MMHG | WEIGHT: 195 LBS | TEMPERATURE: 97.1 F | BODY MASS INDEX: 36.82 KG/M2 | HEART RATE: 96 BPM | SYSTOLIC BLOOD PRESSURE: 118 MMHG | RESPIRATION RATE: 16 BRPM | HEIGHT: 61 IN | OXYGEN SATURATION: 98 %

## 2022-09-16 DIAGNOSIS — R30.0 DYSURIA: ICD-10-CM

## 2022-09-16 DIAGNOSIS — N76.0 ACUTE VAGINITIS: Primary | ICD-10-CM

## 2022-09-16 LAB
BILIRUBIN, POC: NEGATIVE
BLOOD URINE, POC: NEGATIVE
CLARITY, POC: NORMAL
COLOR, POC: NORMAL
GLUCOSE URINE, POC: NEGATIVE
KETONES, POC: NEGATIVE
LEUKOCYTE EST, POC: NORMAL
NITRITE, POC: NEGATIVE
PH, POC: 6.5
PROTEIN, POC: NEGATIVE
SPECIFIC GRAVITY, POC: 1.01
UROBILINOGEN, POC: NORMAL

## 2022-09-16 RX ORDER — FLUCONAZOLE 150 MG/1
150 TABLET ORAL ONCE
Qty: 1 TABLET | Refills: 0 | Status: SHIPPED | OUTPATIENT
Start: 2022-09-16 | End: 2022-09-16

## 2022-09-16 ASSESSMENT — PATIENT HEALTH QUESTIONNAIRE - PHQ9
SUM OF ALL RESPONSES TO PHQ QUESTIONS 1-9: 0
SUM OF ALL RESPONSES TO PHQ QUESTIONS 1-9: 0
SUM OF ALL RESPONSES TO PHQ9 QUESTIONS 1 & 2: 0
1. LITTLE INTEREST OR PLEASURE IN DOING THINGS: 0
SUM OF ALL RESPONSES TO PHQ QUESTIONS 1-9: 0
SUM OF ALL RESPONSES TO PHQ QUESTIONS 1-9: 0
2. FEELING DOWN, DEPRESSED OR HOPELESS: 0
DEPRESSION UNABLE TO ASSESS: FUNCTIONAL CAPACITY MOTIVATION LIMITS ACCURACY

## 2022-09-16 ASSESSMENT — ANXIETY QUESTIONNAIRES
IF YOU CHECKED OFF ANY PROBLEMS ON THIS QUESTIONNAIRE, HOW DIFFICULT HAVE THESE PROBLEMS MADE IT FOR YOU TO DO YOUR WORK, TAKE CARE OF THINGS AT HOME, OR GET ALONG WITH OTHER PEOPLE: NOT DIFFICULT AT ALL
4. TROUBLE RELAXING: 0
GAD7 TOTAL SCORE: 0
5. BEING SO RESTLESS THAT IT IS HARD TO SIT STILL: 0
3. WORRYING TOO MUCH ABOUT DIFFERENT THINGS: 0
6. BECOMING EASILY ANNOYED OR IRRITABLE: 0
1. FEELING NERVOUS, ANXIOUS, OR ON EDGE: 0
2. NOT BEING ABLE TO STOP OR CONTROL WORRYING: 0
7. FEELING AFRAID AS IF SOMETHING AWFUL MIGHT HAPPEN: 0

## 2022-09-16 ASSESSMENT — ENCOUNTER SYMPTOMS
VOMITING: 0
SHORTNESS OF BREATH: 0
NAUSEA: 0
COUGH: 0
DIARRHEA: 0

## 2022-09-16 NOTE — PROGRESS NOTES
2022     Chief Complaint   Patient presents with    Annual Exam     Physical would like tested for STI     Urinary Tract Infection     Feels like pressure . Pain in pelvic along with pressure feeling        Yojana Mac (:  1995) is a 27 y.o. female, here for evaluation of the following medical concerns:    HPI  Intermittent symptoms for past month- dysuria, frequency, urgency, lower pelvic pressure. Was seen virtually and diagnosed with UTI on 22- she completed 3 days of Bactrim. Her symptoms did not resolve and was then seen in office on 22. Urinalysis at that time was normal with the exception of small amount of leukocytes. She was given 10 day course of Macrobid. Urine culture came back with no growth. Has burning with urination, sometimes during and after. Mild vaginal itching a few days. No vaginal discharge. Pelvic pressure and urinary frequency. Last sexually active over one month ago. She sometimes feels like she is not fully emptying her bladder. Denies fever, hematuria or flank pain. States when she was young had a lot of urinary symptoms and was on medication for it but does not know much detail regarding this. Of note she has been having laser hair removal in the pubic, vaginal area recently. Results for POC orders placed in visit on 22   POCT Urinalysis no Micro   Result Value Ref Range    Color, UA      Clarity, UA      Glucose, UA POC Negative     Bilirubin, UA Negative     Ketones, UA Negative     Spec Grav, UA 1.015     Blood, UA POC Negative     pH, UA 6.5     Protein, UA POC Negative     Urobilinogen, UA 0.2 E.U./dL     Leukocytes, UA Trace     Nitrite, UA Negative        Review of Systems   Constitutional:  Negative for chills, fatigue and fever. Respiratory:  Negative for cough and shortness of breath. Cardiovascular:  Negative for chest pain and leg swelling. Gastrointestinal:  Negative for diarrhea, nausea and vomiting.    Genitourinary: Positive for dysuria, frequency, pelvic pain and urgency. Negative for difficulty urinating, enuresis, flank pain, genital sores, hematuria, menstrual problem, vaginal bleeding and vaginal discharge. Neurological:  Negative for dizziness and headaches. All other systems reviewed and are negative. Prior to Visit Medications    Medication Sig Taking? Authorizing Provider   fluconazole (DIFLUCAN) 150 MG tablet Take 1 tablet by mouth once for 1 dose Yes CECY Del Rosario - CNP   diazePAM (VALIUM) 2 MG tablet  Yes Historical Provider, MD   levothyroxine (SYNTHROID) 88 MCG tablet Take 1 tablet by mouth Daily Yes Jaya Hu MD   vitamin D (ERGOCALCIFEROL) 1.25 MG (81331 UT) CAPS capsule Take 1 capsule by mouth once a week Yes Jaya Hu MD   drospirenone-ethinyl estradiol (SALLIE) 3-0.03 MG TABS TAKE ONE TABLET BY MOUTH DAILY Yes Alejo Guthrie DO   albuterol sulfate HFA (VENTOLIN HFA) 108 (90 Base) MCG/ACT inhaler Inhale 2 puffs into the lungs 4 times daily as needed for Wheezing Yes Alejo Guthrie DO   buPROPion (WELLBUTRIN XL) 150 MG extended release tablet Take 150 mg by mouth daily Yes Historical Provider, MD   Sertraline HCl 150 MG CAPS Take 200 mg by mouth daily Yes Historical Provider, MD   fluticasone (FLONASE) 50 MCG/ACT nasal spray 1 spray by Each Nostril route 2 times daily  Patient taking differently: 1 spray by Each Nostril route as needed Yes Chauncey Santillan MD   SUNOSI 150 MG TABS daily  Yes Historical Provider, MD   methylphenidate (RITALIN LA) 20 MG extended release capsule Take 20 mg by mouth 2 times daily. Yes Historical Provider, MD   methylphenidate (RITALIN) 10 MG tablet Take 10 mg by mouth as needed. In addition to 20 mg bid Yes Historical Provider, MD        Social History     Tobacco Use    Smoking status: Never     Passive exposure: Yes    Smokeless tobacco: Never   Substance Use Topics    Alcohol use:  Yes     Alcohol/week: 2.0 standard drinks     Types: 2 Cans of beer per week     Comment: 2-3 drinks per week        Vitals:    09/16/22 0857   BP: 118/83   Site: Left Upper Arm   Position: Sitting   Pulse: 96   Resp: 16   Temp: 97.1 °F (36.2 °C)   TempSrc: Temporal   SpO2: 98%   Weight: 195 lb (88.5 kg)   Height: 5' 1\" (1.549 m)     Estimated body mass index is 36.84 kg/m² as calculated from the following:    Height as of this encounter: 5' 1\" (1.549 m). Weight as of this encounter: 195 lb (88.5 kg). Physical Exam  Vitals and nursing note reviewed. Constitutional:       General: She is not in acute distress. Appearance: Normal appearance. She is well-developed. She is not ill-appearing, toxic-appearing or diaphoretic. HENT:      Head: Normocephalic and atraumatic. Eyes:      General:         Right eye: No discharge. Left eye: No discharge. Extraocular Movements: Extraocular movements intact. Conjunctiva/sclera: Conjunctivae normal.      Pupils: Pupils are equal, round, and reactive to light. Cardiovascular:      Rate and Rhythm: Normal rate and regular rhythm. Heart sounds: Normal heart sounds, S1 normal and S2 normal. No murmur heard. No friction rub. No gallop. Pulmonary:      Effort: Pulmonary effort is normal. No respiratory distress. Breath sounds: Normal breath sounds. No stridor. No wheezing or rales. Genitourinary:     Exam position: Lithotomy position. Labia:         Right: No rash. Left: No rash. Vagina: No signs of injury. Erythema (bilateral erythema in vagina) present. No vaginal discharge or tenderness. Adnexa: Right adnexa normal and left adnexa normal.   Neurological:      General: No focal deficit present. Mental Status: She is alert and oriented to person, place, and time. Mental status is at baseline. Cranial Nerves: No cranial nerve deficit. Psychiatric:         Speech: Speech normal.       ASSESSMENT/PLAN:  1.  Acute vaginitis  - VAGINAL PATHOGENS PROBE *A  - C.trachomatis N.gonorrhoeae DNA, Urine  - fluconazole (DIFLUCAN) 150 MG tablet; Take 1 tablet by mouth once for 1 dose  Dispense: 1 tablet; Refill: 0    2. Dysuria  - POCT Urinalysis no Micro  - Urinary Tract Infection Organism and Resistance ID by PCR  - C.trachomatis N.gonorrhoeae DNA, Urine    - urinalysis today is normal with the exception of trace leukocytes. Recent urine culture was negative. Doubt symptoms are related to urinary tract infection. On exam does have some diffuse erythema of the vagina. Will send vaginal path probe and G/C testing. Given diflucan 150 mg x 1 dose today. Will send urine culture to definitively r/o as cause. Discussed good water intake and avoid bladder irritants. Return if symptoms worsen or fail to improve. An electronic signature was used to authenticate this note.     --Diogenes Ray, CECY - CNP on 9/16/2022 at 9:45 AM

## 2022-09-16 NOTE — PATIENT INSTRUCTIONS
Diflucan dose for vaginitis  Await culture and vaginal swab  Good water intake  Over bladder irritants  Follow up as needed

## 2022-09-17 LAB
ACINETOBACTER BAUMANNII BY PCR: NOT DETECTED
BACTEROIDES FRAGILIS BY PCR: NOT DETECTED
CANDIDA SPECIES, DNA PROBE: ABNORMAL
CARBAPENEM RESISTANCE OXA-48 GENE BY PCR: NOT DETECTED
CEPHALOSPORIN RESISTANCE AMPC GENE: NOT DETECTED
CITROBACTER FREUNDII: NOT DETECTED
ENTEROBACTER CLOACAE: NOT DETECTED
ENTEROCOCCUS SPP. (E. FAECALIS, E. FAECIUM): NOT DETECTED
ESBL RESISTANCE: NOT DETECTED
ESCHERICHIA COLI BY PCR: NOT DETECTED
GARDNERELLA VAGINALIS, DNA PROBE: ABNORMAL
KLEBSIELLA OXYTOCA BY PCR: NOT DETECTED
KLEBSIELLA PNEUMONIAE: NOT DETECTED
MACROLIDE RESISTANCE: NOT DETECTED
METHICILLIN RESISTANCE: NOT DETECTED
MORGANELLA MORGANII: NOT DETECTED
PROTEUS SPP (PROTEUS MIRABILIS, PROTEUS VULGARIS): NOT DETECTED
PROVIDENCIA STUARTII: NOT DETECTED
PSEUDOMONAS AERUGINOSA BY PCR: NOT DETECTED
QUINOLONE AND FLUOROQUINOLONE RESISTANCE: NOT DETECTED
SERRATIA MARCESCENS BY PCR: NOT DETECTED
STAPHYLOCOCCUS AUREUS BY PCR: NOT DETECTED
STAPHYLOCOCCUS SAPROPHYTICUS: NOT DETECTED
STREPTOCOCCUS AGALACTIAE BY PCR: NOT DETECTED
STREPTOCOCCUS PYOGENES  BY PCR: NOT DETECTED
TETRACYCLINE RESISTANCE: NOT DETECTED
TRICHOMONAS VAGINALIS DNA: ABNORMAL
TRIMETHOPRIM/SULFONAMIDE RESISTANCE: NOT DETECTED

## 2022-09-19 LAB
C. TRACHOMATIS DNA ,URINE: NEGATIVE
N. GONORRHOEAE DNA, URINE: NEGATIVE

## 2022-09-19 RX ORDER — METRONIDAZOLE 500 MG/1
500 TABLET ORAL 2 TIMES DAILY
Qty: 14 TABLET | Refills: 0 | Status: SHIPPED | OUTPATIENT
Start: 2022-09-19 | End: 2022-09-26

## 2022-10-04 DIAGNOSIS — E55.9 VITAMIN D INSUFFICIENCY: ICD-10-CM

## 2022-10-04 DIAGNOSIS — E28.2 PCOS (POLYCYSTIC OVARIAN SYNDROME): ICD-10-CM

## 2022-10-04 DIAGNOSIS — E03.9 ACQUIRED HYPOTHYROIDISM: ICD-10-CM

## 2022-10-04 LAB
A/G RATIO: 1.3 (ref 1.1–2.2)
ALBUMIN SERPL-MCNC: 3.9 G/DL (ref 3.4–5)
ALP BLD-CCNC: 89 U/L (ref 40–129)
ALT SERPL-CCNC: 14 U/L (ref 10–40)
ANION GAP SERPL CALCULATED.3IONS-SCNC: 15 MMOL/L (ref 3–16)
AST SERPL-CCNC: 23 U/L (ref 15–37)
BILIRUB SERPL-MCNC: <0.2 MG/DL (ref 0–1)
BUN BLDV-MCNC: 5 MG/DL (ref 7–20)
CALCIUM SERPL-MCNC: 9.1 MG/DL (ref 8.3–10.6)
CHLORIDE BLD-SCNC: 99 MMOL/L (ref 99–110)
CO2: 25 MMOL/L (ref 21–32)
CREAT SERPL-MCNC: 0.6 MG/DL (ref 0.6–1.1)
GFR AFRICAN AMERICAN: >60
GFR NON-AFRICAN AMERICAN: >60
GLUCOSE BLD-MCNC: 79 MG/DL (ref 70–99)
POTASSIUM SERPL-SCNC: 4.5 MMOL/L (ref 3.5–5.1)
SODIUM BLD-SCNC: 139 MMOL/L (ref 136–145)
T3 FREE: 3.1 PG/ML (ref 2.3–4.2)
T4 FREE: 1.4 NG/DL (ref 0.9–1.8)
TOTAL PROTEIN: 7 G/DL (ref 6.4–8.2)
VITAMIN D 25-HYDROXY: 42.5 NG/ML

## 2022-10-05 ENCOUNTER — OFFICE VISIT (OUTPATIENT)
Dept: ENDOCRINOLOGY | Age: 27
End: 2022-10-05
Payer: COMMERCIAL

## 2022-10-05 VITALS
BODY MASS INDEX: 37.38 KG/M2 | DIASTOLIC BLOOD PRESSURE: 83 MMHG | WEIGHT: 198 LBS | HEIGHT: 61 IN | RESPIRATION RATE: 14 BRPM | SYSTOLIC BLOOD PRESSURE: 121 MMHG | TEMPERATURE: 98 F | HEART RATE: 93 BPM | OXYGEN SATURATION: 96 %

## 2022-10-05 DIAGNOSIS — E03.9 ACQUIRED HYPOTHYROIDISM: Primary | ICD-10-CM

## 2022-10-05 DIAGNOSIS — E28.2 PCOS (POLYCYSTIC OVARIAN SYNDROME): ICD-10-CM

## 2022-10-05 DIAGNOSIS — E55.9 VITAMIN D DEFICIENCY: ICD-10-CM

## 2022-10-05 DIAGNOSIS — E66.9 CLASS 2 OBESITY WITH BODY MASS INDEX (BMI) OF 37.0 TO 37.9 IN ADULT, UNSPECIFIED OBESITY TYPE, UNSPECIFIED WHETHER SERIOUS COMORBIDITY PRESENT: ICD-10-CM

## 2022-10-05 DIAGNOSIS — E06.3 HASHIMOTO'S THYROIDITIS: ICD-10-CM

## 2022-10-05 PROBLEM — E66.812 CLASS 2 OBESITY WITH BODY MASS INDEX (BMI) OF 37.0 TO 37.9 IN ADULT: Status: ACTIVE | Noted: 2022-10-05

## 2022-10-05 PROCEDURE — 99214 OFFICE O/P EST MOD 30 MIN: CPT | Performed by: INTERNAL MEDICINE

## 2022-10-05 NOTE — PROGRESS NOTES
SUBJECTIVE:  Johnathon Tidwell is a 32 y.o. female who is being evaluated for hypothyroidism. 1. Acquired hypothyroidism  This started in 2009. Patient was diagnosed with hypothyroidism. The problem has been unchanged. Previous thyroid studies include: TSH and free thyroxine. Patient started medication in 2013. Currently patient is on: levothyroxine. Misses  0 doses a month. Current complaints: hypersomnia, fatigue, anxiety    Past medical history of hypothyroidism, PCOS, obesity, vitamin D, hypersomnia    2. PCOS (polycystic ovarian syndrome)  Never used Metformin   On birth control  Could not tolerate Aldactone, did not take for a long time  Has hair on the face  Shaves face, BCP helps some  On BCP no period  Off BCP irregular periods  5 yo started periods    3. Vitamin D insufficiency  Has fatigue    4. Hashimoto's thyroiditis  History of obstructive symptoms: difficulty swallowing No, changes in voice/hoarseness No.  History of radiation to patient's neck: No  Resent iodine exposure: No  Family history includes hyperthyroidism, hypothyroidism, Hashmotos, graves  Family history of thyroid cancer: No    5. Obesity  Eats healthier, active       EXAMINATION:   THYROID ULTRASOUND       10/13/2020       COMPARISON:   None. HISTORY:   ORDERING SYSTEM PROVIDED HISTORY: Hashimoto's thyroiditis   TECHNOLOGIST PROVIDED HISTORY:   Reason for exam:->neck pressure. H/o hashimoto's thyroiditis       Patient on thyroid meds for 8 or 9 years. FINDINGS:   Right thyroid lobe:  4.0 x 1.6 x 1.5 cm       Left thyroid lobe:  3.9 x 1.1 x 1.1 cm       Isthmus:  2.9 mm. Thyroid Gland:  Thyroid gland demonstrates homogeneous echotexture and normal   vascularity. Nodules: No thyroid nodules are present. Cervical lymphadenopathy: No abnormal lymph nodes in the imaged portions of   the neck. Impression   Normal sonographic appearance of the thyroid.   No thyroid nodule or finding   of active thyroiditis.              Past Medical History:   Diagnosis Date    Allergic rhinitis     Anxiety     Asthma     Breast cyst     Reports a cyst in right breast as a teenager- not sure of breast imaging     Depression     Dizziness     GERD (gastroesophageal reflux disease)     Hashimoto's thyroiditis     Headache     HPV (human papilloma virus) infection     Hypersomnia     PCOS (polycystic ovarian syndrome)      Patient Active Problem List    Diagnosis Date Noted    Vitamin D deficiency 10/05/2022    Class 2 obesity with body mass index (BMI) of 37.0 to 37.9 in adult 10/05/2022    Carpal tunnel syndrome     Carpal tunnel syndrome, bilateral 01/26/2022    PCOS (polycystic ovarian syndrome)     Acquired hypothyroidism     Class 2 obesity with body mass index (BMI) of 35.0 to 35.9 in adult     Family history of breast cancer 04/16/2021    Hashimoto's thyroiditis 03/18/2020    Vitamin D insufficiency 03/18/2020    Pain in both knees 03/18/2020    Asthma in adult 03/18/2020    Persistent depressive disorder 03/18/2020     Past Surgical History:   Procedure Laterality Date    BREAST ENHANCEMENT SURGERY      CARPAL TUNNEL RELEASE Left 03/22/2022    LEFT CARPAL TUNNEL RELEASE performed by Yolanda Luciano MD at [address] [place] [address] Right 04/05/2022    RIGHT CARPAL TUNNEL [facility]      WISDOM TOOTH EXTRACTION       Family History   Problem Relation Age of Onset    Breast Cancer Mother     Asthma Mother     Diabetes Father     Alcohol Abuse Father     Arthritis Father     Breast Cancer Maternal Aunt     Breast Cancer Paternal Aunt     Cancer Maternal Grandmother     Breast Cancer Maternal Grandmother     Alcohol Abuse Paternal Grandfather     Asthma Sister     Breast Cancer Paternal Aunt     Cancer Paternal Aunt     Cancer Maternal Aunt      Social History     Socioeconomic History    Marital status: Single     Spouse name: None    Number of children: None    Years of education: None    Highest education level: None   Tobacco Use    Smoking status: Never     Passive exposure: Yes    Smokeless tobacco: Never   Vaping Use    Vaping Use: Never used   Substance and Sexual Activity    Alcohol use: Not Currently     Alcohol/week: 2.0 standard drinks     Types: 2 Cans of beer per week     Comment: 2-3 drinks per week    Drug use: Not Currently    Sexual activity: Not Currently     Partners: Male     Social Determinants of Health     Financial Resource Strain: Low Risk     Difficulty of Paying Living Expenses: Not hard at all   Food Insecurity: No Food Insecurity    Worried About [address] [place] [address] [place] [employer] in the Last Year: Never true    Ran Out of Food in the Last Year: Never true   Transportation Needs: No Transportation Needs    Lack of Transportation (Medical): No    Lack of Transportation (Non-Medical): No   Housing Stability: Unknown    Unable to Pay for Housing in the Last Year: No    Unstable Housing in the Last Year: No     Current Outpatient Medications   Medication Sig Dispense Refill    diazePAM (VALIUM) 2 MG tablet       levothyroxine (SYNTHROID) 88 MCG tablet Take 1 tablet by mouth Daily 90 tablet 1    vitamin D (ERGOCALCIFEROL) 1.25 MG (60951 UT) CAPS capsule Take 1 capsule by mouth once a week 12 capsule 1    drospirenone-ethinyl estradiol (SALLIE) 3-0.03 MG TABS TAKE ONE TABLET BY MOUTH DAILY 28 tablet 1    albuterol sulfate HFA (VENTOLIN HFA) 108 (90 Base) MCG/ACT inhaler Inhale 2 puffs into the lungs 4 times daily as needed for Wheezing 1 each 5    buPROPion (WELLBUTRIN XL) 150 MG extended release tablet Take 150 mg by mouth daily      Sertraline HCl 150 MG CAPS Take 200 mg by mouth daily      fluticasone (FLONASE) 50 MCG/ACT nasal spray 1 spray by Each Nostril route 2 times daily (Patient taking differently: 1 spray by Each Nostril route as needed) 1 Bottle 2    SUNOSI 150 MG TABS daily methylphenidate (RITALIN LA) 20 MG extended release capsule Take 20 mg by mouth 2 times daily. methylphenidate (RITALIN) 10 MG tablet Take 10 mg by mouth as needed. In addition to 20 mg bid       No current facility-administered medications for this visit.      Allergies   Allergen Reactions    Latex Rash     Family Status   Relation Name Status    Mother Pradeep Nye Day Alive    Father Jennifer Guthrie  (Not Specified)    C/ Adam Manuel 33 (Not Specified)    MGM Jake Solis (Not Specified)    PGF Darylsusi Morales (Not Specified)    Sister Kristie Salcedo (Not Specified)    Janina Lopez Shakira (Not Specified)    Lizeth Damico (Not Specified)    Myrle Apley (Not Specified)       Review of Systems:  Constitutional: has fatigue, no fever, has recent weight gain, no recent weight loss, no changes in appetite  Eyes: no eye pain, no change in vision, no eye redness, no eye irritation, no double vision  Ears, nose, throat: has nasal congestion, no sore throat, no earache, no decrease in hearing, no hoarseness, no dry mouth, has sinus problems, no difficulty swallowing, no neck lumps, no dental problems, no mouth sores, no ringing in ears  Pulmonary: no shortness of breath, no wheezing, no dyspnea on exertion, no cough  Cardiovascular: no chest pain, no lower extremity edema, no orthopnea, no intermittent leg claudication, no palpitations  Gastrointestinal: no abdominal pain, no nausea, no vomiting, no diarrhea, no constipation, no dysphagia, no heartburn, no bloating  Genitourinary: no dysuria, no urinary incontinence, no urinary hesitancy, no urinary frequency, no feelings of urinary urgency, no nocturia  Musculoskeletal: no joint swelling, no joint stiffness, has joint pain, no muscle cramps, no muscle pain, no bone pain  Integument/Breast: no hair loss, no skin rashes, no skin lesions, no itching, has dry skin  Neurological: no numbness, no tingling, no weakness, no confusion, nhas headaches, no dizziness, no fainting, no tremors, no decrease in memory, no balance problems  Psychiatric: has anxiety, has depression, no insomnia  Hematologic/Lymphatic: no tendency for easy bleeding, no swollen lymph nodes, no tendency for easy bruising  Immunology: has seasonal allergies, no frequent infections, no frequent illnesses  Endocrine: has temperature intolerance    /83   Pulse 93   Temp 98 °F (36.7 °C)   Resp 14   Ht 5' 1\" (1.549 m)   Wt 198 lb (89.8 kg)   SpO2 96%   BMI 37.41 kg/m²    Wt Readings from Last 3 Encounters:   10/05/22 198 lb (89.8 kg)   09/16/22 195 lb (88.5 kg)   08/24/22 194 lb (88 kg)     Body mass index is 37.41 kg/m².     OBJECTIVE:  Constitutional: no acute distress, well appearing and well nourished  Psychiatric: oriented to person, place and time, judgement and insight and normal, recent and remote memory and intact and mood and affect are normal  Skin: skin and subcutaneous tissue is normal without mass, normal turgor  Head and Face: examination of head and face revealed no abnormalities  Eyes: no lid or conjunctival swelling, erythema or discharge, pupils are normal, equal, round, reactive to light  Ears/Nose: external inspection of ears and nose revealed no abnormalities, hearing is grossly normal  Oropharynx/Mouth/Face: lips, tongue and gums are normal with no lesions, the voice quality was normal  Neck: neck is supple and symmetric, with midline trachea and no masses, thyroid is normal  Lymphatics: normal cervical lymph nodes, normal supraclavicular nodes  Pulmonary: no increased work of breathing or signs of respiratory distress, lungs are clear to auscultation  Cardiovascular: normal heart rate and rhythm, normal S1 and S2, no murmurs and pedal pulses and 2+ bilaterally, No edema  Abdomen: abdomen is soft, non-tender with no masses  Musculoskeletal: normal gait and station and exam of the digits and nails are normal  Neurological: normal coordination and normal general cortical function      Lab Review:    No results found for: WBC, HGB, HCT, MCV, PLT  Lab Results   Component Value Date/Time     10/04/2022 11:30 AM    K 4.5 10/04/2022 11:30 AM    CL 99 10/04/2022 11:30 AM    CO2 25 10/04/2022 11:30 AM    BUN 5 10/04/2022 11:30 AM    CREATININE 0.6 10/04/2022 11:30 AM    GLUCOSE 79 10/04/2022 11:30 AM    CALCIUM 9.1 10/04/2022 11:30 AM    PROT 7.0 10/04/2022 11:30 AM    LABALBU 3.9 10/04/2022 11:30 AM    BILITOT <0.2 10/04/2022 11:30 AM    ALKPHOS 89 10/04/2022 11:30 AM    AST 23 10/04/2022 11:30 AM    ALT 14 10/04/2022 11:30 AM    LABGLOM >60 10/04/2022 11:30 AM    GFRAA >60 10/04/2022 11:30 AM    AGRATIO 1.3 10/04/2022 11:30 AM    GLOB 2.8 09/28/2021 09:50 AM     Lab Results   Component Value Date/Time    TSHFT4 0.86 08/20/2020 09:52 AM    TSH 2.53 05/10/2022 08:10 AM    FT3 3.1 10/04/2022 11:30 AM     No results found for: LABA1C  No results found for: EAG  Lab Results   Component Value Date/Time    CHOL 209 05/31/2022 09:12 AM     Lab Results   Component Value Date/Time    TRIG 173 05/31/2022 09:12 AM     Lab Results   Component Value Date/Time    HDL 64 05/31/2022 09:12 AM     Lab Results   Component Value Date/Time    LDLCALC 110 05/31/2022 09:12 AM     No results found for: LABVLDL, VLDL  No results found for: CHOLHDLRATIO  No results found for: LABMICR, [id number]  Lab Results   Component Value Date/Time    VITD25 42.5 10/04/2022 11:30 AM        ASSESSMENT/PLAN:    1. Acquired hypothyroidism  Call with TSH, adjust the dose  Still very tired. TSH 0.87-1.95-2.53  Had carpal tunnel sugery. Levothyroxine (SYNTHROID) 0.088 mg daily  - T3, Free; Future  - T4, Free; Future  - TSH without Reflex; Future    2.  PCOS (polycystic ovarian syndrome)  Tried Metformin, but was sick from it, also diarrhea  1 mg overnight dexamethasone suppression test-cortisol less than 0.8, appropriate suppression  Prolactin 14.6  ACTH 19  Testosterone 36  Free testosterone low  DHEA-S 164  - Comprehensive Metabolic Panel; Future  - DHEA-Sulfate; Future  - Testosterone, free, total; Future  - Insulin, total; Future    3. Vitamin D insufficiency  Vitamin D 50,000 IU weekly  25 hydroxy vitamin D 21.9-20.0-25.9-42.5  - Vitamin D 25 Hydroxy; Future    4. Hashimoto's thyroiditis  - T3, Free; Future  - T4, Free; Future  - TSH without Reflex; Future    5. Obesity  Diet, exercise  Dietician consult if insurance covers    Reviewed and/or ordered clinical lab results Yes  Reviewed and/or ordered radiology tests Yes   Reviewed and/or ordered other diagnostic tests No  Discussed test results with performing physician No  Independently reviewed image, tracing, or specimen No  Made a decision to obtain old records No  Reviewed and summarized old records Yes   Hypothyroidism  Hashimoto's thyroiditis  TSH 2.86  Levothyroxine 0.075 mg  PCOS  Obtained history from other than patient No    Rachel Tarango was counseled regarding symptoms of thyroid, PCOS diagnosis, course and complications of disease if inadequately treated, side effects of medications, diagnosis, treatment options, and prognosis, risks, benefits, complications, and alternatives of treatment, labs, imaging and other studies and treatment targets and goals. She understands instructions and counseling. Return in about 3 months (around 1/5/2023) for thyroid problems.     Electronically signed by Aubrey Buck MD on 10/5/2022 at 9:40 AM

## 2022-10-12 DIAGNOSIS — E03.9 ACQUIRED HYPOTHYROIDISM: ICD-10-CM

## 2022-10-12 DIAGNOSIS — E55.9 VITAMIN D DEFICIENCY: ICD-10-CM

## 2022-10-12 DIAGNOSIS — E06.3 HASHIMOTO'S THYROIDITIS: ICD-10-CM

## 2022-10-12 DIAGNOSIS — E28.2 PCOS (POLYCYSTIC OVARIAN SYNDROME): ICD-10-CM

## 2022-10-13 LAB — TSH SERPL DL<=0.05 MIU/L-ACNC: 1.64 UIU/ML (ref 0.27–4.2)

## 2022-10-16 ENCOUNTER — TELEPHONE (OUTPATIENT)
Dept: ENDOCRINOLOGY | Age: 27
End: 2022-10-16

## 2022-10-16 RX ORDER — LEVOTHYROXINE SODIUM 88 UG/1
88 TABLET ORAL DAILY
Qty: 90 TABLET | Refills: 1 | Status: SHIPPED | OUTPATIENT
Start: 2022-10-16 | End: 2022-10-18 | Stop reason: SDUPTHER

## 2022-11-06 ENCOUNTER — NURSE TRIAGE (OUTPATIENT)
Dept: OTHER | Facility: CLINIC | Age: 27
End: 2022-11-06

## 2022-11-07 ENCOUNTER — OFFICE VISIT (OUTPATIENT)
Dept: FAMILY MEDICINE CLINIC | Age: 27
End: 2022-11-07
Payer: COMMERCIAL

## 2022-11-07 VITALS
HEART RATE: 91 BPM | WEIGHT: 200 LBS | TEMPERATURE: 97.3 F | RESPIRATION RATE: 16 BRPM | SYSTOLIC BLOOD PRESSURE: 120 MMHG | DIASTOLIC BLOOD PRESSURE: 78 MMHG | OXYGEN SATURATION: 98 % | BODY MASS INDEX: 37.79 KG/M2

## 2022-11-07 DIAGNOSIS — M54.10 RADICULOPATHY OF ARM: ICD-10-CM

## 2022-11-07 DIAGNOSIS — M54.2 NECK PAIN: ICD-10-CM

## 2022-11-07 DIAGNOSIS — M25.512 PAIN IN JOINT OF LEFT SHOULDER: Primary | ICD-10-CM

## 2022-11-07 DIAGNOSIS — G56.03 CARPAL TUNNEL SYNDROME, BILATERAL: ICD-10-CM

## 2022-11-07 PROCEDURE — 99213 OFFICE O/P EST LOW 20 MIN: CPT | Performed by: NURSE PRACTITIONER

## 2022-11-07 RX ORDER — SERTRALINE HYDROCHLORIDE 100 MG/1
TABLET, FILM COATED ORAL 2 TIMES DAILY
COMMUNITY
Start: 2022-11-03

## 2022-11-07 RX ORDER — TRAMADOL HYDROCHLORIDE 50 MG/1
50 TABLET ORAL EVERY 6 HOURS PRN
Qty: 12 TABLET | Refills: 0 | Status: SHIPPED | OUTPATIENT
Start: 2022-11-07 | End: 2022-11-10

## 2022-11-07 RX ORDER — CYCLOBENZAPRINE HCL 10 MG
10 TABLET ORAL 3 TIMES DAILY PRN
Qty: 30 TABLET | Refills: 0 | Status: SHIPPED | OUTPATIENT
Start: 2022-11-07 | End: 2022-11-17

## 2022-11-07 ASSESSMENT — PATIENT HEALTH QUESTIONNAIRE - PHQ9
8. MOVING OR SPEAKING SO SLOWLY THAT OTHER PEOPLE COULD HAVE NOTICED. OR THE OPPOSITE, BEING SO FIGETY OR RESTLESS THAT YOU HAVE BEEN MOVING AROUND A LOT MORE THAN USUAL: 0
10. IF YOU CHECKED OFF ANY PROBLEMS, HOW DIFFICULT HAVE THESE PROBLEMS MADE IT FOR YOU TO DO YOUR WORK, TAKE CARE OF THINGS AT HOME, OR GET ALONG WITH OTHER PEOPLE: 0
SUM OF ALL RESPONSES TO PHQ QUESTIONS 1-9: 0
1. LITTLE INTEREST OR PLEASURE IN DOING THINGS: 0
SUM OF ALL RESPONSES TO PHQ QUESTIONS 1-9: 0
2. FEELING DOWN, DEPRESSED OR HOPELESS: 0
4. FEELING TIRED OR HAVING LITTLE ENERGY: 0
6. FEELING BAD ABOUT YOURSELF - OR THAT YOU ARE A FAILURE OR HAVE LET YOURSELF OR YOUR FAMILY DOWN: 0
SUM OF ALL RESPONSES TO PHQ QUESTIONS 1-9: 0
9. THOUGHTS THAT YOU WOULD BE BETTER OFF DEAD, OR OF HURTING YOURSELF: 0
3. TROUBLE FALLING OR STAYING ASLEEP: 0
5. POOR APPETITE OR OVEREATING: 0
SUM OF ALL RESPONSES TO PHQ QUESTIONS 1-9: 0
7. TROUBLE CONCENTRATING ON THINGS, SUCH AS READING THE NEWSPAPER OR WATCHING TELEVISION: 0
SUM OF ALL RESPONSES TO PHQ9 QUESTIONS 1 & 2: 0

## 2022-11-07 ASSESSMENT — ENCOUNTER SYMPTOMS
WHEEZING: 0
RESPIRATORY NEGATIVE: 1
BACK PAIN: 1
SHORTNESS OF BREATH: 0

## 2022-11-07 ASSESSMENT — ANXIETY QUESTIONNAIRES
5. BEING SO RESTLESS THAT IT IS HARD TO SIT STILL: 0
7. FEELING AFRAID AS IF SOMETHING AWFUL MIGHT HAPPEN: 0
GAD7 TOTAL SCORE: 3
1. FEELING NERVOUS, ANXIOUS, OR ON EDGE: 1
6. BECOMING EASILY ANNOYED OR IRRITABLE: 1
2. NOT BEING ABLE TO STOP OR CONTROL WORRYING: 0
4. TROUBLE RELAXING: 1
3. WORRYING TOO MUCH ABOUT DIFFERENT THINGS: 0
IF YOU CHECKED OFF ANY PROBLEMS ON THIS QUESTIONNAIRE, HOW DIFFICULT HAVE THESE PROBLEMS MADE IT FOR YOU TO DO YOUR WORK, TAKE CARE OF THINGS AT HOME, OR GET ALONG WITH OTHER PEOPLE: SOMEWHAT DIFFICULT

## 2022-11-07 NOTE — TELEPHONE ENCOUNTER
Location of patient: [place]    Subjective: Caller states \"For a few weeks the area where you usually get a flu shot hurts\"     Current Symptoms: Shooting pains down left arm, pain from shoulder to finger tips, chest pain earlier today, muscles are tender    Onset: 3 weeks ago; gradual    Associated Symptoms: irritability , fussiness    Pain Severity: 4/10; shooting; intermittent    Temperature: Denies fever     What has been tried: Ice, heating pad, pillow under knees, sleeping on a hard floor, Ibuprofen, muscle relaxers    LMP:  10/25/2022  Pregnant: No    Recommended disposition: See PCP within 3 Days    Care advice provided, patient verbalizes understanding; denies any other questions or concerns; instructed to call back for any new or worsening symptoms. Patient/caller agrees to follow-up with PCP     Attention Provider: Thank you for allowing me to participate in the care of your patient. The patient was connected to triage in response to symptoms provided. Please do not respond through this encounter as the response is not directed to a shared pool.     Reason for Disposition   [1] MODERATE pain (e.g., interferes with normal activities) AND [2] present > 3 days    Protocols used: Arm Pain-ADULT-

## 2022-11-07 NOTE — PROGRESS NOTES
Subjective:      Chief Complaint   Patient presents with    Arm Pain     Left shoulder into arm 3 weeks ago with certain movements, started Friday going into finger tips, waking her up at night        Patient ID: Carolynn Antonio is a 32 y.o. female who presents for pain in left shoulder going down her arm and into her hand. States it started Friday with her fingertips going numb during the night waking her up. History of bilateral carpal tunnel syndrome performed in March of this year by Dr. Monica Romeo. Pulses equal bilaterally nailbeds flush up regularly. As interview goes on patient states she believes the pain emanates from her neck rather than her shoulder. She does not recall lifting anything heavy or doing any unusual maneuver. She works all day long at her [employer] usually 8-hour days. She has used a heat pack alternating with ice giving her some relief but not complete relief. States she is unable to get sleep at night secondary to the pain.     HPI see above    Family History   Problem Relation Age of Onset    Breast Cancer Mother     Asthma Mother     Diabetes Father     Alcohol Abuse Father     Arthritis Father     Breast Cancer Maternal Aunt     Breast Cancer Paternal Aunt     Cancer Maternal Grandmother     Breast Cancer Maternal Grandmother     Alcohol Abuse Paternal Grandfather     Asthma Sister     Breast Cancer Paternal Aunt     Cancer Paternal Aunt     Cancer Maternal Aunt        Social History     Socioeconomic History    Marital status: Single     Spouse name: Not on file    Number of children: Not on file    Years of education: Not on file    Highest education level: Not on file   Occupational History    Not on file   Tobacco Use    Smoking status: Never     Passive exposure: Yes    Smokeless tobacco: Never   Vaping Use    Vaping Use: Never used   Substance and Sexual Activity    Alcohol use: Not Currently     Alcohol/week: 2.0 standard drinks     Types: 2 Cans of beer per week     Comment: 2-3 drinks per week    Drug use: Not Currently    Sexual activity: Not Currently     Partners: Male   Other Topics Concern    Not on file   Social History Narrative    Not on file     Social Determinants of Health     Financial Resource Strain: Low Risk     Difficulty of Paying Living Expenses: Not hard at all   Food Insecurity: No Food Insecurity    Worried About [address] in the Last Year: Never true    [address] in the Last Year: Never true   Transportation Needs: No Transportation Needs    Lack of Transportation (Medical): No    Lack of Transportation (Non-Medical): No   Physical Activity: Not on file   Stress: Not on file   Social Connections: Not on file   Intimate Partner Violence: Not on file   Housing Stability: Unknown    Unable to Pay for Housing in the Last Year: No    Number of Places Lived in the Last Year: Not on file    Unstable Housing in the Last Year: No       Current Outpatient Medications on File Prior to Visit   Medication Sig Dispense Refill    sertraline (ZOLOFT) 100 MG tablet in the morning and at bedtime      levothyroxine (SYNTHROID) 88 MCG tablet Take 1 tablet by mouth Daily 90 tablet 1    diazePAM (VALIUM) 2 MG tablet       vitamin D (ERGOCALCIFEROL) 1.25 MG (05432 UT) CAPS capsule Take 1 capsule by mouth once a week 12 capsule 1    drospirenone-ethinyl estradiol (SALLIE) 3-0.03 MG TABS TAKE ONE TABLET BY MOUTH DAILY 28 tablet 1    albuterol sulfate HFA (VENTOLIN HFA) 108 (90 Base) MCG/ACT inhaler Inhale 2 puffs into the lungs 4 times daily as needed for Wheezing 1 each 5    buPROPion (WELLBUTRIN XL) 150 MG extended release tablet Take 150 mg by mouth daily      fluticasone (FLONASE) 50 MCG/ACT nasal spray 1 spray by Each Nostril route 2 times daily (Patient taking differently: 1 spray by Each Nostril route as needed) 1 Bottle 2    SUNOSI 150 MG TABS daily       methylphenidate (RITALIN LA) 20 MG extended release capsule Take 20 mg by mouth 2 times daily. methylphenidate (RITALIN) 10 MG tablet Take 10 mg by mouth as needed. In addition to 20 mg bid       No current facility-administered medications on file prior to visit. Review of Systems   Respiratory: Negative. Negative for shortness of breath and wheezing. Mild asthma-albuterol   Cardiovascular: Negative. Negative for chest pain and palpitations. Endocrine:        Hashimoto's-Synthroid   Genitourinary:         Birth control estradiol Gill French)   Musculoskeletal:  Positive for back pain, myalgias, neck pain and neck stiffness. See HPI   Allergic/Immunologic: Positive for environmental allergies (Flonase). Neurological:  Positive for numbness. Psychiatric/Behavioral:  Positive for sleep disturbance (Hyperinsomnia-Ritalin). Anxiety/depression-Zoloft, diazepam, Wellbutrin     Objective:     Physical Exam  Vitals reviewed. Constitutional:       Appearance: Normal appearance. HENT:      Head: Normocephalic and atraumatic. Neck:      Vascular: No carotid bruit. Cardiovascular:      Rate and Rhythm: Regular rhythm. Pulses: Normal pulses. Heart sounds: Normal heart sounds. No murmur heard. No friction rub. No gallop. Pulmonary:      Effort: Pulmonary effort is normal.      Breath sounds: Normal breath sounds. No wheezing, rhonchi or rales. Chest:      Chest wall: No tenderness. Musculoskeletal:         General: Tenderness (Left arm) present. No swelling, deformity or signs of injury. Normal range of motion. Cervical back: Normal range of motion and neck supple. No rigidity or tenderness. Right lower leg: No edema. Left lower leg: No edema. Lymphadenopathy:      Cervical: No cervical adenopathy. Neurological:      General: No focal deficit present. Mental Status: She is alert and oriented to person, place, and time. Psychiatric:         Mood and Affect: Mood normal.         Behavior: Behavior normal.         Thought Content:  Thought content normal.         Judgment: Judgment normal.       Assessment:     1. Pain in joint of left shoulder  Radiculopathy suspect pain coming from neck, may be overuse syndrome  - XR LUMBAR SPINE (2-3 VIEWS); Future  - traMADol (ULTRAM) 50 MG tablet; Take 1 tablet by mouth every 6 hours as needed for Pain for up to 3 days. Intended supply: 3 days. Take lowest dose possible to manage pain  Dispense: 12 tablet; Refill: 0    2. Neck pain  X-ray    3. Carpal tunnel syndrome, bilateral  Surgery well-healed, pulses good extremities warm and pink and flushed readily    4.  Radiculopathy of arm  May be overuse syndrome    Plan:     As above  Flexeril  Ultram for severe pain or if unable to get sleep  Continue to use heat alternate with ice  Call midweek to let me know if she is feeling better, may need further imaging or perhaps even Ortho

## 2022-11-07 NOTE — PATIENT INSTRUCTIONS
Left arm pain (radiating from neck) pain going down her arm and into her hand. Hand going numb at night waking her from sleep. History of bilateral carpal tunnel done March of this year. X-ray of lumbar spine  May need further imaging  Flexeril/Ultram short-term    She will let me know if she receives any relief from this.   May need Ortho again

## 2022-11-08 ENCOUNTER — HOSPITAL ENCOUNTER (OUTPATIENT)
Dept: GENERAL RADIOLOGY | Age: 27
Discharge: HOME | End: 2022-11-08
Payer: COMMERCIAL

## 2022-11-08 DIAGNOSIS — M25.512 ACUTE PAIN OF LEFT SHOULDER: ICD-10-CM

## 2022-11-08 DIAGNOSIS — M25.512 ACUTE PAIN OF LEFT SHOULDER: Primary | ICD-10-CM

## 2022-11-08 PROCEDURE — 73030 X-RAY EXAM OF SHOULDER: CPT

## 2022-11-14 ENCOUNTER — TELEPHONE (OUTPATIENT)
Dept: FAMILY MEDICINE CLINIC | Age: 27
End: 2022-11-14

## 2022-11-14 DIAGNOSIS — M25.512 LEFT SHOULDER PAIN, UNSPECIFIED CHRONICITY: Primary | ICD-10-CM

## 2022-11-14 DIAGNOSIS — M25.512 ACUTE PAIN OF LEFT SHOULDER: Primary | ICD-10-CM

## 2022-11-14 RX ORDER — PREDNISONE 10 MG/1
TABLET ORAL
Qty: 30 TABLET | Refills: 0 | Status: SHIPPED | OUTPATIENT
Start: 2022-11-14

## 2022-11-14 NOTE — TELEPHONE ENCOUNTER
Smith Rasmussen had left shoulder imaging done on 11/8/22. Patient said her left shoulder is still painful. She would like to know what next step is.       Future Appointments   Date Time Provider Sanya Eleanor   2/1/2023  2:50 PM MD David Hernandez   Past appointment was 11/7/22,

## 2022-11-14 NOTE — PROGRESS NOTES
Spoke to patient regarding her shoulder pain. She still does experience the same type of shoulder pain with radiation of numbness and tingling down her arm. States it is worse at night. The Flexeril does help somewhat however the tramadol did not.   X-ray of shoulder was normal we will try her on a 12-day titrating burst of prednisone

## 2022-11-15 LAB
HEPATITIS B SURFACE ANTIGEN INTERPRETATION: NORMAL
HEPATITIS C ANTIBODY INTERPRETATION: NORMAL

## 2022-11-15 NOTE — TELEPHONE ENCOUNTER
Adrián Alston saw her for this, note reviewed.  Recommended ortho, go ahead and give her contact info for Dr Joe Whitehead with diagnosis of shoulder pain, and pend a referral with the above info on it and I can sign it

## 2022-11-16 LAB — TOTAL SYPHILLIS IGG/IGM: NORMAL

## 2022-11-18 LAB — MISCELLANEOUS LAB TEST ORDER: NORMAL

## 2022-12-13 ENCOUNTER — OFFICE VISIT (OUTPATIENT)
Dept: ORTHOPEDIC SURGERY | Age: 27
End: 2022-12-13
Payer: COMMERCIAL

## 2022-12-13 VITALS — WEIGHT: 200 LBS | BODY MASS INDEX: 37.76 KG/M2 | HEIGHT: 61 IN

## 2022-12-13 DIAGNOSIS — M75.52 SUBACROMIAL BURSITIS OF LEFT SHOULDER JOINT: Primary | ICD-10-CM

## 2022-12-13 PROCEDURE — 99214 OFFICE O/P EST MOD 30 MIN: CPT | Performed by: PHYSICIAN ASSISTANT

## 2022-12-13 RX ORDER — DICLOFENAC SODIUM 75 MG/1
75 TABLET, DELAYED RELEASE ORAL 2 TIMES DAILY
Qty: 60 TABLET | Refills: 0 | Status: SHIPPED | OUTPATIENT
Start: 2022-12-13 | End: 2023-01-12

## 2022-12-19 ENCOUNTER — HOSPITAL ENCOUNTER (OUTPATIENT)
Dept: PHYSICAL THERAPY | Age: 27
Setting detail: THERAPIES SERIES
Discharge: HOME | End: 2022-12-19
Payer: COMMERCIAL

## 2022-12-19 PROCEDURE — 97112 NEUROMUSCULAR REEDUCATION: CPT

## 2022-12-19 PROCEDURE — 97161 PT EVAL LOW COMPLEX 20 MIN: CPT

## 2022-12-19 PROCEDURE — 97110 THERAPEUTIC EXERCISES: CPT

## 2022-12-19 NOTE — PLAN OF CARE
[facility], [address] [facility] [place], [address]  Phone: [phone]   Fax:     [phone]                                                       Physical Therapy Certification    Dear Jorge Dover ,    We had the pleasure of evaluating the following patient for physical therapy services at [address] [place] [address]. A summary of our findings can be found in the initial assessment below. This includes our plan of care. If you have any questions or concerns regarding these findings, please do not hesitate to contact me at the office phone number checked above. Thank you for the referral.       Physician Signature:_______________________________Date:__________________  By signing above (or electronic signature), therapists plan is approved by physician      Patient: Radha Somers   : 1995   MRN: [id number]  Referring Physician:        Evaluation Date: 2022      Medical Diagnosis Information:  Diagnosis: Subacromial bursitis of left shoulder joint M75.52   Treatment Diagnosis: Left shoulder pain M25. 512                                         Insurance information: PT Insurance Information: BCBS    Precautions/ Contra-indications: L CT release 2022    C-SSRS Triggered by Intake questionnaire (Past 2 wk assessment):   [x] No, Questionnaire did not trigger screening.   [] Yes, Patient intake triggered further evaluation      [] C-SSRS Screening completed  [] PCP notified via Plan of Care  [] Emergency services notified     Latex Allergy:  [x]NO      []YES  Preferred Language for Healthcare:   [x]English       []other:    SUBJECTIVE: Patient stated complaint: Approx 1 month history of left anterior/lateral and posterior shoulder pain gradual increase in symptoms. Pt RHD. Stated taking Voltaren which has helped some. Stated these symptoms are on and off but keep flaring up. Stated since Sept/Oct last year then got CT surgery and this helped until about last month tenderness in left upper arm. Stated one weekend burning/seering pain down arm. Stated did have shoulder XR. Stated RA/OA/MS does run in her family. Stated does have Hashimotos. Relevant Medical History:Hashimotos  FOTO Score: QDASH 23%    Pain Scale: 2-6/10  Easing factors: rest  Provocative factors: forward flexion/abd, sometimes push/pulling, weight bearing    Type: [x]Constant   [x]Intermittent  []Radiating []Localized []other:     Numbness/Tingling: some in hand (hx CT release)    Occupation/School: Desk work    Living Status/Prior Level of Function: Independent with ADLs and IADLs. OBJECTIVE:     CERV ROM     Cervical Flexion 60 pulling   Cervical Extension 50 relief   Cervical SB 45 45   Cervical rotation 100% 100%        ROM Left Right   Shoulder Flex 165 p! 170   Shoulder Abd     Shoulder ER To C1-2 To C4-5   Shoulder IR To L1-2 To T10-12                  Strength  Left Right   Shoulder Flex 4/5 4+/5   Shoulder Scap 4/5 4+/5   Shoulder ER 4/5 4+/5   Shoulder IR 4/5 4+/5               Reflexes/Sensation:    [x]Dermatomes/Myotomes intact    [x]Reflexes equal and normal bilaterally   []Other:    Joint mobility:     []Normal    []Hypo   []Hyper    Palpation: TTP anterior shoulder, 1st rib/scalenes on L    Functional Mobility/Transfers: I with transfers    Posture: rounded shoulders    Bandages/Dressings/Incisions: NA    Gait: (include devices/WB status): WNL     Orthopedic Special Tests:                         [x] Patient history, allergies, meds reviewed. Medical chart reviewed. See intake form. Review Of Systems (ROS):  [x]Performed Review of systems (Integumentary, CardioPulmonary, Neurological) by intake and observation. Intake form has been scanned into medical record. Patient has been instructed to contact their primary care physician regarding ROS issues if not already being addressed at this time. Co-morbidities/Complexities (which will affect course of rehabilitation):    []None           Arthritic conditions   []Rheumatoid arthritis (M05.9)  []Osteoarthritis (M19.91)   Cardiovascular conditions   []Hypertension (I10)  []Hyperlipidemia (E78.5)  []Angina pectoris (I20)  []Atherosclerosis (I70)   Musculoskeletal conditions   []Disc pathology   []Congenital spine pathologies   []Prior surgical intervention  []Osteoporosis (M81.8)  []Osteopenia (M85.8)   Endocrine conditions   []Hypothyroid (E03.9)  []Hyperthyroid Gastrointestinal conditions   []Constipation (J45.98)   Metabolic conditions   []Morbid obesity (E66.01)  []Diabetes type 1(E10.65) or 2 (E11.65)   []Neuropathy (G60.9)     Pulmonary conditions   []Asthma (J45)  []Coughing   []COPD (J44.9)   Psychological Disorders  []Anxiety (F41.9)  []Depression (F32.9)   []Other:   [x]Other:     Hashimotos     Barriers to/and or personal factors that will affect rehab potential:              [x]Age  []Sex              []Motivation/Lack of Motivation                        [x]Co-Morbidities              []Cognitive Function, education/learning barriers              []Environmental, home barriers              [x]profession/work barriers  []past PT/medical experience  []other:  Justification:       Falls Risk Assessment (30 days):    [x] Falls Risk assessed and no intervention required.   [] Falls Risk assessed and Patient requires intervention due to being higher risk   TUG score (>12s at risk):     [] Falls education provided, including       G-Codes:       ASSESSMENT:    Functional Impairments   []Noted spinal or UE joint hypomobility   []Noted spinal or UE joint hypermobility   [x]Decreased UE functional ROM   [x]Decreased UE functional strength   []Abnormal reflexes/sensation/myotomal/dermatomal deficits   [x]Decreased RC/scapular/core strength and neuromuscular control   []other:      Functional Activity Limitations (from functional questionnaire and intake)   [x]Reduced ability to tolerate prolonged functional positions   [x]Reduced ability or difficulty with changes of positions or transfers between positions   [x]Reduced ability to maintain good posture and demonstrate good body mechanics with sitting, bending, and lifting   [x] Reduced ability or tolerance with driving and/or computer work   [x]Reduced ability to sleep   [x]Reduced ability to perform lifting, reaching, carrying tasks   [x]Reduced ability to tolerate impact through UE   [x]Reduced ability to reach behind back   [x]Reduced ability to  or hold objects   [x]Reduced ability to throw or toss an object   []other:    Participation Restrictions   [x]Reduced participation in self care activities   [x]Reduced participation in home management activities   []Reduced participation in work activities   []Reduced participation in social activities. []Reduced participation in sport/recreation activities. Classification:   []Signs/symptoms consistent with post-surgical status including decreased ROM, strength and function.   [x]Signs/symptoms consistent with joint sprain/strain   []Signs/symptoms consistent with shoulder impingement   []Signs/symptoms consistent with shoulder/elbow/wrist tendinopathy   []Signs/symptoms consistent with Rotator cuff tear   []Signs/symptoms consistent with labral tear   [x]Signs/symptoms consistent with postural dysfunction    []Signs/symptoms consistent with Glenohumeral IR Deficit - <45 degrees   []Signs/symptoms consistent with facet dysfunction of cervical/thoracic spine    []Signs/symptoms consistent with pathology which may benefit from Dry needling     []other:     Prognosis/Rehab Potential:      []Excellent   [x]Good    []Fair   []Poor    Tolerance of evaluation/treatment:    []Excellent   [x]Good    []Fair   []Poor    Physical Therapy Evaluation Complexity Justification  [x] A history of present problem with:  [] no personal factors and/or comorbidities that impact the plan of care;  [x]1-2 personal factors and/or comorbidities that impact the plan of care  []3 personal factors and/or comorbidities that impact the plan of care  [x] An examination of body systems using standardized tests and measures addressing any of the following: body structures and functions (impairments), activity limitations, and/or participation restrictions;:  [] a total of 1-2 or more elements   [] a total of 3 or more elements   [x] a total of 4 or more elements   [x] A clinical presentation with:  [x] stable and/or uncomplicated characteristics   [] evolving clinical presentation with changing characteristics  [] unstable and unpredictable characteristics;   [x] Clinical decision making of [x] low, [] moderate, [] high complexity using standardized patient assessment instrument and/or measurable assessment of functional outcome. [x] EVAL (LOW) 56012 (typically 20 minutes face-to-face)  [] EVAL (MOD) 44068 (typically 30 minutes face-to-face)  [] EVAL (HIGH) 50261 (typically 45 minutes face-to-face)  [] RE-EVAL     PLAN:  Frequency/Duration:  1-2 days per week for 12 Weeks:  INTERVENTIONS:  [x] Therapeutic exercise including: strength training, ROM, for Upper extremity and core   [x]  NMR activation and proprioception for UE, scap and Core   [x] Manual therapy as indicated for shoulder, scapula and spine to include: Dry Needling/IASTM, STM, PROM, Gr I-IV mobilizations, manipulation. [x] Modalities as needed that may include: thermal agents, E-stim, Biofeedback, US, iontophoresis as indicated  [x] Patient education on joint protection, postural re-education, activity modification, progression of HEP. HEP instruction: Refer to [address] [place] [address] access code and exercises on the 1st visit treatment note    GOALS:  Patient stated goal: Less pain. Therapist goals for Patient:   Short Term Goals: To be achieved in: 2 weeks  1.  Independent in HEP and progression per patient tolerance, in order to prevent re-injury. [] Progressing: [] Met: [] Not Met: [] Adjusted     2. Patient will have a decrease in pain to facilitate improvement in movement, function, and ADLs as indicated by Functional Deficits. [] Progressing: [] Met: [] Not Met: [] Adjusted     Long Term Goals: To be achieved in: 12 weeks  1. FOTO score will match or exceed predicted score to assist with reaching prior level of function. [] Progressing: [] Met: [] Not Met: [] Adjusted     2. Patient will demonstrate increased AROM to 170 active shoulder elevation to allow for proper joint functioning as indicated by patients Functional Deficits. [] Progressing: [] Met: [] Not Met: [] Adjusted     3. Patient will demonstrate an increase in Strength to 5/5 to allow for proper functional mobility as indicated by patients Functional Deficits. [] Progressing: [] Met: [] Not Met: [] Adjusted     4. 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 [] Progressing: [] Met: [] Not Met: [] Adjusted     5.  Patient will report 80-90% subjective decrease in pain/symptoms (patient specific functional goal)    [] Progressing: [] Met: [] Not Met: [] Adjusted      Electronically signed by:  Bryan Viveros PT

## 2022-12-19 NOTE — FLOWSHEET NOTE
[facility], [address] [place] [address] [address]  Phone: [phone]   Fax:     [phone]      Physical Therapy Treatment Note/ Progress Report:     Date:  2022    Patient Name:  Mary Lozada    :  1995  MRN: [id number]  Restrictions/Precautions:    Medical/Treatment Diagnosis Information:  Diagnosis: Subacromial bursitis of left shoulder joint M75.52  Treatment Diagnosis: Left shoulder pain M25. 141  Insurance/Certification information:  PT Insurance Information: Aicha Diaz  Physician Information:   Janice Neville  Has the plan of care been signed (Y/N):        []  Yes  [x]  No     Date of Patient follow up with Physician: after PT    Is this a Progress Report:     []  Yes  [x]  No     If Yes:  Date Range for reporting period:  Beginnin22 ------------ Endin23    Progress report will be due (10 Rx or 30 days whichever is less):      Recertification will be due (POC Duration  / 90 days whichever is less): 3/19/23      Visit # Insurance Allowable Auth Required   In Person 1 Check auth []  Yes     []  No    Tele Health 0  []  Yes     []  No    Total 1       FOTO Score: QDASH 23%     Date assessed:  22      Latex Allergy:  [x]NO      []YES  Preferred Language for Healthcare:   [x]English       []other:    Pain level:  2-6/10     SUBJECTIVE:  See eval    OBJECTIVE: See eval  Observation:   Test measurements:      RESTRICTIONS/PRECAUTIONS: Hashimotos    Exercises/Interventions:   Therapeutic Ex (89594) Sets/sec Reps Notes/CUES HEP   Pec corner stretch  10 vc    TB ext  10 Green, vc    TB row  10 vc    TB ER no $  10 vc    TB ER  10 vc    TB IR  10 vc                                              Manual Intervention (66013)       STM, 1st rib mobs R grade I/II 5 min  Posture, desk ergonomics, HEP with gradual progression, goals of PT, not to push through pain with ex or activity- pt stated understanding NMR re-education (43421)   CUES NEEDED                                                                   Therapeutic Activity (54611)                                          Andela access code:  [email or web address]           Therapeutic Exercise and NMR EXR  [x] (07883) Provided verbal/tactile cueing for activities related to strengthening, flexibility, endurance, ROM  for improvements in scapular, scapulothoracic and UE control with self care, reaching, carrying, lifting, house/yardwork, driving/computer work. [x] (68989) Provided verbal/tactile cueing for activities related to improving balance, coordination, kinesthetic sense, posture, motor skill, proprioception  to assist with  scapular, scapulothoracic and UE control with self care, reaching, carrying, lifting, house/yardwork, driving/computer work. Therapeutic Activities:    [x] (83824 or 85632) Provided verbal/tactile cueing for activities related to improving balance, coordination, kinesthetic sense, posture, motor skill, proprioception and motor activation to allow for proper function of scapular, scapulothoracic and UE control with self care, carrying, lifting, driving/computer work.      Home Exercise Program:    [x] (42375) Reviewed/Progressed HEP activities related to strengthening, flexibility, endurance, ROM of scapular, scapulothoracic and UE control with self care, reaching, carrying, lifting, house/yardwork, driving/computer work  [] (15312) Reviewed/Progressed HEP activities related to improving balance, coordination, kinesthetic sense, posture, motor skill, proprioception of scapular, scapulothoracic and UE control with self care, reaching, carrying, lifting, house/yardwork, driving/computer work      Manual Treatments:  PROM / STM / Oscillations-Mobs:  G-I, II, III, IV (PA's, Inf., Post.)  [x] (04571) Provided manual therapy to mobilize soft tissue/joints of cervical/CT, scapular GHJ and UE for the purpose of Functional Deficits. [] Progressing: [] Met: [] Not Met: [] Adjusted     4. 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 [] Progressing: [] Met: [] Not Met: [] Adjusted     5. Patient will report 80-90% subjective decrease in pain/symptoms (patient specific functional goal)    [] Progressing: [] Met: [] Not Met: [] Adjusted         Overall Progression Towards Functional goals/ Treatment Progress Update:  [] Patient is progressing as expected towards functional goals listed. [] Progression is slowed due to complexities/Impairments listed. [] Progression has been slowed due to co-morbidities. [x] Plan just implemented, too soon to assess goals progression <30days   [] Goals require adjustment due to lack of progress  [] Patient is not progressing as expected and requires additional follow up with physician  [] Other    Prognosis for POC: [x] Good [] Fair  [] Poor      Patient requires continued skilled intervention: [x] Yes  [] No    Treatment/Activity Tolerance:  [x] Patient able to complete treatment  [] Patient limited by fatigue  [] Patient limited by pain    [] Patient limited by other medical complications  [] Other:       PLAN: See eval  [] Continue per plan of care [] Alter current plan (see comments above)  [x] Plan of care initiated [] Hold pending MD visit [] Discharge    Electronically signed by:  Carin Olivier PT    Note: If patient does not return for scheduled/ recommended follow up visits, this note will serve as a discharge from care along with most recent update on progress.

## 2022-12-28 ENCOUNTER — HOSPITAL ENCOUNTER (OUTPATIENT)
Dept: PHYSICAL THERAPY | Age: 27
Setting detail: THERAPIES SERIES
Discharge: HOME | End: 2022-12-28
Payer: COMMERCIAL

## 2022-12-28 PROCEDURE — 20560 NDL INSJ W/O NJX 1 OR 2 MUSC: CPT

## 2022-12-28 PROCEDURE — 97140 MANUAL THERAPY 1/> REGIONS: CPT

## 2022-12-28 PROCEDURE — 97110 THERAPEUTIC EXERCISES: CPT

## 2022-12-28 NOTE — FLOWSHEET NOTE
[address] [facility], [address] [place] [address], [address] [place] [address] [address]  Phone: [phone]   Fax:     [phone]      Physical Therapy Treatment Note/ Progress Report:     Date:  2022    Patient Name:  Manolo Quiles    :  1995  MRN: [id number]  Restrictions/Precautions:    Medical/Treatment Diagnosis Information:  Diagnosis: Subacromial bursitis of left shoulder joint M75.52  Treatment Diagnosis: Left shoulder pain M25. 120  Insurance/Certification information:  PT Insurance Information: Charlie Mackenzie  Physician Information:   Radha Moreira  Has the plan of care been signed (Y/N):        []  Yes  [x]  No     Date of Patient follow up with Physician: after PT    Is this a Progress Report:     []  Yes  [x]  No     If Yes:  Date Range for reporting period:  Beginnin22 ------------ Endin23    Progress report will be due (10 Rx or 30 days whichever is less):      Recertification will be due (POC Duration  / 90 days whichever is less): 3/19/23      Visit # Insurance Allowable Auth Required   In Person 2 30 []  Yes     []  No    Tele Health 0  []  Yes     []  No    Total 2       FOTO Score: QDASH 23%     Date assessed:  22      Latex Allergy:  [x]NO      []YES  Preferred Language for Healthcare:   [x]English       []other:    Pain level:  2-6/10     SUBJECTIVE:  Pt stated has done ex a few times.     OBJECTIVE: See eval  Observation:   Test measurements:      RESTRICTIONS/PRECAUTIONS: Hashimotos    Exercises/Interventions:   Therapeutic Ex (40611) Sets/sec Reps Notes/CUES HEP   Pec corner stretch  10 vc    TB ext 2 10 Green, vc    TB row 2 10 vc    TB ER no $ 2 10 vc    TB ER 2 10 vc    TB IR 2 10 vc                                              Manual Intervention (13798)       STM L UT, scap mm, 1st rib mobs L grade I/II    Dn  15 min      5 min    Posture, desk ergonomics, HEP with gradual progression, goals of PT, not to push through pain with ex or activity- pt stated understanding                                       NMR re-education (74964)   CUES NEEDED                                                                   Therapeutic Activity (38215)                                          [employer] access code:  [email or web address]           Therapeutic Exercise and NMR EXR  [x] (75692) Provided verbal/tactile cueing for activities related to strengthening, flexibility, endurance, ROM  for improvements in scapular, scapulothoracic and UE control with self care, reaching, carrying, lifting, house/yardwork, driving/computer work. [x] (46897) Provided verbal/tactile cueing for activities related to improving balance, coordination, kinesthetic sense, posture, motor skill, proprioception  to assist with  scapular, scapulothoracic and UE control with self care, reaching, carrying, lifting, house/yardwork, driving/computer work. Therapeutic Activities:    [x] (77984 or 62034) Provided verbal/tactile cueing for activities related to improving balance, coordination, kinesthetic sense, posture, motor skill, proprioception and motor activation to allow for proper function of scapular, scapulothoracic and UE control with self care, carrying, lifting, driving/computer work.      Home Exercise Program:    [x] (00170) Reviewed/Progressed HEP activities related to strengthening, flexibility, endurance, ROM of scapular, scapulothoracic and UE control with self care, reaching, carrying, lifting, house/yardwork, driving/computer work  [] (19853) Reviewed/Progressed HEP activities related to improving balance, coordination, kinesthetic sense, posture, motor skill, proprioception of scapular, scapulothoracic and UE control with self care, reaching, carrying, lifting, house/yardwork, driving/computer work      Manual Treatments:  PROM / STM / Oscillations-Mobs:  G-I, II, III, IV (PA's, Inf., Post.)  [x] (35779) Provided manual therapy to mobilize soft tissue/joints of cervical/CT, scapular GHJ and UE for the purpose of modulating pain, promoting relaxation,  increasing ROM, reducing/eliminating soft tissue swelling/inflammation/restriction, improving soft tissue extensibility and allowing for proper ROM for normal function with self care, reaching, carrying, lifting, house/yardwork, driving/computer work    Modalities:     [] GAME READY (VASO)- for significant edema, swelling, pain control. Dry needling manual therapy: consisted on the placement of 6 needles in the following muscles:  L UT, L scap mm pull away technique,. A 30-40 mm needle was inserted, piston, rotated, and coned to produce intramuscular mobilization. These techniques were used to restore functional range of motion, reduce muscle spasm and induce healing in the corresponding musculature. (12374)  Clean Technique was utilized today while applying Dry needling treatment. The treatment sites where cleaned with 70% solution of  isopropyl alcohol . The PT washed their hands and utilized treatment gloves along with hand  prior to inserting the needles. All needles where removed and discarded in the appropriate sharps container. Charges:  Timed Code Treatment Minutes: 30   Total Treatment Minutes:  35   BWC:  TE TIME:  NMR TIME:  MANUAL TIME:  UNTIMED MINUTES:  Medicare Total:                 [] EVAL (LOW) 06449 (typically 20 minutes face-to-face)  [] EVAL (MOD) 99852 (typically 30 minutes face-to-face)  [] EVAL (HIGH) 29829 (typically 45 minutes face-to-face)  [] RE-EVAL     [x] DT(60727) x     [] IONTO  [] NMR (88594) x     [] VASO  [x] Manual (64307) x     [x] Other: dn  [] TA x      [] Mech Traction (74306)  [] ES(attended) (62088)      [] ES (un) (61635):    ASSESSMENT:  Good tolerance of manual therapy and dn, no increased sx no adverse events noted. Pt stated some relief at conclusion with decreased scap pain with active shoulder elevation.  Educated on use of heat and stretches to reduce mm soreness. GOALS:     Patient stated goal: Less pain. Therapist goals for Patient:   Short Term Goals: To be achieved in: 2 weeks  1. Independent in HEP and progression per patient tolerance, in order to prevent re-injury. [] Progressing: [] Met: [] Not Met: [] Adjusted     2. Patient will have a decrease in pain to facilitate improvement in movement, function, and ADLs as indicated by Functional Deficits. [] Progressing: [] Met: [] Not Met: [] Adjusted     Long Term Goals: To be achieved in: 12 weeks  1. FOTO score will match or exceed predicted score to assist with reaching prior level of function. [] Progressing: [] Met: [] Not Met: [] Adjusted     2. Patient will demonstrate increased AROM to 170 active shoulder elevation to allow for proper joint functioning as indicated by patients Functional Deficits. [] Progressing: [] Met: [] Not Met: [] Adjusted     3. Patient will demonstrate an increase in Strength to 5/5 to allow for proper functional mobility as indicated by patients Functional Deficits. [] Progressing: [] Met: [] Not Met: [] Adjusted     4. 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 [] Progressing: [] Met: [] Not Met: [] Adjusted     5. Patient will report 80-90% subjective decrease in pain/symptoms (patient specific functional goal)    [] Progressing: [] Met: [] Not Met: [] Adjusted         Overall Progression Towards Functional goals/ Treatment Progress Update:  [] Patient is progressing as expected towards functional goals listed. [] Progression is slowed due to complexities/Impairments listed. [] Progression has been slowed due to co-morbidities.   [x] Plan just implemented, too soon to assess goals progression <30days   [] Goals require adjustment due to lack of progress  [] Patient is not progressing as expected and requires additional follow up with physician  [] Other    Prognosis for POC: [x] Good [] Fair  [] Poor      Patient requires continued skilled intervention: [x] Yes  [] No    Treatment/Activity Tolerance:  [x] Patient able to complete treatment  [] Patient limited by fatigue  [] Patient limited by pain    [] Patient limited by other medical complications  [] Other:       PLAN: See eval  [x] Continue per plan of care [] Alter current plan (see comments above)  [] Plan of care initiated [] Hold pending MD visit [] Discharge    Electronically signed by:  Jaymie Dwan PT    Note: If patient does not return for scheduled/ recommended follow up visits, this note will serve as a discharge from care along with most recent update on progress.

## 2023-01-02 DIAGNOSIS — E03.9 ACQUIRED HYPOTHYROIDISM: ICD-10-CM

## 2023-01-03 RX ORDER — LEVOTHYROXINE SODIUM 88 UG/1
TABLET ORAL
Qty: 90 TABLET | Refills: 0 | Status: SHIPPED | OUTPATIENT
Start: 2023-01-03 | End: 2023-02-01

## 2023-01-04 ENCOUNTER — HOSPITAL ENCOUNTER (OUTPATIENT)
Dept: PHYSICAL THERAPY | Age: 28
Setting detail: THERAPIES SERIES
Discharge: HOME | End: 2023-01-04
Payer: COMMERCIAL

## 2023-01-04 PROCEDURE — 20560 NDL INSJ W/O NJX 1 OR 2 MUSC: CPT

## 2023-01-04 PROCEDURE — 97140 MANUAL THERAPY 1/> REGIONS: CPT

## 2023-01-04 PROCEDURE — 97110 THERAPEUTIC EXERCISES: CPT

## 2023-01-04 NOTE — FLOWSHEET NOTE
[address]23 [facility], [address] [place] [address], [address] [place] [address] [address]  Phone: [phone]   Fax:     [phone]      Physical Therapy Treatment Note/ Progress Report:     Date:  2023    Patient Name:  Jennie Gonzalez    :  1995  MRN: [id number]  Restrictions/Precautions:    Medical/Treatment Diagnosis Information:  Diagnosis: Subacromial bursitis of left shoulder joint M75.52  Treatment Diagnosis: Left shoulder pain M25. 199  Insurance/Certification information:  PT Insurance Information: Viviana Chaudhry  Physician Information:   Shabnam Driver  Has the plan of care been signed (Y/N):        []  Yes  [x]  No     Date of Patient follow up with Physician: after PT    Is this a Progress Report:     []  Yes  [x]  No     If Yes:  Date Range for reporting period:  Beginnin22 ------------ Endin23    Progress report will be due (10 Rx or 30 days whichever is less):      Recertification will be due (POC Duration  / 90 days whichever is less): 3/19/23      Visit # Insurance Allowable Auth Required   In Person 3 30 []  Yes     []  No    Tele Health 0  []  Yes     []  No    Total 3       FOTO Score: QDASH 23%     Date assessed:  22      Latex Allergy:  [x]NO      []YES  Preferred Language for Healthcare:   [x]English       []other:    Pain level:  2-4/10     SUBJECTIVE:  Pt stated felt relief following dn LV, didn't get sore again until couple days ago.     OBJECTIVE: See eval  Observation:   Test measurements:      RESTRICTIONS/PRECAUTIONS: Hashimotos    Exercises/Interventions:   Therapeutic Ex (24710) Sets/sec Reps Notes/CUES HEP   Pec corner stretch  10 vc    TB ext 2 10 Blue, vc    TB row 2 10 vc    TB ER no $ 2 10 vc    TB ER 2 10 vc    TB IR 2 10 vc    Bridge  10 vc    Standing hip ext  10ea vc                                Manual Intervention (15844)       STM L UT, scap mm, 1st rib mobs L grade I/II    Dn  15 min      5 min Posture, desk ergonomics, HEP with gradual progression, goals of PT, not to push through pain with ex or activity- pt stated understanding                                       NMR re-education (51417)   CUES NEEDED                                                                   Therapeutic Activity (99748)                                          [employer] access code:  [email or web address]           Therapeutic Exercise and NMR EXR  [x] (49157) Provided verbal/tactile cueing for activities related to strengthening, flexibility, endurance, ROM  for improvements in scapular, scapulothoracic and UE control with self care, reaching, carrying, lifting, house/yardwork, driving/computer work. [x] (42286) Provided verbal/tactile cueing for activities related to improving balance, coordination, kinesthetic sense, posture, motor skill, proprioception  to assist with  scapular, scapulothoracic and UE control with self care, reaching, carrying, lifting, house/yardwork, driving/computer work. Therapeutic Activities:    [x] (50325 or 22396) Provided verbal/tactile cueing for activities related to improving balance, coordination, kinesthetic sense, posture, motor skill, proprioception and motor activation to allow for proper function of scapular, scapulothoracic and UE control with self care, carrying, lifting, driving/computer work.      Home Exercise Program:    [x] (66505) Reviewed/Progressed HEP activities related to strengthening, flexibility, endurance, ROM of scapular, scapulothoracic and UE control with self care, reaching, carrying, lifting, house/yardwork, driving/computer work  [] (31933) Reviewed/Progressed HEP activities related to improving balance, coordination, kinesthetic sense, posture, motor skill, proprioception of scapular, scapulothoracic and UE control with self care, reaching, carrying, lifting, house/yardwork, driving/computer work      Manual Treatments:  PROM / STM / Oscillations-Mobs:  G-I, II, III, IV (PA's, Inf., Post.)  [x] (61657) Provided manual therapy to mobilize soft tissue/joints of cervical/CT, scapular GHJ and UE for the purpose of modulating pain, promoting relaxation,  increasing ROM, reducing/eliminating soft tissue swelling/inflammation/restriction, improving soft tissue extensibility and allowing for proper ROM for normal function with self care, reaching, carrying, lifting, house/yardwork, driving/computer work    Modalities:     [] GAME READY (VASO)- for significant edema, swelling, pain control. Dry needling manual therapy: consisted on the placement of 6 needles in the following muscles:  L UT, L scap mm pull away technique,. A 30-40 mm needle was inserted, piston, rotated, and coned to produce intramuscular mobilization. These techniques were used to restore functional range of motion, reduce muscle spasm and induce healing in the corresponding musculature. (59797)  Clean Technique was utilized today while applying Dry needling treatment. The treatment sites where cleaned with 70% solution of  isopropyl alcohol . The PT washed their hands and utilized treatment gloves along with hand  prior to inserting the needles. All needles where removed and discarded in the appropriate sharps container. Charges:  Timed Code Treatment Minutes: 30   Total Treatment Minutes:  35   BWC:  TE TIME:  NMR TIME:  MANUAL TIME:  UNTIMED MINUTES:  Medicare Total:                 [] EVAL (LOW) 97725 (typically 20 minutes face-to-face)  [] EVAL (MOD) 82073 (typically 30 minutes face-to-face)  [] EVAL (HIGH) 88345 (typically 45 minutes face-to-face)  [] RE-EVAL     [x] NA(61236) x     [] IONTO  [] NMR (42799) x     [] VASO  [x] Manual (43978) x     [x] Other: dn  [] TA x      [] Mech Traction (12342)  [] ES(attended) (24566)      [] ES (un) (07775):    ASSESSMENT:  Good tolerance of manual therapy and dn, no increased sx no adverse events noted.  Pt stated some relief at conclusion with decreased scap pain with active shoulder elevation. Educated on use of heat and stretches to reduce mm soreness. GOALS:     Patient stated goal: Less pain. Therapist goals for Patient:   Short Term Goals: To be achieved in: 2 weeks  1. Independent in HEP and progression per patient tolerance, in order to prevent re-injury. [] Progressing: [] Met: [] Not Met: [] Adjusted     2. Patient will have a decrease in pain to facilitate improvement in movement, function, and ADLs as indicated by Functional Deficits. [] Progressing: [] Met: [] Not Met: [] Adjusted     Long Term Goals: To be achieved in: 12 weeks  1. FOTO score will match or exceed predicted score to assist with reaching prior level of function. [] Progressing: [] Met: [] Not Met: [] Adjusted     2. Patient will demonstrate increased AROM to 170 active shoulder elevation to allow for proper joint functioning as indicated by patients Functional Deficits. [] Progressing: [] Met: [] Not Met: [] Adjusted     3. Patient will demonstrate an increase in Strength to 5/5 to allow for proper functional mobility as indicated by patients Functional Deficits. [] Progressing: [] Met: [] Not Met: [] Adjusted     4. 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 [] Progressing: [] Met: [] Not Met: [] Adjusted     5. Patient will report 80-90% subjective decrease in pain/symptoms (patient specific functional goal)    [] Progressing: [] Met: [] Not Met: [] Adjusted         Overall Progression Towards Functional goals/ Treatment Progress Update:  [] Patient is progressing as expected towards functional goals listed. [] Progression is slowed due to complexities/Impairments listed. [] Progression has been slowed due to co-morbidities.   [x] Plan just implemented, too soon to assess goals progression <30days   [] Goals require adjustment due to lack of progress  [] Patient is not progressing as expected and requires additional follow up with physician  [] Other    Prognosis for POC: [x] Good [] Fair  [] Poor      Patient requires continued skilled intervention: [x] Yes  [] No    Treatment/Activity Tolerance:  [x] Patient able to complete treatment  [] Patient limited by fatigue  [] Patient limited by pain    [] Patient limited by other medical complications  [] Other:       PLAN: See eval  [x] Continue per plan of care [] Alter current plan (see comments above)  [] Plan of care initiated [] Hold pending MD visit [] Discharge    Electronically signed by:  Luiz Qiu PT    Note: If patient does not return for scheduled/ recommended follow up visits, this note will serve as a discharge from care along with most recent update on progress.

## 2023-01-09 ENCOUNTER — HOSPITAL ENCOUNTER (OUTPATIENT)
Dept: PHYSICAL THERAPY | Age: 28
Setting detail: THERAPIES SERIES
Discharge: HOME | End: 2023-01-09
Payer: COMMERCIAL

## 2023-01-09 PROCEDURE — 20560 NDL INSJ W/O NJX 1 OR 2 MUSC: CPT

## 2023-01-09 PROCEDURE — 97110 THERAPEUTIC EXERCISES: CPT

## 2023-01-09 PROCEDURE — 97140 MANUAL THERAPY 1/> REGIONS: CPT

## 2023-01-09 NOTE — FLOWSHEET NOTE
[facility], [address] [place] [address] [address]  Phone: [phone]   Fax:     [phone]      Physical Therapy Treatment Note/ Progress Report:     Date:  2023    Patient Name:  Joycelyn Kendrick    :  1995  MRN: [id number]  Restrictions/Precautions:    Medical/Treatment Diagnosis Information:  Diagnosis: Subacromial bursitis of left shoulder joint M75.52  Treatment Diagnosis: Left shoulder pain M25. 682  Insurance/Certification information:  PT Insurance Information: [facility]  Physician Information:   José Manuel Espino  Has the plan of care been signed (Y/N):        []  Yes  [x]  No     Date of Patient follow up with Physician: after PT    Is this a Progress Report:     []  Yes  [x]  No     If Yes:  Date Range for reporting period:  Beginnin22 ------------ Endin23    Progress report will be due (10 Rx or 30 days whichever is less):      Recertification will be due (POC Duration  / 90 days whichever is less): 3/19/23      Visit # Insurance Allowable Auth Required   In Person 2   30 []  Yes     []  No    Tele Health 0  []  Yes     []  No    Total 4       FOTO Score: QDASH 23%     Date assessed:  22      Latex Allergy:  [x]NO      []YES  Preferred Language for Healthcare:   [x]English       []other:    Pain level:  2-4/10     SUBJECTIVE:       OBJECTIVE: See eval  Observation:   Test measurements:      RESTRICTIONS/PRECAUTIONS: Hashimotos    Exercises/Interventions:   Therapeutic Ex (64790) Sets/sec Reps Notes/CUES HEP   Pec corner stretch  10 vc    TB ext 2 10 Blue, vc    TB row 2 10 vc    TB ER no $ 2 10 vc    TB ER 2 10 vc    TB IR 2 10 vc    Bridge 2 10 vc    Standing hip ext 2 10ea vc    Prone I, T, W Y 2 10ea vc                         Manual Intervention (09504)       STM L UT, scap mm, 1st rib mobs L grade I/II    Dn  15 min      5 min    Posture, desk ergonomics, HEP with gradual progression, goals of PT, not to push through pain with ex or activity- pt stated understanding                                       NMR re-education (57111)   CUES NEEDED                                                                   Therapeutic Activity (10553)                                          [employer] access code:  [email or web address]           Therapeutic Exercise and NMR EXR  [x] (03334) Provided verbal/tactile cueing for activities related to strengthening, flexibility, endurance, ROM  for improvements in scapular, scapulothoracic and UE control with self care, reaching, carrying, lifting, house/yardwork, driving/computer work. [x] (74553) Provided verbal/tactile cueing for activities related to improving balance, coordination, kinesthetic sense, posture, motor skill, proprioception  to assist with  scapular, scapulothoracic and UE control with self care, reaching, carrying, lifting, house/yardwork, driving/computer work. Therapeutic Activities:    [x] (61751 or 98550) Provided verbal/tactile cueing for activities related to improving balance, coordination, kinesthetic sense, posture, motor skill, proprioception and motor activation to allow for proper function of scapular, scapulothoracic and UE control with self care, carrying, lifting, driving/computer work.      Home Exercise Program:    [x] (69274) Reviewed/Progressed HEP activities related to strengthening, flexibility, endurance, ROM of scapular, scapulothoracic and UE control with self care, reaching, carrying, lifting, house/yardwork, driving/computer work  [] (89971) Reviewed/Progressed HEP activities related to improving balance, coordination, kinesthetic sense, posture, motor skill, proprioception of scapular, scapulothoracic and UE control with self care, reaching, carrying, lifting, house/yardwork, driving/computer work      Manual Treatments:  PROM / STM / Oscillations-Mobs:  G-I, II, III, IV (PA's, Inf., Post.)  [x] (93390) Provided manual therapy to mobilize soft tissue/joints of cervical/CT, scapular GHJ and UE for the purpose of modulating pain, promoting relaxation,  increasing ROM, reducing/eliminating soft tissue swelling/inflammation/restriction, improving soft tissue extensibility and allowing for proper ROM for normal function with self care, reaching, carrying, lifting, house/yardwork, driving/computer work    Modalities:     [] GAME READY (VASO)- for significant edema, swelling, pain control. Dry needling manual therapy: consisted on the placement of 8 needles in the following muscles:  L UT, L scap mm pull away technique,. A 30-40 mm needle was inserted, piston, rotated, and coned to produce intramuscular mobilization. These techniques were used to restore functional range of motion, reduce muscle spasm and induce healing in the corresponding musculature. (87169)  Clean Technique was utilized today while applying Dry needling treatment. The treatment sites where cleaned with 70% solution of  isopropyl alcohol . The PT washed their hands and utilized treatment gloves along with hand  prior to inserting the needles. All needles where removed and discarded in the appropriate sharps container. Charges:  Timed Code Treatment Minutes: 30   Total Treatment Minutes:  35   BWC:  TE TIME:  NMR TIME:  MANUAL TIME:  UNTIMED MINUTES:  Medicare Total:                 [] EVAL (LOW) 76290 (typically 20 minutes face-to-face)  [] EVAL (MOD) 44230 (typically 30 minutes face-to-face)  [] EVAL (HIGH) 21062 (typically 45 minutes face-to-face)  [] RE-EVAL     [x] XW(79204) x     [] IONTO  [] NMR (91376) x     [] VASO  [x] Manual (03098) x     [x] Other: dn  [] TA x      [] Mech Traction (51877)  [] ES(attended) (67341)      [] ES (un) (73308):    ASSESSMENT:  Good tolerance of manual therapy and dn, no increased sx no adverse events noted. Pt stated some relief at conclusion with decreased scap pain with active shoulder elevation.  Educated on use of heat and stretches to reduce mm soreness. Prone ex to HEP with good understanding and tolerance. GOALS:     Patient stated goal: Less pain. Therapist goals for Patient:   Short Term Goals: To be achieved in: 2 weeks  1. Independent in HEP and progression per patient tolerance, in order to prevent re-injury. [] Progressing: [] Met: [] Not Met: [] Adjusted     2. Patient will have a decrease in pain to facilitate improvement in movement, function, and ADLs as indicated by Functional Deficits. [] Progressing: [] Met: [] Not Met: [] Adjusted     Long Term Goals: To be achieved in: 12 weeks  1. FOTO score will match or exceed predicted score to assist with reaching prior level of function. [] Progressing: [] Met: [] Not Met: [] Adjusted     2. Patient will demonstrate increased AROM to 170 active shoulder elevation to allow for proper joint functioning as indicated by patients Functional Deficits. [] Progressing: [] Met: [] Not Met: [] Adjusted     3. Patient will demonstrate an increase in Strength to 5/5 to allow for proper functional mobility as indicated by patients Functional Deficits. [] Progressing: [] Met: [] Not Met: [] Adjusted     4. 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 [] Progressing: [] Met: [] Not Met: [] Adjusted     5. Patient will report 80-90% subjective decrease in pain/symptoms (patient specific functional goal)    [] Progressing: [] Met: [] Not Met: [] Adjusted         Overall Progression Towards Functional goals/ Treatment Progress Update:  [] Patient is progressing as expected towards functional goals listed. [] Progression is slowed due to complexities/Impairments listed. [] Progression has been slowed due to co-morbidities.   [x] Plan just implemented, too soon to assess goals progression <30days   [] Goals require adjustment due to lack of progress  [] Patient is not progressing as expected and requires additional follow up with physician  [] Other    Prognosis for POC: [x] Good [] Fair  [] Poor      Patient requires continued skilled intervention: [x] Yes  [] No    Treatment/Activity Tolerance:  [x] Patient able to complete treatment  [] Patient limited by fatigue  [] Patient limited by pain    [] Patient limited by other medical complications  [] Other:       PLAN: See eval  [x] Continue per plan of care [] Alter current plan (see comments above)  [] Plan of care initiated [] Hold pending MD visit [] Discharge    Electronically signed by:  Zach Schultz PT    Note: If patient does not return for scheduled/ recommended follow up visits, this note will serve as a discharge from care along with most recent update on progress.

## 2023-01-16 ENCOUNTER — HOSPITAL ENCOUNTER (OUTPATIENT)
Dept: PHYSICAL THERAPY | Age: 28
Setting detail: THERAPIES SERIES
Discharge: HOME | End: 2023-01-16
Payer: COMMERCIAL

## 2023-01-16 NOTE — FLOWSHEET NOTE
[facility], [address] [place] [address], [address] [place] [address] [address]  Phone: [phone]   Fax:     [phone]    Physical Therapy  Cancellation/No-show Note  Patient Name:  Nivia Akins  :  1995   Date:  2023    Cancelled visits to date: 1  No-shows to date: 0    For today's appointment patient:  [x]  Cancelled  [x]  Rescheduled appointment  []  No-show     Reason given by patient:  []  Patient ill  []  Conflicting appointment  []  No transportation    []  Conflict with work  []  No reason given  [x]  Other:     Comments:  wants more time to work on the exercises, rescheduled for next week. Phone call information:   []  Phone call made today to patient at am/pm at the number provided:      []  Patient answered, conversation as follows:    []  Patient did not answer, message left as follows:  [x]  Phone call not needed - pt contacted us to cancel and provided reason for cancellation.      Electronically signed by:  Raffi Guzmán PT, PT

## 2023-01-23 ENCOUNTER — HOSPITAL ENCOUNTER (OUTPATIENT)
Dept: PHYSICAL THERAPY | Age: 28
Setting detail: THERAPIES SERIES
Discharge: HOME | End: 2023-01-23
Payer: COMMERCIAL

## 2023-01-23 PROCEDURE — 97110 THERAPEUTIC EXERCISES: CPT

## 2023-01-23 PROCEDURE — 97140 MANUAL THERAPY 1/> REGIONS: CPT

## 2023-01-23 PROCEDURE — 20560 NDL INSJ W/O NJX 1 OR 2 MUSC: CPT

## 2023-01-23 NOTE — FLOWSHEET NOTE
[address] [place] [address] and [facility], [address] [place] [address] [address]  Phone: [phone]   Fax:     [phone]      Physical Therapy Treatment Note/ Progress Report:     Date: 2023          Patient Name; :  Alicia Cameron; 1995   Dx:   Subacromial bursitis of left shoulder joint M75.52     Physician:  Lyla oCrtes        Total PT Visits: 5     Measures Previous Current   Pain (0-10) 2-6 1-2   Disability % 23% 20%   ROM Cervical flexion 60 pulling  Extension 50    ROM Left Right   Shoulder Flex 165 p! 170   Shoulder Abd       Shoulder ER To C1-2 To C4-5   Shoulder IR To L1-2 To T10-12    Cervical flexion 60  Extension 60    ROM Left Right   Shoulder Flex 170  170   Shoulder Abd       Shoulder ER To C2-3 To C4-5   Shoulder IR To LT12 To T10-12                Strength Strength  Left Right   Shoulder Flex 4/5 4+/5   Shoulder Scap 4/5 4+/5   Shoulder ER 4/5 4+/5   Shoulder IR 4/5 4+/5    Strength  Left Right   Shoulder Flex 4+/5 4+/5   Shoulder Scap 4+/5 4+/5   Shoulder ER 4/5 4+/5   Shoulder IR 4+/5 4+/5                  Specific Functional Improvements & Impressions:  ROM, strength and function gradually progressing. Pt stated no longer getting pain down her arm, just some mild occasional tingling in her fingers and tightness in shoulder. Dc prone ex due to increased pain when attempt. Increasing resistance with TB ex. Pt with good tolerance of dn, stated does feel relief with decreased mm tension following.     Plan & Recommendations:  [x] Continue rehabilitation due to objective improvement and continued functional deficits with frequency and duration: every other week  [] Progress toward  []GAP, []Work Conditioning, []Independent HEP   [] Discharge due to   [] All goals achieved, [] Maximized \"medical necessity\" [] No subjective or objective improvements      Electronically signed by:  Sivakumar Rosario PT  Therapy Plan of Care Re-Certification  This patient has been re-evaluated for physical therapy services and for therapy to continue, Medicare, Medicaid and other insurances require periodic physician review of the treatment plan. Please review the above re-evaluation and verify that you agree with plan of care as established above by signing the attached document and return it to our office or note changes to established plan below  [] Follow treatment plan as above [] Discontinue physical therapy  [] Change plan to:                                 __________________________________________________    Physician Signature:____________________________________ Date:____________  By signing above, therapists plan is approved by physician    If you have any questions or concerns, please don't hesitate to call.   Thank you for your referral.     Date:  2023    Patient Name:  Rayshawn Elkins    :  1995  MRN: [id number]  Restrictions/Precautions:    Medical/Treatment Diagnosis Information:  Diagnosis: Subacromial bursitis of left shoulder joint M75.52  Treatment Diagnosis: Left shoulder pain M25. 677  Insurance/Certification information:  PT Insurance Information: [employer] [address] [employer]  Physician Information:   Elysia Nielson  Has the plan of care been signed (Y/N):        []  Yes  [x]  No     Date of Patient follow up with Physician: after PT    Is this a Progress Report:     []  Yes  [x]  No     If Yes:  Date Range for reporting period:  Beginnin22 ------------ Endin23    Progress report will be due (10 Rx or 30 days whichever is less):      Recertification will be due (POC Duration  / 90 days whichever is less): 3/19/23      Visit # Insurance Allowable Auth Required   In Person 3   30 []  Yes     []  No    Tele Health 0  []  Yes     []  No    Total 5       FOTO Score: QDASH   20%   Date assessed:   23      Latex Allergy:  [x]NO      []YES  Preferred Language for Healthcare:   [x]English       []other:    Pain level: 1-2/10     SUBJECTIVE:   Pt reports pain with prone ex stated has tried every other day but not able to tolerate. OBJECTIVE: See eval  Observation:   Test measurements:      RESTRICTIONS/PRECAUTIONS: Hashimotos    Exercises/Interventions:   Therapeutic Ex (03812) Sets/sec Reps Notes/CUES HEP   Pec corner stretch  10 vc    TB ext 2 10 Blk, vc    TB row 2 10 vc    TB ER no $ 2 10 Blue, vc    TB ER 2 10 vc    TB IR  Wall push ups 2  2 10  10 vc    Bridge 2 10 vc    Standing hip ext 2 10ea vc                            Manual Intervention (76092)       STM L UT, scap mm, 1st rib mobs L grade I/II    Dn  15 min      5 min    Posture, desk ergonomics, HEP with gradual progression, goals of PT, not to push through pain with ex or activity- pt stated understanding                                       NMR re-education (70456)   CUES NEEDED                                                                   Therapeutic Activity (31189)                                          [employer] access code:  [email or web address]           Therapeutic Exercise and NMR EXR  [x] (01902) Provided verbal/tactile cueing for activities related to strengthening, flexibility, endurance, ROM  for improvements in scapular, scapulothoracic and UE control with self care, reaching, carrying, lifting, house/yardwork, driving/computer work. [x] (09829) Provided verbal/tactile cueing for activities related to improving balance, coordination, kinesthetic sense, posture, motor skill, proprioception  to assist with  scapular, scapulothoracic and UE control with self care, reaching, carrying, lifting, house/yardwork, driving/computer work.     Therapeutic Activities:    [x] (63193 or 19054) Provided verbal/tactile cueing for activities related to improving balance, coordination, kinesthetic sense, posture, motor skill, proprioception and motor activation to allow for proper function of scapular, scapulothoracic and UE control with self care, carrying, lifting, driving/computer work. Home Exercise Program:    [x] (19202) Reviewed/Progressed HEP activities related to strengthening, flexibility, endurance, ROM of scapular, scapulothoracic and UE control with self care, reaching, carrying, lifting, house/yardwork, driving/computer work  [] (21985) Reviewed/Progressed HEP activities related to improving balance, coordination, kinesthetic sense, posture, motor skill, proprioception of scapular, scapulothoracic and UE control with self care, reaching, carrying, lifting, house/yardwork, driving/computer work      Manual Treatments:  PROM / STM / Oscillations-Mobs:  G-I, II, III, IV (PA's, Inf., Post.)  [x] (23978) Provided manual therapy to mobilize soft tissue/joints of cervical/CT, scapular GHJ and UE for the purpose of modulating pain, promoting relaxation,  increasing ROM, reducing/eliminating soft tissue swelling/inflammation/restriction, improving soft tissue extensibility and allowing for proper ROM for normal function with self care, reaching, carrying, lifting, house/yardwork, driving/computer work    Modalities:     [] GAME READY (VASO)- for significant edema, swelling, pain control. Dry needling manual therapy: consisted on the placement of 8 needles in the following muscles:  L UT, L scap mm pull away technique,. A 30-40 mm needle was inserted, piston, rotated, and coned to produce intramuscular mobilization. These techniques were used to restore functional range of motion, reduce muscle spasm and induce healing in the corresponding musculature. (33446)  Clean Technique was utilized today while applying Dry needling treatment. The treatment sites where cleaned with 70% solution of  isopropyl alcohol . The PT washed their hands and utilized treatment gloves along with hand  prior to inserting the needles. All needles where removed and discarded in the appropriate sharps container.         Charges:  Timed Code Treatment Minutes: 30   Total Treatment Minutes:  35   BWC:  TE TIME:  NMR TIME:  MANUAL TIME:  UNTIMED MINUTES:  Medicare Total:                 [] EVAL (LOW) 90574 (typically 20 minutes face-to-face)  [] EVAL (MOD) 41003 (typically 30 minutes face-to-face)  [] EVAL (HIGH) 37317 (typically 45 minutes face-to-face)  [] RE-EVAL     [x] DV(34724) x     [] IONTO  [] NMR (79715) x     [] VASO  [x] Manual (88218) x     [x] Other: dn  [] TA x      [] Mech Traction (89346)  [] ES(attended) (38247)      [] ES (un) (53620):    ASSESSMENT:  See above. GOALS:     Patient stated goal: Less pain. Therapist goals for Patient:   Short Term Goals: To be achieved in: 2 weeks  1. Independent in HEP and progression per patient tolerance, in order to prevent re-injury. [x] Progressing: [] Met: [] Not Met: [] Adjusted     2. Patient will have a decrease in pain to facilitate improvement in movement, function, and ADLs as indicated by Functional Deficits. [x] Progressing: [] Met: [] Not Met: [] Adjusted     Long Term Goals: To be achieved in: 12 weeks  1. FOTO score will match or exceed predicted score to assist with reaching prior level of function. [x] Progressing: [] Met: [] Not Met: [] Adjusted     2. Patient will demonstrate increased AROM to 170 active shoulder elevation to allow for proper joint functioning as indicated by patients Functional Deficits. [x] Progressing: [] Met: [] Not Met: [] Adjusted     3. Patient will demonstrate an increase in Strength to 5/5 to allow for proper functional mobility as indicated by patients Functional Deficits. [x] Progressing: [] Met: [] Not Met: [] Adjusted     4. 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 [x] Progressing: [] Met: [] Not Met: [] Adjusted     5.  Patient will report 80-90% subjective decrease in pain/symptoms (patient specific functional goal)    [x] Progressing: [] Met: [] Not Met: [] Adjusted         Overall Progression Towards Functional goals/ Treatment Progress Update:  [x] Patient is progressing as expected towards functional goals listed. [] Progression is slowed due to complexities/Impairments listed. [] Progression has been slowed due to co-morbidities. [] Plan just implemented, too soon to assess goals progression <30days   [] Goals require adjustment due to lack of progress  [] Patient is not progressing as expected and requires additional follow up with physician  [] Other    Prognosis for POC: [x] Good [] Fair  [] Poor      Patient requires continued skilled intervention: [x] Yes  [] No    Treatment/Activity Tolerance:  [x] Patient able to complete treatment  [] Patient limited by fatigue  [] Patient limited by pain    [] Patient limited by other medical complications  [] Other:       PLAN: See eval  [x] Continue per plan of care [] Alter current plan (see comments above)  [] Plan of care initiated [] Hold pending MD visit [] Discharge    Electronically signed by:  Valeri Ley, PT    Note: If patient does not return for scheduled/ recommended follow up visits, this note will serve as a discharge from care along with most recent update on progress.

## 2023-01-27 DIAGNOSIS — E03.9 ACQUIRED HYPOTHYROIDISM: ICD-10-CM

## 2023-01-27 DIAGNOSIS — E06.3 HASHIMOTO'S THYROIDITIS: ICD-10-CM

## 2023-01-27 DIAGNOSIS — E28.2 PCOS (POLYCYSTIC OVARIAN SYNDROME): ICD-10-CM

## 2023-01-27 DIAGNOSIS — E55.9 VITAMIN D DEFICIENCY: ICD-10-CM

## 2023-01-27 LAB
A/G RATIO: 1.6 (ref 1.1–2.2)
ALBUMIN SERPL-MCNC: 4.1 G/DL (ref 3.4–5)
ALP BLD-CCNC: 78 U/L (ref 40–129)
ALT SERPL-CCNC: 13 U/L (ref 10–40)
ANION GAP SERPL CALCULATED.3IONS-SCNC: 16 MMOL/L (ref 3–16)
AST SERPL-CCNC: 20 U/L (ref 15–37)
BILIRUB SERPL-MCNC: 0.3 MG/DL (ref 0–1)
BUN BLDV-MCNC: 7 MG/DL (ref 7–20)
CALCIUM SERPL-MCNC: 9.1 MG/DL (ref 8.3–10.6)
CHLORIDE BLD-SCNC: 102 MMOL/L (ref 99–110)
CO2: 22 MMOL/L (ref 21–32)
CREAT SERPL-MCNC: 0.6 MG/DL (ref 0.6–1.1)
GFR SERPL CREATININE-BSD FRML MDRD: >60 ML/MIN/{1.73_M2}
GLUCOSE BLD-MCNC: 85 MG/DL (ref 70–99)
POTASSIUM SERPL-SCNC: 4.3 MMOL/L (ref 3.5–5.1)
SODIUM BLD-SCNC: 140 MMOL/L (ref 136–145)
T3 FREE: 3.1 PG/ML (ref 2.3–4.2)
T4 FREE: 1.4 NG/DL (ref 0.9–1.8)
TOTAL PROTEIN: 6.7 G/DL (ref 6.4–8.2)
TSH SERPL DL<=0.05 MIU/L-ACNC: 0.92 UIU/ML (ref 0.27–4.2)
VITAMIN D 25-HYDROXY: 52.4 NG/ML

## 2023-02-01 ENCOUNTER — OFFICE VISIT (OUTPATIENT)
Dept: ENDOCRINOLOGY | Age: 28
End: 2023-02-01
Payer: COMMERCIAL

## 2023-02-01 VITALS
BODY MASS INDEX: 38.64 KG/M2 | WEIGHT: 204.4 LBS | SYSTOLIC BLOOD PRESSURE: 114 MMHG | OXYGEN SATURATION: 97 % | HEART RATE: 83 BPM | DIASTOLIC BLOOD PRESSURE: 79 MMHG

## 2023-02-01 DIAGNOSIS — E03.9 ACQUIRED HYPOTHYROIDISM: Primary | ICD-10-CM

## 2023-02-01 DIAGNOSIS — E66.9 CLASS 2 OBESITY WITH BODY MASS INDEX (BMI) OF 38.0 TO 38.9 IN ADULT, UNSPECIFIED OBESITY TYPE, UNSPECIFIED WHETHER SERIOUS COMORBIDITY PRESENT: ICD-10-CM

## 2023-02-01 DIAGNOSIS — E28.2 PCOS (POLYCYSTIC OVARIAN SYNDROME): ICD-10-CM

## 2023-02-01 DIAGNOSIS — E06.3 HASHIMOTO'S THYROIDITIS: ICD-10-CM

## 2023-02-01 DIAGNOSIS — E55.9 VITAMIN D DEFICIENCY: ICD-10-CM

## 2023-02-01 PROBLEM — E66.812 CLASS 2 OBESITY WITH BODY MASS INDEX (BMI) OF 38.0 TO 38.9 IN ADULT: Status: ACTIVE | Noted: 2023-02-01

## 2023-02-01 LAB
DHEAS (DHEA SULFATE): 191 UG/DL (ref 65–380)
SEX HORMONE BINDING GLOBULIN: 132 NMOL/L (ref 30–135)
TESTOSTERONE FREE-NONMALE: 3.8 PG/ML (ref 0.8–7.4)
TESTOSTERONE TOTAL: 59 NG/DL (ref 20–70)

## 2023-02-01 PROCEDURE — 99214 OFFICE O/P EST MOD 30 MIN: CPT | Performed by: INTERNAL MEDICINE

## 2023-02-01 RX ORDER — BUSPIRONE HYDROCHLORIDE 7.5 MG/1
TABLET ORAL 2 TIMES DAILY
COMMUNITY
Start: 2023-01-05

## 2023-02-01 RX ORDER — LEVOTHYROXINE SODIUM 88 UG/1
TABLET ORAL
Qty: 90 TABLET | Refills: 1 | Status: SHIPPED | OUTPATIENT
Start: 2023-02-01

## 2023-02-01 NOTE — PROGRESS NOTES
SUBJECTIVE:  Ian Tyler is a 32 y.o. female who is being evaluated for hypothyroidism. 1. Acquired hypothyroidism  This started in 2009. Patient was diagnosed with hypothyroidism. The problem has been unchanged. Previous thyroid studies include: TSH and free thyroxine. Patient started medication in 2013. Currently patient is on: levothyroxine. Misses  0 doses a month. Current complaints: hypersomnia, fatigue, anxiety    Past medical history of hypothyroidism, PCOS, obesity, vitamin D, hypersomnia    2. PCOS (polycystic ovarian syndrome)  Never used Metformin   On birth control  Could not tolerate Aldactone, did not take for a long time  Has hair on the face  Shaves face, BCP helps some  On BCP no period  Off BCP irregular periods  5 yo started periods    3. Vitamin D insufficiency  Has fatigue    4. Hashimoto's thyroiditis  History of obstructive symptoms: difficulty swallowing No, changes in voice/hoarseness No.  History of radiation to patient's neck: No  Resent iodine exposure: No  Family history includes hyperthyroidism, hypothyroidism, Hashmotos, graves  Family history of thyroid cancer: No    5. Obesity  Eats healthier, active       EXAMINATION:   THYROID ULTRASOUND       10/13/2020       COMPARISON:   None. HISTORY:   ORDERING SYSTEM PROVIDED HISTORY: Hashimoto's thyroiditis   TECHNOLOGIST PROVIDED HISTORY:   Reason for exam:->neck pressure. H/o hashimoto's thyroiditis       Patient on thyroid meds for 8 or 9 years. FINDINGS:   Right thyroid lobe:  4.0 x 1.6 x 1.5 cm       Left thyroid lobe:  3.9 x 1.1 x 1.1 cm       Isthmus:  2.9 mm. Thyroid Gland:  Thyroid gland demonstrates homogeneous echotexture and normal   vascularity. Nodules: No thyroid nodules are present. Cervical lymphadenopathy: No abnormal lymph nodes in the imaged portions of   the neck. Impression   Normal sonographic appearance of the thyroid.   No thyroid nodule or finding   of active thyroiditis.              Past Medical History:   Diagnosis Date    Allergic rhinitis     Anxiety     Asthma     Breast cyst     Reports a cyst in right breast as a teenager- not sure of breast imaging     Depression     Dizziness     GERD (gastroesophageal reflux disease)     Hashimoto's thyroiditis     Headache     HPV (human papilloma virus) infection     Hypersomnia     PCOS (polycystic ovarian syndrome)      Patient Active Problem List    Diagnosis Date Noted    Class 2 obesity with body mass index (BMI) of 38.0 to 38.9 in adult 02/01/2023    Vitamin D deficiency 10/05/2022    Class 2 obesity with body mass index (BMI) of 37.0 to 37.9 in adult 10/05/2022    Carpal tunnel syndrome     Carpal tunnel syndrome, bilateral 01/26/2022    PCOS (polycystic ovarian syndrome)     Acquired hypothyroidism     Class 2 obesity with body mass index (BMI) of 35.0 to 35.9 in adult     Family history of breast cancer 04/16/2021    Hashimoto's thyroiditis 03/18/2020    Vitamin D insufficiency 03/18/2020    Pain in both knees 03/18/2020    Asthma in adult 03/18/2020    Persistent depressive disorder 03/18/2020     Past Surgical History:   Procedure Laterality Date    BREAST ENHANCEMENT SURGERY      CARPAL TUNNEL RELEASE Left 03/22/2022    LEFT CARPAL TUNNEL RELEASE performed by Roshan Minor MD at [address] [place] [address],[facility] Right 04/05/2022    RIGHT CARPAL TUNNEL RELEASE [facility]      WISDOM TOOTH EXTRACTION       Family History   Problem Relation Age of Onset    Breast Cancer Mother     Asthma Mother     Diabetes Father     Alcohol Abuse Father     Arthritis Father     Breast Cancer Maternal Aunt     Breast Cancer Paternal Aunt     Cancer Maternal Grandmother     Breast Cancer Maternal Grandmother     Alcohol Abuse Paternal Grandfather     Asthma Sister     Breast Cancer Paternal Aunt     Cancer Paternal Aunt Cancer Maternal Aunt      Social History     Socioeconomic History    Marital status: Single     Spouse name: None    Number of children: None    Years of education: None    Highest education level: None   Tobacco Use    Smoking status: Never     Passive exposure: Yes    Smokeless tobacco: Never   Vaping Use    Vaping Use: Never used   Substance and Sexual Activity    Alcohol use: Not Currently     Alcohol/week: 2.0 standard drinks     Types: 2 Cans of beer per week     Comment: 2-3 drinks per week    Drug use: Not Currently    Sexual activity: Not Currently     Partners: Male     Social Determinants of Health     Financial Resource Strain: Low Risk     Difficulty of Paying Living Expenses: Not hard at all   Food Insecurity: No Food Insecurity    [facility] Never true    Ran Out of Food in the Last Year: Never true   Transportation Needs: No Transportation Needs    Lack of Transportation (Medical): No    Lack of Transportation (Non-Medical): No   Housing Stability: Unknown    Unable to Pay for Housing in the Last Year: No    Unstable Housing in the Last Year: No     Current Outpatient Medications   Medication Sig Dispense Refill    busPIRone (BUSPAR) 7.5 MG tablet 2 times daily      levothyroxine (SYNTHROID) 88 MCG tablet TAKE 1 TABLET BY MOUTH ONCE DAILY 90 tablet 0    sertraline (ZOLOFT) 100 MG tablet 200 mg daily      diazePAM (VALIUM) 2 MG tablet as needed.       vitamin D (ERGOCALCIFEROL) 1.25 MG (06102 UT) CAPS capsule Take 1 capsule by mouth once a week 12 capsule 1    drospirenone-ethinyl estradiol (SALLIE) 3-0.03 MG TABS TAKE ONE TABLET BY MOUTH DAILY 28 tablet 1    albuterol sulfate HFA (VENTOLIN HFA) 108 (90 Base) MCG/ACT inhaler Inhale 2 puffs into the lungs 4 times daily as needed for Wheezing 1 each 5    buPROPion (WELLBUTRIN XL) 150 MG extended release tablet Take 150 mg by mouth daily      fluticasone (FLONASE) 50 MCG/ACT nasal spray 1 spray by Each Nostril route 2 times daily (Patient taking differently: 1 spray by Each Nostril route as needed) 1 Bottle 2    SUNOSI 150 MG TABS daily       methylphenidate (RITALIN LA) 20 MG extended release capsule Take 20 mg by mouth 2 times daily. methylphenidate (RITALIN) 10 MG tablet Take 10 mg by mouth as needed. In addition to 20 mg bid      diclofenac (VOLTAREN) 75 MG EC tablet Take 1 tablet by mouth 2 times daily 60 tablet 0    predniSONE (DELTASONE) 10 MG tablet 4 daily x 3 days then 3 daily x 3 days then 2 daily x 3 days then 1 daily x 3 days (Patient not taking: Reported on 2/1/2023) 30 tablet 0     No current facility-administered medications for this visit.      Allergies   Allergen Reactions    Latex Rash     Family Status   Relation Name Status    Mother King Adama Day Alive    Father Rachel Castro  (Not Specified)    C/ Adam Manuel 33 (Not Specified)    MGM Nonda Grebe (Not Specified)    PGF Caty Barley (Not Specified)    Sister Theodora Stuart (Not Specified)    PAunt Zuhair Lee (Not Specified)    Mckenna Dilip (Not Specified)    Shanta Semen (Not Specified)       Review of Systems:  Constitutional: has fatigue, no fever, has recent weight gain, no recent weight loss, no changes in appetite  Eyes: no eye pain, no change in vision, no eye redness, no eye irritation, no double vision  Ears, nose, throat: has nasal congestion, no sore throat, no earache, no decrease in hearing, no hoarseness, no dry mouth, has sinus problems, no difficulty swallowing, no neck lumps, no dental problems, no mouth sores, no ringing in ears  Pulmonary: no shortness of breath, no wheezing, no dyspnea on exertion, no cough  Cardiovascular: no chest pain, no lower extremity edema, no orthopnea, no intermittent leg claudication, no palpitations  Gastrointestinal: no abdominal pain, no nausea, no vomiting, no diarrhea, no constipation, no dysphagia, no heartburn, no bloating  Genitourinary: no dysuria, no urinary incontinence, no urinary hesitancy, no urinary frequency, no feelings of urinary urgency, no nocturia  Musculoskeletal: no joint swelling, no joint stiffness, has joint pain, no muscle cramps, no muscle pain, no bone pain  Integument/Breast: no hair loss, no skin rashes, no skin lesions, no itching, has dry skin  Neurological: no numbness, no tingling, no weakness, no confusion, nhas headaches, no dizziness, no fainting, no tremors, no decrease in memory, no balance problems  Psychiatric: has anxiety, has depression, no insomnia  Hematologic/Lymphatic: no tendency for easy bleeding, no swollen lymph nodes, no tendency for easy bruising  Immunology: has seasonal allergies, no frequent infections, no frequent illnesses  Endocrine: has temperature intolerance    /79   Pulse 83   Wt 204 lb 6.4 oz (92.7 kg)   SpO2 97%   BMI 38.64 kg/m²    Wt Readings from Last 3 Encounters:   02/01/23 204 lb 6.4 oz (92.7 kg)   12/13/22 200 lb (90.7 kg)   11/07/22 200 lb (90.7 kg)     Body mass index is 38.64 kg/m².     OBJECTIVE:  Constitutional: no acute distress, well appearing and well nourished  Psychiatric: oriented to person, place and time, judgement and insight and normal, recent and remote memory and intact and mood and affect are normal  Skin: skin and subcutaneous tissue is normal without mass, normal turgor  Head and Face: examination of head and face revealed no abnormalities  Eyes: no lid or conjunctival swelling, erythema or discharge, pupils are normal, equal, round, reactive to light  Ears/Nose: external inspection of ears and nose revealed no abnormalities, hearing is grossly normal  Oropharynx/Mouth/Face: lips, tongue and gums are normal with no lesions, the voice quality was normal  Neck: neck is supple and symmetric, with midline trachea and no masses, thyroid is normal  Lymphatics: normal cervical lymph nodes, normal supraclavicular nodes  Pulmonary: no increased work of breathing or signs of respiratory distress, lungs are clear to auscultation  Cardiovascular: normal heart rate and rhythm, normal S1 and S2, no murmurs and pedal pulses and 2+ bilaterally, No edema  Abdomen: abdomen is soft, non-tender with no masses  Musculoskeletal: normal gait and station and exam of the digits and nails are normal  Neurological: normal coordination and normal general cortical function      Lab Review:    No results found for: WBC, HGB, HCT, MCV, PLT  Lab Results   Component Value Date/Time     01/27/2023 11:19 AM    K 4.3 01/27/2023 11:19 AM     01/27/2023 11:19 AM    CO2 22 01/27/2023 11:19 AM    BUN 7 01/27/2023 11:19 AM    CREATININE 0.6 01/27/2023 11:19 AM    GLUCOSE 85 01/27/2023 11:19 AM    CALCIUM 9.1 01/27/2023 11:19 AM    PROT 6.7 01/27/2023 11:19 AM    LABALBU 4.1 01/27/2023 11:19 AM    BILITOT 0.3 01/27/2023 11:19 AM    ALKPHOS 78 01/27/2023 11:19 AM    AST 20 01/27/2023 11:19 AM    ALT 13 01/27/2023 11:19 AM    LABGLOM >60 01/27/2023 11:19 AM    GFRAA >60 10/04/2022 11:30 AM    AGRATIO 1.6 01/27/2023 11:19 AM    GLOB 2.8 09/28/2021 09:50 AM     Lab Results   Component Value Date/Time    TSHFT4 0.86 08/20/2020 09:52 AM    TSH 0.92 01/27/2023 11:19 AM    FT3 3.1 01/27/2023 11:19 AM     No results found for: LABA1C  No results found for: EAG  Lab Results   Component Value Date/Time    CHOL 209 05/31/2022 09:12 AM     Lab Results   Component Value Date/Time    TRIG 173 05/31/2022 09:12 AM     Lab Results   Component Value Date/Time    HDL 64 05/31/2022 09:12 AM     Lab Results   Component Value Date/Time    LDLCALC 110 05/31/2022 09:12 AM     No results found for: LABVLDL, VLDL  No results found for: CHOLHDLRATIO  No results found for: LABMICR, [id number]  Lab Results   Component Value Date/Time    VITD25 52.4 01/27/2023 11:19 AM        ASSESSMENT/PLAN:    1. Acquired hypothyroidism  Call with TSH, adjust the dose  Still very tired. TSH 0.87-1.95-2.53-0.92  Had carpal tunnel sugery.   Levothyroxine (SYNTHROID) 0.088 mg daily  - T3, Free; Future  - T4, Free; Future  - TSH without Reflex; Future    2. PCOS (polycystic ovarian syndrome)  Will stop BCP, has weight gain on them  Tried Metformin, but was sick from it, also diarrhea  1 mg overnight dexamethasone suppression test-cortisol less than 0.8, appropriate suppression  Prolactin 14.6  ACTH 19  Testosterone 36-59  Free testosterone 3.8  DHEA-S 164-191  - Comprehensive Metabolic Panel; Future  - DHEA-Sulfate; Future  - Testosterone, free, total; Future  - Insulin, total; Future    3. Vitamin D insufficiency  Vitamin D 50,000 IU weekly  25 hydroxy vitamin D 21.9-20.0-25.9-42.5-52.4  - Vitamin D 25 Hydroxy; Future    4. Hashimoto's thyroiditis  - T3, Free; Future  - T4, Free; Future  - TSH without Reflex; Future    5. Obesity  Diet, exercise  Dietician consult if insurance covers    Reviewed and/or ordered clinical lab results Yes  Reviewed and/or ordered radiology tests Yes   Reviewed and/or ordered other diagnostic tests No  Discussed test results with performing physician No  Independently reviewed image, tracing, or specimen No  Made a decision to obtain old records No  Reviewed and summarized old records Yes   Hypothyroidism  Hashimoto's thyroiditis  TSH 2.86  Levothyroxine 0.075 mg  PCOS  Obtained history from other than patient No    Steven Islas was counseled regarding symptoms of thyroid, PCOS diagnosis, course and complications of disease if inadequately treated, side effects of medications, diagnosis, treatment options, and prognosis, risks, benefits, complications, and alternatives of treatment, labs, imaging and other studies and treatment targets and goals. She understands instructions and counseling. Return in about 4 months (around 6/1/2023) for thyroid problems.     Electronically signed by Andrew Perez MD on 2/1/2023 at 4:03 PM

## 2023-02-06 ENCOUNTER — HOSPITAL ENCOUNTER (OUTPATIENT)
Dept: PHYSICAL THERAPY | Age: 28
Setting detail: THERAPIES SERIES
Discharge: HOME | End: 2023-02-06

## 2023-02-06 NOTE — FLOWSHEET NOTE
[address] [facility], [address] [place] [address], [address] [place] [address] [address]  Phone: [phone]   Fax:     [phone]    Physical Therapy  Cancellation/No-show Note  Patient Name:  Ansley Jacobson  :  1995   Date:  2023    Cancelled visits to date: 2  No-shows to date: 0    For today's appointment patient:  [x]  Cancelled  []  Rescheduled appointment  []  No-show     Reason given by patient:  []  Patient ill  []  Conflicting appointment  []  No transportation    [x]  Conflict with work  []  No reason given  []  Other:     Comments:      Phone call information:   []  Phone call made today to patient at am/pm at the number provided:      []  Patient answered, conversation as follows:    []  Patient did not answer, message left as follows:  [x]  Phone call not needed - pt contacted us to cancel and provided reason for cancellation.      Electronically signed by:  Danny Mooney, PT, PT no

## 2023-04-07 DIAGNOSIS — N64.4 BREAST PAIN: Primary | ICD-10-CM

## 2023-04-25 ENCOUNTER — HOSPITAL ENCOUNTER (OUTPATIENT)
Dept: MRI IMAGING | Age: 28
Discharge: HOME | End: 2023-04-25
Payer: COMMERCIAL

## 2023-04-25 DIAGNOSIS — Z12.39 ENCOUNTER FOR OTHER SCREENING FOR MALIGNANT NEOPLASM OF BREAST: ICD-10-CM

## 2023-04-25 PROCEDURE — C8908 MRI W/O FOL W/CONT, BREAST,: HCPCS

## 2023-04-25 PROCEDURE — A9579 GAD-BASE MR CONTRAST NOS,1ML: HCPCS

## 2023-04-25 PROCEDURE — 6360000004 HC RX CONTRAST MEDICATION

## 2023-04-25 RX ADMIN — GADOTERIDOL 18 ML: 279.3 INJECTION, SOLUTION INTRAVENOUS at 13:41

## 2023-06-08 DIAGNOSIS — E55.9 VITAMIN D INSUFFICIENCY: ICD-10-CM

## 2023-06-08 RX ORDER — ERGOCALCIFEROL 1.25 MG/1
50000 CAPSULE ORAL WEEKLY
Qty: 12 CAPSULE | Refills: 0 | Status: SHIPPED | OUTPATIENT
Start: 2023-06-08

## 2023-06-08 NOTE — TELEPHONE ENCOUNTER
4/13/2022    Future Appointments   Date Time Provider Sanya Webster   7/5/2023  3:30 PM Marcella Arriaza MD [facility]   8/18/2023 12:20 PM DO [place] [address]

## 2023-06-09 RX ORDER — ALBUTEROL SULFATE 90 UG/1
2 AEROSOL, METERED RESPIRATORY (INHALATION) 4 TIMES DAILY PRN
Qty: 1 EACH | Refills: 5 | Status: SHIPPED | OUTPATIENT
Start: 2023-06-09

## 2023-07-03 DIAGNOSIS — E55.9 VITAMIN D DEFICIENCY: ICD-10-CM

## 2023-07-03 DIAGNOSIS — E03.9 ACQUIRED HYPOTHYROIDISM: ICD-10-CM

## 2023-07-03 DIAGNOSIS — E06.3 HASHIMOTO'S THYROIDITIS: ICD-10-CM

## 2023-07-03 DIAGNOSIS — E28.2 PCOS (POLYCYSTIC OVARIAN SYNDROME): ICD-10-CM

## 2023-07-03 LAB
25(OH)D3 SERPL-MCNC: 52.3 NG/ML
ALBUMIN SERPL-MCNC: 4.4 G/DL (ref 3.4–5)
ALBUMIN/GLOB SERPL: 1.5 {RATIO} (ref 1.1–2.2)
ALP SERPL-CCNC: 97 U/L (ref 40–129)
ALT SERPL-CCNC: 11 U/L (ref 10–40)
ANION GAP SERPL CALCULATED.3IONS-SCNC: 11 MMOL/L (ref 3–16)
AST SERPL-CCNC: 13 U/L (ref 15–37)
BILIRUB SERPL-MCNC: 0.4 MG/DL (ref 0–1)
BUN SERPL-MCNC: 5 MG/DL (ref 7–20)
CALCIUM SERPL-MCNC: 9 MG/DL (ref 8.3–10.6)
CHLORIDE SERPL-SCNC: 103 MMOL/L (ref 99–110)
CO2 SERPL-SCNC: 25 MMOL/L (ref 21–32)
CREAT SERPL-MCNC: 0.7 MG/DL (ref 0.6–1.1)
GFR SERPLBLD CREATININE-BSD FMLA CKD-EPI: >60 ML/MIN/{1.73_M2}
GLUCOSE P FAST SERPL-MCNC: 112 MG/DL (ref 70–99)
POTASSIUM SERPL-SCNC: 3.9 MMOL/L (ref 3.5–5.1)
PROT SERPL-MCNC: 7.3 G/DL (ref 6.4–8.2)
SODIUM SERPL-SCNC: 139 MMOL/L (ref 136–145)
T3FREE SERPL-MCNC: 3.1 PG/ML (ref 2.3–4.2)
T4 FREE SERPL-MCNC: 1.6 NG/DL (ref 0.9–1.8)
TSH SERPL DL<=0.005 MIU/L-ACNC: 0.82 UIU/ML (ref 0.27–4.2)

## 2023-07-05 ENCOUNTER — OFFICE VISIT (OUTPATIENT)
Dept: ENDOCRINOLOGY | Age: 28
End: 2023-07-05
Payer: COMMERCIAL

## 2023-07-05 VITALS
SYSTOLIC BLOOD PRESSURE: 130 MMHG | TEMPERATURE: 98 F | OXYGEN SATURATION: 98 % | WEIGHT: 199.8 LBS | DIASTOLIC BLOOD PRESSURE: 85 MMHG | RESPIRATION RATE: 14 BRPM | HEIGHT: 61 IN | HEART RATE: 90 BPM | BODY MASS INDEX: 37.72 KG/M2

## 2023-07-05 DIAGNOSIS — E03.9 ACQUIRED HYPOTHYROIDISM: Primary | ICD-10-CM

## 2023-07-05 DIAGNOSIS — E66.9 CLASS 2 OBESITY WITH BODY MASS INDEX (BMI) OF 37.0 TO 37.9 IN ADULT, UNSPECIFIED OBESITY TYPE, UNSPECIFIED WHETHER SERIOUS COMORBIDITY PRESENT: ICD-10-CM

## 2023-07-05 DIAGNOSIS — E28.2 PCOS (POLYCYSTIC OVARIAN SYNDROME): ICD-10-CM

## 2023-07-05 DIAGNOSIS — E55.9 VITAMIN D INSUFFICIENCY: ICD-10-CM

## 2023-07-05 DIAGNOSIS — E06.3 HASHIMOTO'S THYROIDITIS: ICD-10-CM

## 2023-07-05 PROCEDURE — 99214 OFFICE O/P EST MOD 30 MIN: CPT | Performed by: INTERNAL MEDICINE

## 2023-07-05 RX ORDER — VENLAFAXINE HYDROCHLORIDE 37.5 MG/1
37.5 CAPSULE, EXTENDED RELEASE ORAL DAILY
COMMUNITY
Start: 2023-05-30

## 2023-07-05 NOTE — PROGRESS NOTES
bilaterally, No edema  Abdomen: abdomen is soft, non-tender with no masses  Musculoskeletal: normal gait and station and exam of the digits and nails are normal  Neurological: normal coordination and normal general cortical function      Lab Review:    No results found for: WBC, HGB, HCT, MCV, PLT  Lab Results   Component Value Date/Time     07/03/2023 11:06 AM    K 3.9 07/03/2023 11:06 AM     07/03/2023 11:06 AM    CO2 25 07/03/2023 11:06 AM    BUN 5 07/03/2023 11:06 AM    CREATININE 0.7 07/03/2023 11:06 AM    GLUCOSE 84 04/14/2023 10:13 AM    CALCIUM 9.0 07/03/2023 11:06 AM    PROT 7.3 07/03/2023 11:06 AM    LABALBU 4.4 07/03/2023 11:06 AM    BILITOT 0.4 07/03/2023 11:06 AM    ALKPHOS 97 07/03/2023 11:06 AM    AST 13 07/03/2023 11:06 AM    ALT 11 07/03/2023 11:06 AM    LABGLOM >60 07/03/2023 11:06 AM    GFRAA >60 10/04/2022 11:30 AM    AGRATIO 1.5 07/03/2023 11:06 AM    GLOB 2.8 09/28/2021 09:50 AM     Lab Results   Component Value Date/Time    TSHFT4 0.86 08/20/2020 09:52 AM    TSH 0.82 07/03/2023 11:06 AM    FT3 3.1 07/03/2023 11:06 AM     No results found for: LABA1C  No results found for: EAG  Lab Results   Component Value Date/Time    CHOL 194 04/14/2023 10:13 AM     Lab Results   Component Value Date/Time    TRIG 286 04/14/2023 10:13 AM     Lab Results   Component Value Date/Time    HDL 57 04/14/2023 10:13 AM     Lab Results   Component Value Date/Time    LDLCALC 80 04/14/2023 10:13 AM     No results found for: LABVLDL, VLDL  No results found for: CHOLHDLRATIO  No results found for: LABMICR, [id number]  Lab Results   Component Value Date/Time    VITD25 52.3 07/03/2023 11:06 AM        ASSESSMENT/PLAN:    1. Acquired hypothyroidism  Call or DHEAS, testosterone  Previously testosterone increased off BCP  TSH 0.87-1.95-2.53-0.92-0.82  Had carpal tunnel sugery. Levothyroxine (SYNTHROID) 0.088 mg daily  - T3, Free; Future  - T4, Free; Future  - TSH without Reflex; Future    2.  PCOS (polycystic ovarian

## 2023-07-06 LAB
SHBG SERPL-SCNC: 51 NMOL/L (ref 30–135)
TESTOST FREE SERPL-MCNC: 5.4 PG/ML (ref 0.8–7.4)
TESTOST SERPL-MCNC: 40 NG/DL (ref 20–70)

## 2023-07-07 LAB — DHEA-S SERPL-MCNC: 161 UG/DL (ref 65–380)

## 2023-07-26 ENCOUNTER — NURSE TRIAGE (OUTPATIENT)
Dept: OTHER | Facility: CLINIC | Age: 28
End: 2023-07-26

## 2023-07-26 ENCOUNTER — OFFICE VISIT (OUTPATIENT)
Dept: FAMILY MEDICINE CLINIC | Age: 28
End: 2023-07-26
Payer: COMMERCIAL

## 2023-07-26 VITALS
HEART RATE: 94 BPM | SYSTOLIC BLOOD PRESSURE: 129 MMHG | RESPIRATION RATE: 16 BRPM | BODY MASS INDEX: 37.19 KG/M2 | DIASTOLIC BLOOD PRESSURE: 89 MMHG | WEIGHT: 197 LBS | TEMPERATURE: 97.1 F | OXYGEN SATURATION: 98 % | HEIGHT: 61 IN

## 2023-07-26 DIAGNOSIS — J45.909 MILD ASTHMA WITHOUT COMPLICATION, UNSPECIFIED WHETHER PERSISTENT: ICD-10-CM

## 2023-07-26 DIAGNOSIS — R05.1 ACUTE COUGH: Primary | ICD-10-CM

## 2023-07-26 DIAGNOSIS — Z20.2 EXPOSURE TO CHLAMYDIA: ICD-10-CM

## 2023-07-26 DIAGNOSIS — R07.0 THROAT PAIN: ICD-10-CM

## 2023-07-26 LAB — S PYO AG THROAT QL: NORMAL

## 2023-07-26 PROCEDURE — 99214 OFFICE O/P EST MOD 30 MIN: CPT | Performed by: STUDENT IN AN ORGANIZED HEALTH CARE EDUCATION/TRAINING PROGRAM

## 2023-07-26 PROCEDURE — 87880 STREP A ASSAY W/OPTIC: CPT | Performed by: STUDENT IN AN ORGANIZED HEALTH CARE EDUCATION/TRAINING PROGRAM

## 2023-07-26 RX ORDER — DEXTROMETHORPHAN HYDROBROMIDE AND PROMETHAZINE HYDROCHLORIDE 15; 6.25 MG/5ML; MG/5ML
5 SYRUP ORAL 4 TIMES DAILY PRN
Qty: 118 ML | Refills: 0 | Status: SHIPPED | OUTPATIENT
Start: 2023-07-26 | End: 2023-08-02

## 2023-07-26 RX ORDER — AZITHROMYCIN 250 MG/1
250 TABLET, FILM COATED ORAL DAILY
Qty: 1 PACKET | Refills: 0 | Status: SHIPPED | OUTPATIENT
Start: 2023-07-26 | End: 2023-07-31

## 2023-07-26 RX ORDER — BENZONATATE 100 MG/1
100 CAPSULE ORAL 3 TIMES DAILY PRN
Qty: 30 CAPSULE | Refills: 0 | Status: SHIPPED | OUTPATIENT
Start: 2023-07-26 | End: 2023-08-05

## 2023-07-26 ASSESSMENT — PATIENT HEALTH QUESTIONNAIRE - PHQ9
5. POOR APPETITE OR OVEREATING: SEVERAL DAYS
6. FEELING BAD ABOUT YOURSELF - OR THAT YOU ARE A FAILURE OR HAVE LET YOURSELF OR YOUR FAMILY DOWN: 1
8. MOVING OR SPEAKING SO SLOWLY THAT OTHER PEOPLE COULD HAVE NOTICED. OR THE OPPOSITE, BEING SO FIGETY OR RESTLESS THAT YOU HAVE BEEN MOVING AROUND A LOT MORE THAN USUAL: 0
SUM OF ALL RESPONSES TO PHQ QUESTIONS 1-9: 9
10. IF YOU CHECKED OFF ANY PROBLEMS, HOW DIFFICULT HAVE THESE PROBLEMS MADE IT FOR YOU TO DO YOUR WORK, TAKE CARE OF THINGS AT HOME, OR GET ALONG WITH OTHER PEOPLE: 1
2. FEELING DOWN, DEPRESSED OR HOPELESS: 1
7. TROUBLE CONCENTRATING ON THINGS, SUCH AS READING THE NEWSPAPER OR WATCHING TELEVISION: SEVERAL DAYS
9. THOUGHTS THAT YOU WOULD BE BETTER OFF DEAD, OR OF HURTING YOURSELF: NOT AT ALL
10. IF YOU CHECKED OFF ANY PROBLEMS, HOW DIFFICULT HAVE THESE PROBLEMS MADE IT FOR YOU TO DO YOUR WORK, TAKE CARE OF THINGS AT HOME, OR GET ALONG WITH OTHER PEOPLE: SOMEWHAT DIFFICULT
4. FEELING TIRED OR HAVING LITTLE ENERGY: NEARLY EVERY DAY
SUM OF ALL RESPONSES TO PHQ QUESTIONS 1-9: 9
1. LITTLE INTEREST OR PLEASURE IN DOING THINGS: NOT AT ALL
4. FEELING TIRED OR HAVING LITTLE ENERGY: 3
9. THOUGHTS THAT YOU WOULD BE BETTER OFF DEAD, OR OF HURTING YOURSELF: 0
8. MOVING OR SPEAKING SO SLOWLY THAT OTHER PEOPLE COULD HAVE NOTICED. OR THE OPPOSITE - BEING SO FIDGETY OR RESTLESS THAT YOU HAVE BEEN MOVING AROUND A LOT MORE THAN USUAL: NOT AT ALL
5. POOR APPETITE OR OVEREATING: 1
6. FEELING BAD ABOUT YOURSELF - OR THAT YOU ARE A FAILURE OR HAVE LET YOURSELF OR YOUR FAMILY DOWN: SEVERAL DAYS
3. TROUBLE FALLING OR STAYING ASLEEP: MORE THAN HALF THE DAYS
SUM OF ALL RESPONSES TO PHQ QUESTIONS 1-9: 9
7. TROUBLE CONCENTRATING ON THINGS, SUCH AS READING THE NEWSPAPER OR WATCHING TELEVISION: 1
SUM OF ALL RESPONSES TO PHQ QUESTIONS 1-9: 9
SUM OF ALL RESPONSES TO PHQ QUESTIONS 1-9: 9
3. TROUBLE FALLING OR STAYING ASLEEP: 2
SUM OF ALL RESPONSES TO PHQ9 QUESTIONS 1 & 2: 1
2. FEELING DOWN, DEPRESSED OR HOPELESS: SEVERAL DAYS
1. LITTLE INTEREST OR PLEASURE IN DOING THINGS: 0

## 2023-07-26 NOTE — PATIENT INSTRUCTIONS
Use Flonase nasal spray once daily along with nasal sinus rinses 2 to 4 times/day. Cough meds   Stop Delsym - Promethazine DM cough syrup 3 times daily as needed for cough   Tessalon / Benzonatate 100 mg 3 rtimes daily as needed for cough    Increase daily water intake while using expectorant. Can also use bedside humidifier. Antibiotic if worsening  symptoms - fever, green / yellow discharge , > 1 week   azithromycin 250 mg tablet : 500mg PO (2 pills) x 1 day , then 250mg (1 pill daily) x 4 days     Call or return if no improvement in 72 hrs, new or worsening symptoms, fever, chest pain, or worsening cough.      Consider GERD cough   Try pepcid / famotidine daily

## 2023-07-26 NOTE — TELEPHONE ENCOUNTER
Location of patient: [place]    Subjective: Caller states \"I have asthma and usually manage well. Since Sunday, I've had a cough that has gotten pretty bad. I can become out of breath because of the cough. I've now got clogged sinuses and a very sore throat. It's very red. Swallowing irritates my throat and makes the cough worse. \"     Triage RN notes that pt's voice is very gravely and rough. Pt has frequent, barking cough. At home COVID test was negative    Onset:  Sunday, 7/23    Pain Severity:   tongue burning    Temperature:  no fever    What has been tried: cough drops, Deslym and other cough remedies haven't been helpful    Pregnant: No    Recommended disposition: See PCP within 24 Hours    Care advice provided, patient verbalizes understanding; denies any other questions or concerns; instructed to call back for any new or worsening symptoms. Advised pt to be seen by PCP or if no appt available, be sure to go to [facility] or ED. Attention Provider: Thank you for allowing me to participate in the care of your patient. The patient was connected to triage in response to symptoms provided. Please do not respond through this encounter as the response is not directed to a shared pool.     Reason for Disposition   Earache also present    Protocols used: Sore Throat-ADULT-AH

## 2023-07-26 NOTE — PROGRESS NOTES
[place] [address] [address], [address] [place] [address]  Tel: [phone]      2023   SUBJECTIVE/OBJECTIVE  HPI    Nathanael Osgood (:  1995) is a 32 y.o. female, here for evaluation of the following medical concerns:  Chief Complaint   Patient presents with    Cough     X 4 days   Patient is a 32 y.o. female  has a past medical history of Allergic rhinitis, Anxiety, Asthma, Breast cyst, Depression, Dizziness, GERD (gastroesophageal reflux disease), Hashimoto's thyroiditis, Headache, HPV (human papilloma virus) infection, Hypersomnia, and PCOS (polycystic ovarian syndrome). who presents with Cough. Cough  This is a new problem. The current episode started in the past 7 days (). The problem has been gradually worsening. The problem occurs constantly. The cough is Non-productive. Associated symptoms include chest pain (with cough), ear pain (left), headaches, postnasal drip and a sore throat. Pertinent negatives include no chills, ear congestion, fever, heartburn, hemoptysis, myalgias, nasal congestion, rash, rhinorrhea, shortness of breath, sweats or wheezing. The symptoms are aggravated by lying down. Risk factors smoking/tobacco exposure and travel (boyfriend smoked). She has tried a beta-agonist inhaler and prescription cough suppressant for the symptoms. The treatment provided mild relief. There is no history of asthma. No sick contacts. Home COVID-19 test negative. 1 month ago went to [place]. Endorses history of heartburn. Has tried Promethazine. Deslym and OTC cough drops. STD exposure   May have been exposed to Chlamydia       Asthma - Usually exercise induced. Takes albuterol as needed for cough, 6 times a day , yesterday and today. Review of Systems  As above. Prior to Visit Medications    Medication Sig Taking?  Authorizing Provider   promethazine-dextromethorphan (PROMETHAZINE-DM) 6.25-15 MG/5ML syrup Take 5 mLs by mouth 4 times

## 2023-07-27 LAB
C TRACH DNA UR QL NAA+PROBE: NEGATIVE
N GONORRHOEA DNA UR QL NAA+PROBE: NEGATIVE

## 2023-07-28 ENCOUNTER — TELEPHONE (OUTPATIENT)
Dept: FAMILY MEDICINE CLINIC | Age: 28
End: 2023-07-28

## 2023-07-28 NOTE — TELEPHONE ENCOUNTER
----- Message from [employer] sent at 7/28/2023 12:31 PM EDT -----  Subject: Message to Provider    QUESTIONS  Information for Provider? pt was returning a call from the office. No   answer.  Please contact the pt  ---------------------------------------------------------------------------  --------------  Timothy Luke INFO  [phone]; OK to leave message on voicemail  ---------------------------------------------------------------------------  --------------  SCRIPT ANSWERS  undefined

## 2023-08-16 ASSESSMENT — PATIENT HEALTH QUESTIONNAIRE - PHQ9
10. IF YOU CHECKED OFF ANY PROBLEMS, HOW DIFFICULT HAVE THESE PROBLEMS MADE IT FOR YOU TO DO YOUR WORK, TAKE CARE OF THINGS AT HOME, OR GET ALONG WITH OTHER PEOPLE: NOT DIFFICULT AT ALL
8. MOVING OR SPEAKING SO SLOWLY THAT OTHER PEOPLE COULD HAVE NOTICED. OR THE OPPOSITE - BEING SO FIDGETY OR RESTLESS THAT YOU HAVE BEEN MOVING AROUND A LOT MORE THAN USUAL: NOT AT ALL
10. IF YOU CHECKED OFF ANY PROBLEMS, HOW DIFFICULT HAVE THESE PROBLEMS MADE IT FOR YOU TO DO YOUR WORK, TAKE CARE OF THINGS AT HOME, OR GET ALONG WITH OTHER PEOPLE: 0
5. POOR APPETITE OR OVEREATING: NOT AT ALL
2. FEELING DOWN, DEPRESSED OR HOPELESS: 0
6. FEELING BAD ABOUT YOURSELF - OR THAT YOU ARE A FAILURE OR HAVE LET YOURSELF OR YOUR FAMILY DOWN: 0
SUM OF ALL RESPONSES TO PHQ QUESTIONS 1-9: 0
4. FEELING TIRED OR HAVING LITTLE ENERGY: 0
3. TROUBLE FALLING OR STAYING ASLEEP: 0
SUM OF ALL RESPONSES TO PHQ QUESTIONS 1-9: 0
1. LITTLE INTEREST OR PLEASURE IN DOING THINGS: NOT AT ALL
SUM OF ALL RESPONSES TO PHQ9 QUESTIONS 1 & 2: 0
5. POOR APPETITE OR OVEREATING: 0
3. TROUBLE FALLING OR STAYING ASLEEP: NOT AT ALL
7. TROUBLE CONCENTRATING ON THINGS, SUCH AS READING THE NEWSPAPER OR WATCHING TELEVISION: NOT AT ALL
7. TROUBLE CONCENTRATING ON THINGS, SUCH AS READING THE NEWSPAPER OR WATCHING TELEVISION: 0
SUM OF ALL RESPONSES TO PHQ QUESTIONS 1-9: 0
6. FEELING BAD ABOUT YOURSELF - OR THAT YOU ARE A FAILURE OR HAVE LET YOURSELF OR YOUR FAMILY DOWN: NOT AT ALL
4. FEELING TIRED OR HAVING LITTLE ENERGY: NOT AT ALL
8. MOVING OR SPEAKING SO SLOWLY THAT OTHER PEOPLE COULD HAVE NOTICED. OR THE OPPOSITE, BEING SO FIGETY OR RESTLESS THAT YOU HAVE BEEN MOVING AROUND A LOT MORE THAN USUAL: 0
9. THOUGHTS THAT YOU WOULD BE BETTER OFF DEAD, OR OF HURTING YOURSELF: NOT AT ALL
SUM OF ALL RESPONSES TO PHQ QUESTIONS 1-9: 0
SUM OF ALL RESPONSES TO PHQ QUESTIONS 1-9: 0
9. THOUGHTS THAT YOU WOULD BE BETTER OFF DEAD, OR OF HURTING YOURSELF: 0
1. LITTLE INTEREST OR PLEASURE IN DOING THINGS: 0
2. FEELING DOWN, DEPRESSED OR HOPELESS: NOT AT ALL

## 2023-08-16 NOTE — PROGRESS NOTES
2023    Adeola Rowell (:  1995) is a 32 y.o. female, here for a preventive medicine evaluation. Chief Complaint   Patient presents with    Annual Exam    Arm Pain     Lt upper arm, x 4 months         Diagnosis Orders   1. Annual physical exam        2. Pain of left deltoid  Shlomo Neves MD, Orthopedic Surgery (Shoulder; Hip; Knee), [facility]        Tad Apo was seen today for annual exam and arm pain. Diagnoses and all orders for this visit:    Annual physical exam    Pain of left deltoid  -     Juliette Melo MD, Orthopedic Surgery (Shoulder; Hip; Knee), [facility]          Patient Active Problem List   Diagnosis    Hashimoto's thyroiditis    Vitamin D insufficiency    Pain in both knees    Asthma in adult    Persistent depressive disorder    Family history of breast cancer    PCOS (polycystic ovarian syndrome)    Acquired hypothyroidism    Class 2 obesity with body mass index (BMI) of 35.0 to 35.9 in adult    Carpal tunnel syndrome, bilateral    Carpal tunnel syndrome    Vitamin D deficiency    Class 2 obesity with body mass index (BMI) of 37.0 to 37.9 in adult    Class 2 obesity with body mass index (BMI) of 38.0 to 38.9 in adult     Hpi: for months, intermittent pain left arm, lifts weights and sometimes feels weak in the shoulde  Review of Systems   Constitutional: Negative for unexpected weight change. HENT: Negative. Eyes: Negative for redness. Respiratory: Negative for shortness of breath. Cardiovascular: Negative for chest pain and leg swelling. Gastrointestinal: Negative for constipation, diarrhea, nausea and vomiting. Skin: Negative for pallor. Allergic/Immunologic: Negative for immunocompromised state. Psychiatric/Behavioral: Negative for confusion. Prior to Visit Medications    Medication Sig Taking?  Authorizing Provider   venlafaxine (EFFEXOR XR) 37.5 MG extended release capsule Take 1 capsule by mouth daily Yes Historical

## 2023-08-18 ENCOUNTER — OFFICE VISIT (OUTPATIENT)
Dept: FAMILY MEDICINE CLINIC | Age: 28
End: 2023-08-18
Payer: COMMERCIAL

## 2023-08-18 VITALS
HEART RATE: 91 BPM | OXYGEN SATURATION: 99 % | BODY MASS INDEX: 37.31 KG/M2 | WEIGHT: 197.6 LBS | SYSTOLIC BLOOD PRESSURE: 121 MMHG | DIASTOLIC BLOOD PRESSURE: 89 MMHG | HEIGHT: 61 IN

## 2023-08-18 DIAGNOSIS — Z00.00 ANNUAL PHYSICAL EXAM: Primary | ICD-10-CM

## 2023-08-18 DIAGNOSIS — M79.18 PAIN OF LEFT DELTOID: ICD-10-CM

## 2023-08-18 PROCEDURE — 99395 PREV VISIT EST AGE 18-39: CPT | Performed by: FAMILY MEDICINE

## 2023-10-13 DIAGNOSIS — E55.9 VITAMIN D INSUFFICIENCY: ICD-10-CM

## 2023-10-16 RX ORDER — ERGOCALCIFEROL 1.25 MG/1
CAPSULE ORAL
Qty: 12 CAPSULE | Refills: 1 | Status: SHIPPED | OUTPATIENT
Start: 2023-10-16

## 2023-11-28 ENCOUNTER — NURSE TRIAGE (OUTPATIENT)
Dept: OTHER | Facility: CLINIC | Age: 28
End: 2023-11-28

## 2023-11-28 NOTE — TELEPHONE ENCOUNTER
Location of patient: [place]    Subjective: Caller states \"I have yellow/green foul smelling vaginal discharge and think I need to be tested for an STD\"     Current Symptoms: vaginal discharge    Onset: 3 weeks ago; waxing and waning    Associated Symptoms: intermittent abdominal cramping    Pain Severity: 2/10; burning; intermittent    Temperature:  denies    What has been tried:     LMP: NA Pregnant: No    Recommended disposition: See in Office Today    Care advice provided, patient verbalizes understanding; denies any other questions or concerns; instructed to call back for any new or worsening symptoms. Patient/caller agrees to follow-up with PCP     Attention Provider: Thank you for allowing me to participate in the care of your patient. The patient was connected to triage in response to symptoms provided. Please do not respond through this encounter as the response is not directed to a shared pool.     Reason for Disposition   Mild lower abdominal pain comes and goes (cramps) that lasts > 24 hours    Protocols used: Vaginal Discharge-ADULT-[place]

## 2023-11-30 ENCOUNTER — OFFICE VISIT (OUTPATIENT)
Dept: FAMILY MEDICINE CLINIC | Age: 28
End: 2023-11-30
Payer: COMMERCIAL

## 2023-11-30 VITALS
DIASTOLIC BLOOD PRESSURE: 83 MMHG | SYSTOLIC BLOOD PRESSURE: 138 MMHG | OXYGEN SATURATION: 99 % | TEMPERATURE: 97.1 F | HEIGHT: 61 IN | RESPIRATION RATE: 16 BRPM | HEART RATE: 93 BPM | WEIGHT: 203 LBS | BODY MASS INDEX: 38.33 KG/M2

## 2023-11-30 DIAGNOSIS — R30.0 DYSURIA: Primary | ICD-10-CM

## 2023-11-30 DIAGNOSIS — N89.8 VAGINAL DISCHARGE: ICD-10-CM

## 2023-11-30 LAB
BILIRUBIN, POC: NEGATIVE
BLOOD URINE, POC: NEGATIVE
CLARITY, POC: CLEAR
COLOR, POC: YELLOW
GLUCOSE URINE, POC: NEGATIVE
KETONES, POC: NEGATIVE
LEUKOCYTE EST, POC: NEGATIVE
NITRITE, POC: NEGATIVE
PH, POC: 7
PROTEIN, POC: NEGATIVE
SPECIFIC GRAVITY, POC: 1.02
UROBILINOGEN, POC: 0.2

## 2023-11-30 PROCEDURE — 99214 OFFICE O/P EST MOD 30 MIN: CPT | Performed by: STUDENT IN AN ORGANIZED HEALTH CARE EDUCATION/TRAINING PROGRAM

## 2023-11-30 PROCEDURE — 81002 URINALYSIS NONAUTO W/O SCOPE: CPT | Performed by: STUDENT IN AN ORGANIZED HEALTH CARE EDUCATION/TRAINING PROGRAM

## 2023-11-30 RX ORDER — HYDROXYZINE HYDROCHLORIDE 25 MG/1
25 TABLET, FILM COATED ORAL
COMMUNITY
Start: 2023-10-24

## 2023-11-30 RX ORDER — METRONIDAZOLE 500 MG/1
500 TABLET ORAL 2 TIMES DAILY
Qty: 14 TABLET | Refills: 0 | Status: CANCELLED | OUTPATIENT
Start: 2023-11-30 | End: 2023-12-07

## 2023-11-30 SDOH — ECONOMIC STABILITY: FOOD INSECURITY: WITHIN THE PAST 12 MONTHS, YOU WORRIED THAT YOUR FOOD WOULD RUN OUT BEFORE YOU GOT MONEY TO BUY MORE.: NEVER TRUE

## 2023-11-30 SDOH — ECONOMIC STABILITY: FOOD INSECURITY: WITHIN THE PAST 12 MONTHS, THE FOOD YOU BOUGHT JUST DIDN'T LAST AND YOU DIDN'T HAVE MONEY TO GET MORE.: NEVER TRUE

## 2023-11-30 SDOH — ECONOMIC STABILITY: INCOME INSECURITY: HOW HARD IS IT FOR YOU TO PAY FOR THE VERY BASICS LIKE FOOD, HOUSING, MEDICAL CARE, AND HEATING?: NOT HARD AT ALL

## 2023-11-30 ASSESSMENT — COLUMBIA-SUICIDE SEVERITY RATING SCALE - C-SSRS
3. HAVE YOU BEEN THINKING ABOUT HOW YOU MIGHT KILL YOURSELF?: NO
5. HAVE YOU STARTED TO WORK OUT OR WORKED OUT THE DETAILS OF HOW TO KILL YOURSELF? DO YOU INTEND TO CARRY OUT THIS PLAN?: NO
7. DID THIS OCCUR IN THE LAST THREE MONTHS: NO
4. HAVE YOU HAD THESE THOUGHTS AND HAD SOME INTENTION OF ACTING ON THEM?: NO

## 2023-11-30 ASSESSMENT — PATIENT HEALTH QUESTIONNAIRE - PHQ9
4. FEELING TIRED OR HAVING LITTLE ENERGY: 0
SUM OF ALL RESPONSES TO PHQ QUESTIONS 1-9: 0
10. IF YOU CHECKED OFF ANY PROBLEMS, HOW DIFFICULT HAVE THESE PROBLEMS MADE IT FOR YOU TO DO YOUR WORK, TAKE CARE OF THINGS AT HOME, OR GET ALONG WITH OTHER PEOPLE: 0
1. LITTLE INTEREST OR PLEASURE IN DOING THINGS: 0
SUM OF ALL RESPONSES TO PHQ QUESTIONS 1-9: 0
9. THOUGHTS THAT YOU WOULD BE BETTER OFF DEAD, OR OF HURTING YOURSELF: 0
SUM OF ALL RESPONSES TO PHQ QUESTIONS 1-9: 0
8. MOVING OR SPEAKING SO SLOWLY THAT OTHER PEOPLE COULD HAVE NOTICED. OR THE OPPOSITE, BEING SO FIGETY OR RESTLESS THAT YOU HAVE BEEN MOVING AROUND A LOT MORE THAN USUAL: 0
2. FEELING DOWN, DEPRESSED OR HOPELESS: 0
SUM OF ALL RESPONSES TO PHQ QUESTIONS 1-9: 0
SUM OF ALL RESPONSES TO PHQ9 QUESTIONS 1 & 2: 0
5. POOR APPETITE OR OVEREATING: 0
6. FEELING BAD ABOUT YOURSELF - OR THAT YOU ARE A FAILURE OR HAVE LET YOURSELF OR YOUR FAMILY DOWN: 0
3. TROUBLE FALLING OR STAYING ASLEEP: 0
7. TROUBLE CONCENTRATING ON THINGS, SUCH AS READING THE NEWSPAPER OR WATCHING TELEVISION: 0

## 2023-11-30 ASSESSMENT — ENCOUNTER SYMPTOMS
ABDOMINAL PAIN: 0
SORE THROAT: 0
NAUSEA: 0
VOMITING: 0
RECTAL PAIN: 0

## 2023-11-30 NOTE — PATIENT INSTRUCTIONS
- Follow up with OB/GYN if owrsening   Pending urine culture,   Gonorrhea/chlamydia   Vaginal pathogens probe

## 2023-12-01 DIAGNOSIS — N76.0 BACTERIAL VAGINOSIS: Primary | ICD-10-CM

## 2023-12-01 DIAGNOSIS — B96.89 BACTERIAL VAGINOSIS: Primary | ICD-10-CM

## 2023-12-01 LAB
BACTERIA UR CULT: NORMAL
CANDIDA DNA VAG QL NAA+PROBE: ABNORMAL
G VAGINALIS DNA SPEC QL NAA+PROBE: ABNORMAL
T VAGINALIS DNA VAG QL NAA+PROBE: ABNORMAL

## 2023-12-01 RX ORDER — METRONIDAZOLE 500 MG/1
500 TABLET ORAL 2 TIMES DAILY
Qty: 14 TABLET | Refills: 0 | Status: SHIPPED | OUTPATIENT
Start: 2023-12-01 | End: 2023-12-08

## 2023-12-02 LAB
C TRACH DNA CVX QL NAA+PROBE: NEGATIVE
N GONORRHOEA DNA CERV MUCUS QL NAA+PROBE: NEGATIVE

## 2023-12-26 ENCOUNTER — OFFICE VISIT (OUTPATIENT)
Dept: ORTHOPEDIC SURGERY | Age: 28
End: 2023-12-26

## 2023-12-26 VITALS — WEIGHT: 203 LBS | HEIGHT: 61 IN | BODY MASS INDEX: 38.33 KG/M2

## 2023-12-26 DIAGNOSIS — M75.52 SUBACROMIAL BURSITIS OF LEFT SHOULDER JOINT: ICD-10-CM

## 2023-12-26 DIAGNOSIS — M25.512 CHRONIC LEFT SHOULDER PAIN: Primary | ICD-10-CM

## 2023-12-26 DIAGNOSIS — G89.29 CHRONIC LEFT SHOULDER PAIN: Primary | ICD-10-CM

## 2023-12-26 RX ORDER — MELOXICAM 15 MG/1
15 TABLET ORAL DAILY PRN
Qty: 90 TABLET | Refills: 1 | Status: SHIPPED | OUTPATIENT
Start: 2023-12-26

## 2024-01-16 DIAGNOSIS — E06.3 HASHIMOTO'S THYROIDITIS: ICD-10-CM

## 2024-01-16 DIAGNOSIS — E55.9 VITAMIN D INSUFFICIENCY: ICD-10-CM

## 2024-01-16 DIAGNOSIS — E03.9 ACQUIRED HYPOTHYROIDISM: ICD-10-CM

## 2024-01-16 DIAGNOSIS — E28.2 PCOS (POLYCYSTIC OVARIAN SYNDROME): ICD-10-CM

## 2024-01-16 DIAGNOSIS — E53.8 FOLATE DEFICIENCY: ICD-10-CM

## 2024-01-16 LAB
25(OH)D3 SERPL-MCNC: 42.5 NG/ML
FOLATE SERPL-MCNC: >20 NG/ML (ref 4.78–24.2)
VIT B12 SERPL-MCNC: 221 PG/ML (ref 211–911)

## 2024-01-17 ENCOUNTER — OFFICE VISIT (OUTPATIENT)
Dept: ENDOCRINOLOGY | Age: 29
End: 2024-01-17
Payer: COMMERCIAL

## 2024-01-17 VITALS
HEART RATE: 84 BPM | OXYGEN SATURATION: 98 % | BODY MASS INDEX: 38.33 KG/M2 | RESPIRATION RATE: 14 BRPM | DIASTOLIC BLOOD PRESSURE: 76 MMHG | HEIGHT: 61 IN | WEIGHT: 203 LBS | SYSTOLIC BLOOD PRESSURE: 122 MMHG | TEMPERATURE: 98 F

## 2024-01-17 DIAGNOSIS — E66.9 CLASS 2 OBESITY WITH BODY MASS INDEX (BMI) OF 38.0 TO 38.9 IN ADULT, UNSPECIFIED OBESITY TYPE, UNSPECIFIED WHETHER SERIOUS COMORBIDITY PRESENT: ICD-10-CM

## 2024-01-17 DIAGNOSIS — E06.3 HASHIMOTO'S THYROIDITIS: ICD-10-CM

## 2024-01-17 DIAGNOSIS — E55.9 VITAMIN D INSUFFICIENCY: ICD-10-CM

## 2024-01-17 DIAGNOSIS — E03.9 ACQUIRED HYPOTHYROIDISM: Primary | ICD-10-CM

## 2024-01-17 DIAGNOSIS — E28.2 PCOS (POLYCYSTIC OVARIAN SYNDROME): ICD-10-CM

## 2024-01-17 LAB
ALBUMIN SERPL-MCNC: 4.6 G/DL (ref 3.4–5)
ALBUMIN/GLOB SERPL: 1.8 {RATIO} (ref 1.1–2.2)
ALP SERPL-CCNC: 108 U/L (ref 40–129)
ALT SERPL-CCNC: 27 U/L (ref 10–40)
ANION GAP SERPL CALCULATED.3IONS-SCNC: 12 MMOL/L (ref 3–16)
AST SERPL-CCNC: 25 U/L (ref 15–37)
BILIRUB SERPL-MCNC: 0.3 MG/DL (ref 0–1)
BUN SERPL-MCNC: 15 MG/DL (ref 7–20)
CALCIUM SERPL-MCNC: 8.4 MG/DL (ref 8.3–10.6)
CHLORIDE SERPL-SCNC: 100 MMOL/L (ref 99–110)
CO2 SERPL-SCNC: 25 MMOL/L (ref 21–32)
CREAT SERPL-MCNC: 0.6 MG/DL (ref 0.6–1.1)
GFR SERPLBLD CREATININE-BSD FMLA CKD-EPI: >60 ML/MIN/{1.73_M2}
GLUCOSE SERPL-MCNC: 96 MG/DL (ref 70–99)
POTASSIUM SERPL-SCNC: 4.2 MMOL/L (ref 3.5–5.1)
PROT SERPL-MCNC: 7.1 G/DL (ref 6.4–8.2)
SODIUM SERPL-SCNC: 137 MMOL/L (ref 136–145)
T3FREE SERPL-MCNC: 2.9 PG/ML (ref 2.3–4.2)
T4 FREE SERPL-MCNC: 1.2 NG/DL (ref 0.9–1.8)
TSH SERPL DL<=0.005 MIU/L-ACNC: 1.53 UIU/ML (ref 0.27–4.2)

## 2024-01-17 PROCEDURE — 99214 OFFICE O/P EST MOD 30 MIN: CPT | Performed by: INTERNAL MEDICINE

## 2024-01-17 RX ORDER — ERGOCALCIFEROL 1.25 MG/1
CAPSULE ORAL
Qty: 12 CAPSULE | Refills: 1 | Status: SHIPPED | OUTPATIENT
Start: 2024-01-17

## 2024-01-17 RX ORDER — LEVOTHYROXINE SODIUM 88 UG/1
TABLET ORAL
Qty: 90 TABLET | Refills: 1 | Status: SHIPPED | OUTPATIENT
Start: 2024-01-17

## 2024-01-17 NOTE — PROGRESS NOTES
SUBJECTIVE:  Jessica Viveros is a 28 y.o. female who is being evaluated for hypothyroidism.     1. Acquired hypothyroidism  This started in 2009. Patient was diagnosed with hypothyroidism. The problem has been unchanged. Previous thyroid studies include: TSH and free thyroxine.   Patient started medication in 2013. Currently patient is on: levothyroxine. Misses  0 doses a month.    Current complaints: hypersomnia, fatigue, anxiety    Past medical history of hypothyroidism, PCOS, obesity, vitamin D, hypersomnia    2. PCOS (polycystic ovarian syndrome)  Never used Metformin   On birth control  Could not tolerate Aldactone, did not take for a long time  Has hair on the face  Shaves face, BCP helps some  On BCP no period  Off BCP irregular periods  8 yo started periods    3. Vitamin D insufficiency  Has fatigue    4. Hashimoto's thyroiditis  History of obstructive symptoms: difficulty swallowing No, changes in voice/hoarseness No.  History of radiation to patient's neck: No  Resent iodine exposure: No  Family history includes hyperthyroidism, hypothyroidism, Hashmotos, graves  Family history of thyroid cancer: No    5. Obesity  Eats healthier, active       EXAMINATION:   THYROID ULTRASOUND       10/13/2020       COMPARISON:   None.       HISTORY:   ORDERING SYSTEM PROVIDED HISTORY: Hashimoto's thyroiditis   TECHNOLOGIST PROVIDED HISTORY:   Reason for exam:->neck pressure. H/o hashimoto's thyroiditis       Patient on thyroid meds for 8 or 9 years.       FINDINGS:   Right thyroid lobe:  4.0 x 1.6 x 1.5 cm       Left thyroid lobe:  3.9 x 1.1 x 1.1 cm       Isthmus:  2.9 mm.       Thyroid Gland:  Thyroid gland demonstrates homogeneous echotexture and normal   vascularity.       Nodules: No thyroid nodules are present.       Cervical lymphadenopathy: No abnormal lymph nodes in the imaged portions of   the neck.           Impression   Normal sonographic appearance of the thyroid.  No thyroid nodule or finding   of active

## 2024-01-18 LAB
DHEA-S SERPL-MCNC: 169 UG/DL (ref 98.8–340)
SHBG SERPL-SCNC: 38 NMOL/L (ref 30–135)
TESTOST FREE SERPL-MCNC: 4.4 PG/ML (ref 0.8–7.4)
TESTOST SERPL-MCNC: 27 NG/DL (ref 20–70)

## 2024-03-20 ENCOUNTER — NURSE TRIAGE (OUTPATIENT)
Dept: OTHER | Facility: CLINIC | Age: 29
End: 2024-03-20

## 2024-03-20 ENCOUNTER — OFFICE VISIT (OUTPATIENT)
Dept: FAMILY MEDICINE CLINIC | Age: 29
End: 2024-03-20
Payer: COMMERCIAL

## 2024-03-20 VITALS
TEMPERATURE: 97.8 F | BODY MASS INDEX: 39.27 KG/M2 | HEIGHT: 61 IN | OXYGEN SATURATION: 98 % | DIASTOLIC BLOOD PRESSURE: 90 MMHG | HEART RATE: 88 BPM | SYSTOLIC BLOOD PRESSURE: 110 MMHG | WEIGHT: 208 LBS | RESPIRATION RATE: 16 BRPM

## 2024-03-20 DIAGNOSIS — E03.9 ACQUIRED HYPOTHYROIDISM: ICD-10-CM

## 2024-03-20 DIAGNOSIS — G47.419 CONTROLLED NARCOLEPSY: ICD-10-CM

## 2024-03-20 DIAGNOSIS — K21.9 GASTROESOPHAGEAL REFLUX DISEASE WITHOUT ESOPHAGITIS: Primary | ICD-10-CM

## 2024-03-20 PROCEDURE — 99214 OFFICE O/P EST MOD 30 MIN: CPT | Performed by: STUDENT IN AN ORGANIZED HEALTH CARE EDUCATION/TRAINING PROGRAM

## 2024-03-20 RX ORDER — FAMOTIDINE 20 MG/1
20 TABLET, FILM COATED ORAL 2 TIMES DAILY PRN
Qty: 60 TABLET | Refills: 0 | Status: SHIPPED | OUTPATIENT
Start: 2024-03-20

## 2024-03-20 SDOH — ECONOMIC STABILITY: FOOD INSECURITY: WITHIN THE PAST 12 MONTHS, THE FOOD YOU BOUGHT JUST DIDN'T LAST AND YOU DIDN'T HAVE MONEY TO GET MORE.: NEVER TRUE

## 2024-03-20 SDOH — ECONOMIC STABILITY: INCOME INSECURITY: HOW HARD IS IT FOR YOU TO PAY FOR THE VERY BASICS LIKE FOOD, HOUSING, MEDICAL CARE, AND HEATING?: NOT VERY HARD

## 2024-03-20 SDOH — ECONOMIC STABILITY: FOOD INSECURITY: WITHIN THE PAST 12 MONTHS, YOU WORRIED THAT YOUR FOOD WOULD RUN OUT BEFORE YOU GOT MONEY TO BUY MORE.: NEVER TRUE

## 2024-03-20 ASSESSMENT — PATIENT HEALTH QUESTIONNAIRE - PHQ9
10. IF YOU CHECKED OFF ANY PROBLEMS, HOW DIFFICULT HAVE THESE PROBLEMS MADE IT FOR YOU TO DO YOUR WORK, TAKE CARE OF THINGS AT HOME, OR GET ALONG WITH OTHER PEOPLE: NOT DIFFICULT AT ALL
7. TROUBLE CONCENTRATING ON THINGS, SUCH AS READING THE NEWSPAPER OR WATCHING TELEVISION: NOT AT ALL
SUM OF ALL RESPONSES TO PHQ QUESTIONS 1-9: 0
3. TROUBLE FALLING OR STAYING ASLEEP: NOT AT ALL
SUM OF ALL RESPONSES TO PHQ QUESTIONS 1-9: 0
8. MOVING OR SPEAKING SO SLOWLY THAT OTHER PEOPLE COULD HAVE NOTICED. OR THE OPPOSITE, BEING SO FIGETY OR RESTLESS THAT YOU HAVE BEEN MOVING AROUND A LOT MORE THAN USUAL: NOT AT ALL
4. FEELING TIRED OR HAVING LITTLE ENERGY: NOT AT ALL
5. POOR APPETITE OR OVEREATING: NOT AT ALL
SUM OF ALL RESPONSES TO PHQ QUESTIONS 1-9: 0
SUM OF ALL RESPONSES TO PHQ QUESTIONS 1-9: 0
6. FEELING BAD ABOUT YOURSELF - OR THAT YOU ARE A FAILURE OR HAVE LET YOURSELF OR YOUR FAMILY DOWN: NOT AT ALL
9. THOUGHTS THAT YOU WOULD BE BETTER OFF DEAD, OR OF HURTING YOURSELF: NOT AT ALL
SUM OF ALL RESPONSES TO PHQ9 QUESTIONS 1 & 2: 0
2. FEELING DOWN, DEPRESSED OR HOPELESS: NOT AT ALL
1. LITTLE INTEREST OR PLEASURE IN DOING THINGS: NOT AT ALL

## 2024-03-20 NOTE — PATIENT INSTRUCTIONS
Start with Famotidine 20mg once daily and can increase to twice daily if needed   If no change start Nexium (esomeprazole) 20 mg once daily ,  can increase dose 40mg daily or twice daily of 20 mg if needed       Avoid ibuprofen or Meloxicam.     Change your eating habits.  It's best to eat several small meals instead of two or three large meals.  After you eat, wait 2 to 3 hours before you lie down.  Avoid foods that make your symptoms worse. These may include chocolate, mint, alcohol, pepper, spicy foods, high-fat foods, or drinks with caffeine in them, such as tea, coffee, rhoda, or energy drinks. If your symptoms are worse after you eat a certain food, you may want to stop eating it to see if your symptoms get better.

## 2024-03-20 NOTE — PROGRESS NOTES
[facility] -- [facility]  [address]  [address]  [place], [place] [address]  Tel: [phone]      3/20/2024   SUBJECTIVE/OBJECTIVE  HPI    Jessica Viveros (:  1995) is a 28 y.o. female, here for evaluation of the following medical concerns:  Chief Complaint   Patient presents with    Gastroesophageal Reflux     Chest pain and hungry pains      Patient is a 28-year-old female with PCOS, Hashimoto's thyroiditis, ADHD, asthma, presents with GERD symptoms    Syptoms started yesterday. Has tried TUMs with no relief of symptoms. Drank a lot of  night during [employer] day. Recently refilled, Mobic, has not been taking for awhile. Has been taking ibuprofen for jaw pain 6 pills a day.     Gastroesophageal Reflux  She complains of abdominal pain (hunger pain upper abdomen) and chest pain (muscle spasm - few seconds). She reports no belching, no choking, no coughing, no dysphagia, no early satiety, no heartburn, no hoarse voice, no nausea, no sore throat or no wheezing. Chronic diarrhea.. This is a new problem. The current episode started yesterday (All day , no relation to food). The problem occurs constantly. The problem has been unchanged. The heartburn duration is less than a minute. The heartburn is located in the substernum and abdomen. The heartburn is of moderate intensity. The heartburn wakes her from sleep. The symptoms are aggravated by certain foods and ETOH. Risk factors include ETOH use. She has tried an antacid and ETOH reduction for the symptoms.   No chest pain with exertion.  Chest pain does not radiate and does not occur with exertion.  Recently had buttered noodles.  Has been taking meloxicam 15 mg daily as needed for left shoulder pain.     Narcolepsy: On Ritalin 40 mg daily,   Hypothyroidism on levothyroxine 88 mcg.  No side effects    Review of Systems   HENT:  Negative for hoarse voice and sore throat.    Respiratory:  Negative for cough, choking and wheezing.

## 2024-03-20 NOTE — TELEPHONE ENCOUNTER
Location of patient: [place]    Subjective: Caller states \"I think I'm having back reflux\"     Current Symptoms: Yesterday woke up with chest pains and hunger pains, today chest pains in the center of chest off and on, felt very hungry. This happened to her before about 12 years ago. She took medicine for this when it happened, but no dx of reflux. Not taking daily meds for this. Ribs pain as well, that radiates to the back.    Onset: 1 day ago; sudden    Associated Symptoms: reduced activity    Pain Severity: 2/10 right now, can get up to 7/10; feels like someone is trying to rip her chest open, muscle spasm, starts dull; intermittent    Temperature: denies fever     What has been tried: Tums    LMP:  this past weekend  Pregnant: No    Recommended disposition: Go to ED/[facility] Now (Or to Office with PCP Approval)    Care advice provided, patient verbalizes understanding; denies any other questions or concerns; instructed to call back for any new or worsening symptoms.    Patient will try to call [facility] first to see if they will see her. If not, she will go to ED.    Attention Provider:  Thank you for allowing me to participate in the care of your patient.  The patient was connected to triage in response to symptoms provided.   Please do not respond through this encounter as the response is not directed to a shared pool.    Reason for Disposition   Dizziness or lightheadedness    Protocols used: Chest Pain-ADULT-[place]

## 2024-05-29 ENCOUNTER — OFFICE VISIT (OUTPATIENT)
Dept: FAMILY MEDICINE CLINIC | Age: 29
End: 2024-05-29
Payer: COMMERCIAL

## 2024-05-29 VITALS
WEIGHT: 206 LBS | HEART RATE: 87 BPM | SYSTOLIC BLOOD PRESSURE: 100 MMHG | OXYGEN SATURATION: 99 % | HEIGHT: 61 IN | BODY MASS INDEX: 38.89 KG/M2 | DIASTOLIC BLOOD PRESSURE: 80 MMHG

## 2024-05-29 DIAGNOSIS — L98.9 SKIN LESION: Primary | ICD-10-CM

## 2024-05-29 PROCEDURE — 99213 OFFICE O/P EST LOW 20 MIN: CPT | Performed by: STUDENT IN AN ORGANIZED HEALTH CARE EDUCATION/TRAINING PROGRAM

## 2024-05-29 RX ORDER — METHYLPHENIDATE HYDROCHLORIDE 10 MG/1
10 TABLET ORAL
COMMUNITY
Start: 2024-05-16

## 2024-05-29 NOTE — PATIENT INSTRUCTIONS
Tinea corporis, tinea cruris, and tinea versicolor (pityriasis versicolor) (smaller lesions):  Econazole Topical: Apply sufficient amount to cover affected area once daily for 4 weeks  (Miconazole or Clotrimazole? ) over the counter     Bacterial cream - no more 1-2 weeks :  Polysporin    Monitor and call if spreading or worsening

## 2024-05-29 NOTE — PROGRESS NOTES
[facility] -- [facility]  [address]  [address]  [place], [place] [address]  Tel: [phone]      2024   SUBJECTIVE/OBJECTIVE  ELZA Viveros (:  1995) is a 28 y.o. female, here for evaluation of the following medical concerns:  Chief Complaint   Patient presents with    Rash     Rash above left breast, noticed on . Itches and burns with touch. Tried neosporin, no improvement. No changes to detergents or other products. Cat scratch on left forearm also.    Patient is a 28-year-old female with PCOS, Hashimoto's thyroiditis, ADHD, asthma, presents with rash         Rash  Presents with rash above her left upper arm/chest since Friday, 2024, reports it itches and burns with redness.  Has tried Neosporin with no changes any symptoms.  The rash is characterized by redness and itchiness (small bumps on the area.). She was exposed to nothing. Pertinent negatives include no anorexia, congestion, cough, diarrhea, facial edema, fatigue, fever, rhinorrhea, shortness of breath, sore throat or vomiting. Past treatments include antibiotic cream. The treatment provided mild relief.   Narcolepsy: On Ritalin 40 mg daily,   Hypothyroidism on levothyroxine 88 mcg.  No side effects        Review of Systems   Constitutional:  Negative for fatigue and fever.   HENT:  Negative for congestion, rhinorrhea and sore throat.    Respiratory:  Negative for cough and shortness of breath.    Gastrointestinal:  Negative for diarrhea and vomiting.   Skin:  Positive for rash.            Physical exam  /80   Pulse 87   Ht 1.549 m (5' 1\")   Wt 93.4 kg (206 lb)   SpO2 99%   BMI 38.92 kg/m²   Physical Exam  Constitutional:       General: She is not in acute distress.     Appearance: She is normal weight.   HENT:      Head: Normocephalic.      Mouth/Throat:      Mouth: Mucous membranes are moist.   Cardiovascular:      Rate and Rhythm: Normal rate and regular rhythm.   Pulmonary:      Effort:

## 2024-08-09 DIAGNOSIS — E55.9 VITAMIN D INSUFFICIENCY: ICD-10-CM

## 2024-08-12 RX ORDER — ERGOCALCIFEROL 1.25 MG/1
CAPSULE ORAL
Qty: 12 CAPSULE | Refills: 0 | Status: SHIPPED | OUTPATIENT
Start: 2024-08-12

## 2024-08-14 ENCOUNTER — OFFICE VISIT (OUTPATIENT)
Dept: URGENT CARE | Age: 29
End: 2024-08-14

## 2024-08-14 VITALS
WEIGHT: 207 LBS | DIASTOLIC BLOOD PRESSURE: 85 MMHG | TEMPERATURE: 98.4 F | HEART RATE: 101 BPM | BODY MASS INDEX: 39.08 KG/M2 | HEIGHT: 61 IN | SYSTOLIC BLOOD PRESSURE: 123 MMHG | OXYGEN SATURATION: 98 %

## 2024-08-14 DIAGNOSIS — H69.93 DYSFUNCTION OF BOTH EUSTACHIAN TUBES: Primary | ICD-10-CM

## 2024-08-14 DIAGNOSIS — R03.0 ELEVATED BP WITHOUT DIAGNOSIS OF HYPERTENSION: ICD-10-CM

## 2024-08-14 RX ORDER — PREDNISONE 20 MG/1
20 TABLET ORAL 2 TIMES DAILY
Qty: 10 TABLET | Refills: 0 | Status: SHIPPED | OUTPATIENT
Start: 2024-08-14 | End: 2024-08-19

## 2024-08-14 RX ORDER — CLINDAMYCIN PHOSPHATE 10 UG/ML
LOTION TOPICAL
COMMUNITY
Start: 2024-08-08

## 2024-08-14 RX ORDER — METRONIDAZOLE 7.5 MG/G
GEL VAGINAL
COMMUNITY
Start: 2024-07-19

## 2024-08-14 RX ORDER — KETOCONAZOLE 20 MG/ML
SHAMPOO TOPICAL
COMMUNITY
Start: 2024-08-08

## 2024-08-14 ASSESSMENT — ENCOUNTER SYMPTOMS
DIARRHEA: 0
EYE REDNESS: 0
SHORTNESS OF BREATH: 0
NAUSEA: 0
VOMITING: 0
SORE THROAT: 0
EYE PAIN: 0
EYE DISCHARGE: 0
ABDOMINAL PAIN: 0
COUGH: 0

## 2024-08-14 NOTE — PROGRESS NOTES
Jessica Viveros (: 1995) is a 28 y.o. female, New patient, here for evaluation of the following chief complaint(s):  Ear Fullness (Bilateral ear pressure, worse in R ear - \"everyone sounds like robots.\" Started over the weekend. )      ASSESSMENT/PLAN:    ICD-10-CM    1. Dysfunction of both eustachian tubes  H69.93 predniSONE (DELTASONE) 20 MG tablet      2. Elevated BP without diagnosis of hypertension  R03.0 Non [facility] - External Referral To Primary Care          - Pt did not want any testing done. Low concern for strep pharyngitis, otitis media, otitis externa, bacterial pneumonia, bronchitis, sinusitis or sepsis.  - Pt to drink lots of fluids  - Pt to take medication as prescribed  - Pt ok to take tylenol as needed  - Pt to call if any symptoms worsen or follow up with PCP if not better in 5 days  - Pt to go to ER if have shortness of breath, chest pain or sudden fever    Discussed PCP follow up for persisting or worsening symptoms, or to return to the clinic if unable to obtain PCP follow up for worsening symptoms.    The patient tolerated their visit well. The patient and/or the family were informed of the results of any tests, a time was given to answer questions, a plan was proposed and they agreed with plan. Reviewed AVS with treatment instructions and answered questions - pt/family expresses understanding and agreement with the discussed treatment plan and AVS instructions.      SUBJECTIVE/OBJECTIVE:  HPI:   28 y.o. female presents for complaint of pt states 3 days ago she started with right ear fullness and muffled hearing. Pt denies any drainage from bilateral ears.    Admits nasal congestion.  Denies fevers, body aches, cough, headache, sore throat, abdominal pain, vomiting or diarrhea.     Pt states she tried using an OTC swimmer ear eardrop in her ears but it did not seem to help.         History provided by:  Patient   used: No        VITAL SIGNS  Vitals:    24 1327

## 2024-09-25 LAB
CHOLEST SERPL-MCNC: 181 MG/DL (ref 0–199)
GLUCOSE SERPL-MCNC: 91 MG/DL (ref 70–99)
HDLC SERPL-MCNC: 45 MG/DL (ref 40–60)
LDLC SERPL CALC-MCNC: 116 MG/DL
TRIGL SERPL-MCNC: 102 MG/DL (ref 0–150)

## 2024-10-14 DIAGNOSIS — E28.2 PCOS (POLYCYSTIC OVARIAN SYNDROME): ICD-10-CM

## 2024-10-14 DIAGNOSIS — E55.9 VITAMIN D INSUFFICIENCY: ICD-10-CM

## 2024-10-14 DIAGNOSIS — E03.9 ACQUIRED HYPOTHYROIDISM: ICD-10-CM

## 2024-10-14 LAB
25(OH)D3 SERPL-MCNC: 26 NG/ML
ALBUMIN SERPL-MCNC: 4.4 G/DL (ref 3.4–5)
ALBUMIN/GLOB SERPL: 1.8 {RATIO} (ref 1.1–2.2)
ALP SERPL-CCNC: 98 U/L (ref 40–129)
ALT SERPL-CCNC: 45 U/L (ref 10–40)
ANION GAP SERPL CALCULATED.3IONS-SCNC: 12 MMOL/L (ref 3–16)
AST SERPL-CCNC: 47 U/L (ref 15–37)
BILIRUB SERPL-MCNC: 0.3 MG/DL (ref 0–1)
BUN SERPL-MCNC: 10 MG/DL (ref 7–20)
CALCIUM SERPL-MCNC: 9 MG/DL (ref 8.3–10.6)
CHLORIDE SERPL-SCNC: 102 MMOL/L (ref 99–110)
CO2 SERPL-SCNC: 24 MMOL/L (ref 21–32)
CREAT SERPL-MCNC: 0.6 MG/DL (ref 0.6–1.1)
GFR SERPLBLD CREATININE-BSD FMLA CKD-EPI: >90 ML/MIN/{1.73_M2}
GLUCOSE SERPL-MCNC: 82 MG/DL (ref 70–99)
POTASSIUM SERPL-SCNC: 4.3 MMOL/L (ref 3.5–5.1)
PROT SERPL-MCNC: 6.8 G/DL (ref 6.4–8.2)
SODIUM SERPL-SCNC: 138 MMOL/L (ref 136–145)
T4 FREE SERPL-MCNC: 1.1 NG/DL (ref 0.9–1.8)
TSH SERPL DL<=0.005 MIU/L-ACNC: 2.03 UIU/ML (ref 0.27–4.2)

## 2024-10-16 ENCOUNTER — OFFICE VISIT (OUTPATIENT)
Dept: ENDOCRINOLOGY | Age: 29
End: 2024-10-16
Payer: COMMERCIAL

## 2024-10-16 VITALS
TEMPERATURE: 98 F | DIASTOLIC BLOOD PRESSURE: 78 MMHG | HEIGHT: 61 IN | BODY MASS INDEX: 40.22 KG/M2 | OXYGEN SATURATION: 98 % | HEART RATE: 89 BPM | RESPIRATION RATE: 14 BRPM | SYSTOLIC BLOOD PRESSURE: 115 MMHG | WEIGHT: 213 LBS

## 2024-10-16 DIAGNOSIS — E66.01 CLASS 3 SEVERE OBESITY WITH SERIOUS COMORBIDITY AND BODY MASS INDEX (BMI) OF 40.0 TO 44.9 IN ADULT, UNSPECIFIED OBESITY TYPE: ICD-10-CM

## 2024-10-16 DIAGNOSIS — E03.9 ACQUIRED HYPOTHYROIDISM: Primary | ICD-10-CM

## 2024-10-16 DIAGNOSIS — E28.2 PCOS (POLYCYSTIC OVARIAN SYNDROME): ICD-10-CM

## 2024-10-16 DIAGNOSIS — E55.9 VITAMIN D INSUFFICIENCY: ICD-10-CM

## 2024-10-16 DIAGNOSIS — E06.3 HASHIMOTO'S THYROIDITIS: ICD-10-CM

## 2024-10-16 DIAGNOSIS — R79.89 ELEVATED LIVER FUNCTION TESTS: ICD-10-CM

## 2024-10-16 DIAGNOSIS — E66.813 CLASS 3 SEVERE OBESITY WITH SERIOUS COMORBIDITY AND BODY MASS INDEX (BMI) OF 40.0 TO 44.9 IN ADULT, UNSPECIFIED OBESITY TYPE: ICD-10-CM

## 2024-10-16 PROCEDURE — 99214 OFFICE O/P EST MOD 30 MIN: CPT | Performed by: INTERNAL MEDICINE

## 2024-10-16 RX ORDER — BUPROPION HYDROCHLORIDE 150 MG/1
150 TABLET ORAL EVERY MORNING
COMMUNITY

## 2024-10-16 NOTE — PROGRESS NOTES
SUBJECTIVE:  Jessica Viveros is a 29 y.o. female who is being evaluated for hypothyroidism.     1. Acquired hypothyroidism  This started in 2009. Patient was diagnosed with hypothyroidism. The problem has been unchanged. Previous thyroid studies include: TSH and free thyroxine.   Patient started medication in 2013. Currently patient is on: levothyroxine. Misses  0 doses a month.    Current complaints: hypersomnia, fatigue, anxiety    Past medical history of hypothyroidism, PCOS, obesity, vitamin D, hypersomnia    2. PCOS (polycystic ovarian syndrome)  Never used Metformin   On birth control  Could not tolerate Aldactone, did not take for a long time  Has hair on the face  Shaves face, BCP helps some  On BCP no period  Off BCP irregular periods  10 yo started periods    3. Vitamin D insufficiency  Has fatigue    4. Hashimoto's thyroiditis  History of obstructive symptoms: difficulty swallowing No, changes in voice/hoarseness No.  History of radiation to patient's neck: No  Resent iodine exposure: No  Family history includes hyperthyroidism, hypothyroidism, Hashmotos, graves  Family history of thyroid cancer: No    5. Obesity  Eats healthier, active    6.  Elevated liver function tests  No nausea or vomiting    EXAMINATION:   THYROID ULTRASOUND       10/13/2020       COMPARISON:   None.       HISTORY:   ORDERING SYSTEM PROVIDED HISTORY: Hashimoto's thyroiditis   TECHNOLOGIST PROVIDED HISTORY:   Reason for exam:->neck pressure. H/o hashimoto's thyroiditis       Patient on thyroid meds for 8 or 9 years.       FINDINGS:   Right thyroid lobe:  4.0 x 1.6 x 1.5 cm       Left thyroid lobe:  3.9 x 1.1 x 1.1 cm       Isthmus:  2.9 mm.       Thyroid Gland:  Thyroid gland demonstrates homogeneous echotexture and normal   vascularity.       Nodules: No thyroid nodules are present.       Cervical lymphadenopathy: No abnormal lymph nodes in the imaged portions of   the neck.           Impression   Normal sonographic appearance of the

## 2024-11-03 ENCOUNTER — NURSE TRIAGE (OUTPATIENT)
Dept: OTHER | Facility: CLINIC | Age: 29
End: 2024-11-03

## 2024-11-04 ENCOUNTER — PATIENT MESSAGE (OUTPATIENT)
Dept: FAMILY MEDICINE CLINIC | Age: 29
End: 2024-11-04

## 2024-11-04 ENCOUNTER — OFFICE VISIT (OUTPATIENT)
Dept: FAMILY MEDICINE CLINIC | Age: 29
End: 2024-11-04
Payer: COMMERCIAL

## 2024-11-04 VITALS
HEART RATE: 101 BPM | TEMPERATURE: 97.5 F | WEIGHT: 213 LBS | RESPIRATION RATE: 16 BRPM | BODY MASS INDEX: 40.27 KG/M2 | OXYGEN SATURATION: 98 % | SYSTOLIC BLOOD PRESSURE: 130 MMHG | DIASTOLIC BLOOD PRESSURE: 87 MMHG

## 2024-11-04 DIAGNOSIS — U07.1 COVID: Primary | ICD-10-CM

## 2024-11-04 DIAGNOSIS — H69.93 DYSFUNCTION OF BOTH EUSTACHIAN TUBES: ICD-10-CM

## 2024-11-04 DIAGNOSIS — R05.1 ACUTE COUGH: ICD-10-CM

## 2024-11-04 DIAGNOSIS — R09.82 POST-NASAL DRIP: ICD-10-CM

## 2024-11-04 PROCEDURE — 99213 OFFICE O/P EST LOW 20 MIN: CPT | Performed by: NURSE PRACTITIONER

## 2024-11-04 RX ORDER — FLUTICASONE PROPIONATE 50 MCG
1 SPRAY, SUSPENSION (ML) NASAL 2 TIMES DAILY
Qty: 1 EACH | Refills: 2 | Status: SHIPPED | OUTPATIENT
Start: 2024-11-04

## 2024-11-04 RX ORDER — BENZONATATE 200 MG/1
200 CAPSULE ORAL 3 TIMES DAILY PRN
Qty: 30 CAPSULE | Refills: 0 | Status: SHIPPED | OUTPATIENT
Start: 2024-11-04 | End: 2024-11-14

## 2024-11-04 ASSESSMENT — ANXIETY QUESTIONNAIRES
6. BECOMING EASILY ANNOYED OR IRRITABLE: NOT AT ALL
7. FEELING AFRAID AS IF SOMETHING AWFUL MIGHT HAPPEN: NOT AT ALL
5. BEING SO RESTLESS THAT IT IS HARD TO SIT STILL: NOT AT ALL
3. WORRYING TOO MUCH ABOUT DIFFERENT THINGS: NOT AT ALL
IF YOU CHECKED OFF ANY PROBLEMS ON THIS QUESTIONNAIRE, HOW DIFFICULT HAVE THESE PROBLEMS MADE IT FOR YOU TO DO YOUR WORK, TAKE CARE OF THINGS AT HOME, OR GET ALONG WITH OTHER PEOPLE: NOT DIFFICULT AT ALL
1. FEELING NERVOUS, ANXIOUS, OR ON EDGE: NOT AT ALL
4. TROUBLE RELAXING: NOT AT ALL
2. NOT BEING ABLE TO STOP OR CONTROL WORRYING: NOT AT ALL
GAD7 TOTAL SCORE: 0

## 2024-11-04 ASSESSMENT — PATIENT HEALTH QUESTIONNAIRE - PHQ9
5. POOR APPETITE OR OVEREATING: NOT AT ALL
DEPRESSION UNABLE TO ASSESS: FUNCTIONAL CAPACITY MOTIVATION LIMITS ACCURACY
DEPRESSION UNABLE TO ASSESS: FUNCTIONAL CAPACITY MOTIVATION LIMITS ACCURACY
SUM OF ALL RESPONSES TO PHQ QUESTIONS 1-9: 0
10. IF YOU CHECKED OFF ANY PROBLEMS, HOW DIFFICULT HAVE THESE PROBLEMS MADE IT FOR YOU TO DO YOUR WORK, TAKE CARE OF THINGS AT HOME, OR GET ALONG WITH OTHER PEOPLE: NOT DIFFICULT AT ALL
SUM OF ALL RESPONSES TO PHQ QUESTIONS 1-9: 0
SUM OF ALL RESPONSES TO PHQ QUESTIONS 1-9: 0
SUM OF ALL RESPONSES TO PHQ9 QUESTIONS 1 & 2: 0
SUM OF ALL RESPONSES TO PHQ QUESTIONS 1-9: 0
4. FEELING TIRED OR HAVING LITTLE ENERGY: NOT AT ALL
7. TROUBLE CONCENTRATING ON THINGS, SUCH AS READING THE NEWSPAPER OR WATCHING TELEVISION: NOT AT ALL
9. THOUGHTS THAT YOU WOULD BE BETTER OFF DEAD, OR OF HURTING YOURSELF: NOT AT ALL
SUM OF ALL RESPONSES TO PHQ9 QUESTIONS 1 & 2: 0
1. LITTLE INTEREST OR PLEASURE IN DOING THINGS: NOT AT ALL
6. FEELING BAD ABOUT YOURSELF - OR THAT YOU ARE A FAILURE OR HAVE LET YOURSELF OR YOUR FAMILY DOWN: NOT AT ALL
1. LITTLE INTEREST OR PLEASURE IN DOING THINGS: NOT AT ALL
2. FEELING DOWN, DEPRESSED OR HOPELESS: NOT AT ALL
SUM OF ALL RESPONSES TO PHQ QUESTIONS 1-9: 0
2. FEELING DOWN, DEPRESSED OR HOPELESS: NOT AT ALL
3. TROUBLE FALLING OR STAYING ASLEEP: NOT AT ALL
SUM OF ALL RESPONSES TO PHQ QUESTIONS 1-9: 0
8. MOVING OR SPEAKING SO SLOWLY THAT OTHER PEOPLE COULD HAVE NOTICED. OR THE OPPOSITE, BEING SO FIGETY OR RESTLESS THAT YOU HAVE BEEN MOVING AROUND A LOT MORE THAN USUAL: NOT AT ALL

## 2024-11-04 ASSESSMENT — ENCOUNTER SYMPTOMS
SHORTNESS OF BREATH: 0
COUGH: 1
SINUS PAIN: 1

## 2024-11-04 NOTE — PATIENT INSTRUCTIONS
Patient requesting follow-up for sore throat.  Patient was treated for presumptive group a strep less than a month ago 03/10/2023.  Patient was treated with amoxicillin based on center score.  If sore throat is persisting at this time a face-to-face evaluation is indicated Tessalon perles sent to pharmacy as needed for the cough  Start Mucinex and Zyrtec over the counter   Flonase two sprays up each nostril daily  Continue albuterol as needed  Monitor for shortness of breath or worsening of symptoms please call  Mask around other until 10 day of symptoms

## 2024-11-04 NOTE — PROGRESS NOTES
2024     Chief Complaint   Patient presents with    Other     POSITIVE FOR COVID FRIDAY        Jessica Viveros (:  1995) is a 29 y.o. female, here for evaluation of the following medical concerns:    HPI  Positive home COVID test Friday  Symptoms headache, fatigue, sweats, cough, post nasal drainage, b/l ear pressure  No fever or shortness of breath. Symptoms are gradually improving  Does have hx of exercise induced asthma and uses prn albuterol  Symptoms started last Tuesday into Wednesday with headache. Was feeling fatigue since last Monday  Yesterday she noticed lost of sense of smell    Review of Systems   Constitutional:  Positive for activity change and diaphoresis. Negative for chills, fatigue and fever.   HENT:  Positive for postnasal drip and sinus pain.         Anosmia- first noticed it yesterday   Respiratory:  Positive for cough. Negative for shortness of breath.    Cardiovascular:  Negative for chest pain and leg swelling.   Neurological:  Negative for dizziness and headaches.   All other systems reviewed and are negative.      Prior to Visit Medications    Medication Sig Taking? Authorizing Provider   benzonatate (TESSALON) 200 MG capsule Take 1 capsule by mouth 3 times daily as needed for Cough Yes Litzy Durbin APRN - CNP   fluticasone (FLONASE) 50 MCG/ACT nasal spray 1 spray by Each Nostril route 2 times daily Yes Litzy Durbin APRN - CNP   buPROPion (WELLBUTRIN XL) 150 MG extended release tablet Take 1 tablet by mouth every morning Yes Aliza Salguero MD   clindamycin (CLEOCIN T) 1 % lotion apply to the affected area two times a day until clinically resolved Yes Aliza Salguero MD   ketoconazole (NIZORAL) 2 % shampoo Apply and massage into scalp 1-2 times weekly and massage into face 1-2 times per week. Allow to rest 2- 5 mins before rinsing. Yes ProviderAliza MD   vitamin D (ERGOCALCIFEROL) 1.25 MG (51920 UT) CAPS capsule TAKE ONE CAPSULE BY

## 2024-12-16 DIAGNOSIS — E03.9 ACQUIRED HYPOTHYROIDISM: ICD-10-CM

## 2024-12-16 RX ORDER — LEVOTHYROXINE SODIUM 88 UG/1
TABLET ORAL
Qty: 90 TABLET | Refills: 0 | Status: SHIPPED | OUTPATIENT
Start: 2024-12-16

## 2025-02-17 DIAGNOSIS — E28.2 PCOS (POLYCYSTIC OVARIAN SYNDROME): ICD-10-CM

## 2025-02-17 DIAGNOSIS — E55.9 VITAMIN D INSUFFICIENCY: ICD-10-CM

## 2025-02-17 DIAGNOSIS — E03.9 ACQUIRED HYPOTHYROIDISM: ICD-10-CM

## 2025-02-17 DIAGNOSIS — E06.3 HASHIMOTO'S THYROIDITIS: ICD-10-CM

## 2025-02-17 DIAGNOSIS — R79.89 ELEVATED LIVER FUNCTION TESTS: ICD-10-CM

## 2025-02-18 PROBLEM — E66.01 CLASS 3 SEVERE OBESITY WITH SERIOUS COMORBIDITY AND BODY MASS INDEX (BMI) OF 40.0 TO 44.9 IN ADULT: Status: ACTIVE | Noted: 2025-02-18

## 2025-02-18 PROBLEM — E66.813 CLASS 3 SEVERE OBESITY WITH SERIOUS COMORBIDITY AND BODY MASS INDEX (BMI) OF 40.0 TO 44.9 IN ADULT: Status: ACTIVE | Noted: 2025-02-18

## 2025-02-18 LAB
25(OH)D3 SERPL-MCNC: 41.2 NG/ML
T4 FREE SERPL-MCNC: 1.2 NG/DL (ref 0.9–1.8)
TSH SERPL DL<=0.005 MIU/L-ACNC: 1.14 UIU/ML (ref 0.27–4.2)

## 2025-02-19 ENCOUNTER — OFFICE VISIT (OUTPATIENT)
Dept: ENDOCRINOLOGY | Age: 30
End: 2025-02-19
Payer: COMMERCIAL

## 2025-02-19 VITALS
SYSTOLIC BLOOD PRESSURE: 126 MMHG | HEART RATE: 84 BPM | DIASTOLIC BLOOD PRESSURE: 73 MMHG | RESPIRATION RATE: 16 BRPM | WEIGHT: 209.4 LBS | OXYGEN SATURATION: 98 % | TEMPERATURE: 98 F | BODY MASS INDEX: 39.53 KG/M2 | HEIGHT: 61 IN

## 2025-02-19 DIAGNOSIS — E06.3 HASHIMOTO'S THYROIDITIS: ICD-10-CM

## 2025-02-19 DIAGNOSIS — E03.9 ACQUIRED HYPOTHYROIDISM: Primary | ICD-10-CM

## 2025-02-19 DIAGNOSIS — E55.9 VITAMIN D INSUFFICIENCY: ICD-10-CM

## 2025-02-19 DIAGNOSIS — R79.89 ELEVATED LIVER FUNCTION TESTS: ICD-10-CM

## 2025-02-19 DIAGNOSIS — E66.812 CLASS 2 SEVERE OBESITY WITH SERIOUS COMORBIDITY AND BODY MASS INDEX (BMI) OF 39.0 TO 39.9 IN ADULT, UNSPECIFIED OBESITY TYPE: ICD-10-CM

## 2025-02-19 DIAGNOSIS — E66.01 CLASS 2 SEVERE OBESITY WITH SERIOUS COMORBIDITY AND BODY MASS INDEX (BMI) OF 39.0 TO 39.9 IN ADULT, UNSPECIFIED OBESITY TYPE: ICD-10-CM

## 2025-02-19 DIAGNOSIS — E28.2 PCOS (POLYCYSTIC OVARIAN SYNDROME): ICD-10-CM

## 2025-02-19 PROCEDURE — 99214 OFFICE O/P EST MOD 30 MIN: CPT | Performed by: INTERNAL MEDICINE

## 2025-02-19 RX ORDER — LEVOTHYROXINE SODIUM 88 UG/1
TABLET ORAL
Qty: 90 TABLET | Refills: 1 | Status: SHIPPED | OUTPATIENT
Start: 2025-02-19

## 2025-02-19 RX ORDER — ERGOCALCIFEROL 1.25 MG/1
CAPSULE, LIQUID FILLED ORAL
Qty: 12 CAPSULE | Refills: 1 | Status: SHIPPED | OUTPATIENT
Start: 2025-02-19

## 2025-02-19 NOTE — PROGRESS NOTES
SUBJECTIVE:  Jessica Viveros is a 29 y.o. female who is being evaluated for hypothyroidism.     1. Acquired hypothyroidism  This started in 2009. Patient was diagnosed with hypothyroidism. The problem has been unchanged. Previous thyroid studies include: TSH and free thyroxine.   Patient started medication in 2013. Currently patient is on: levothyroxine. Misses  0 doses a month.    Current complaints: hypersomnia, fatigue, anxiety    Past medical history of hypothyroidism, PCOS, obesity, vitamin D, hypersomnia    2. PCOS (polycystic ovarian syndrome)  Never used Metformin   On birth control  Could not tolerate Aldactone, did not take for a long time  Has hair on the face  Shaves face, BCP helps some  On BCP no period  Off BCP irregular periods  8 yo started periods    3. Vitamin D insufficiency  Has fatigue    4. Hashimoto's thyroiditis  History of obstructive symptoms: difficulty swallowing No, changes in voice/hoarseness No.  History of radiation to patient's neck: No  Resent iodine exposure: No  Family history includes hyperthyroidism, hypothyroidism, Hashmotos, graves  Family history of thyroid cancer: No    5. Obesity  Eats healthier, active    6.  Elevated liver function tests  No nausea or vomiting    EXAMINATION:   THYROID ULTRASOUND       10/13/2020       COMPARISON:   None.       HISTORY:   ORDERING SYSTEM PROVIDED HISTORY: Hashimoto's thyroiditis   TECHNOLOGIST PROVIDED HISTORY:   Reason for exam:->neck pressure. H/o hashimoto's thyroiditis       Patient on thyroid meds for 8 or 9 years.       FINDINGS:   Right thyroid lobe:  4.0 x 1.6 x 1.5 cm       Left thyroid lobe:  3.9 x 1.1 x 1.1 cm       Isthmus:  2.9 mm.       Thyroid Gland:  Thyroid gland demonstrates homogeneous echotexture and normal   vascularity.       Nodules: No thyroid nodules are present.       Cervical lymphadenopathy: No abnormal lymph nodes in the imaged portions of   the neck.           Impression   Normal sonographic appearance of the

## 2025-03-26 PROBLEM — Z13.71: Status: ACTIVE | Noted: 2025-03-26

## 2025-03-26 PROBLEM — F43.21 GRIEVING: Status: ACTIVE | Noted: 2025-03-26

## 2025-03-26 PROBLEM — Z98.82 HISTORY OF BREAST AUGMENTATION: Status: ACTIVE | Noted: 2025-03-26

## 2025-03-26 PROBLEM — Z30.41 ENCOUNTER FOR SURVEILLANCE OF CONTRACEPTIVE PILLS: Status: ACTIVE | Noted: 2025-03-26

## 2025-03-27 ENCOUNTER — RESULTS FOLLOW-UP (OUTPATIENT)
Dept: OBGYN CLINIC | Age: 30
End: 2025-03-27

## 2025-04-28 SDOH — HEALTH STABILITY: PHYSICAL HEALTH: ON AVERAGE, HOW MANY MINUTES DO YOU ENGAGE IN EXERCISE AT THIS LEVEL?: 60 MIN

## 2025-04-28 SDOH — HEALTH STABILITY: PHYSICAL HEALTH: ON AVERAGE, HOW MANY DAYS PER WEEK DO YOU ENGAGE IN MODERATE TO STRENUOUS EXERCISE (LIKE A BRISK WALK)?: 2 DAYS

## 2025-04-29 ENCOUNTER — OFFICE VISIT (OUTPATIENT)
Dept: FAMILY MEDICINE CLINIC | Age: 30
End: 2025-04-29
Payer: COMMERCIAL

## 2025-04-29 VITALS
RESPIRATION RATE: 15 BRPM | SYSTOLIC BLOOD PRESSURE: 124 MMHG | BODY MASS INDEX: 38.1 KG/M2 | WEIGHT: 201.8 LBS | DIASTOLIC BLOOD PRESSURE: 88 MMHG | TEMPERATURE: 97.7 F | OXYGEN SATURATION: 97 % | HEART RATE: 87 BPM | HEIGHT: 61 IN

## 2025-04-29 DIAGNOSIS — E06.3 HASHIMOTO'S THYROIDITIS: ICD-10-CM

## 2025-04-29 DIAGNOSIS — Z76.89 ENCOUNTER TO ESTABLISH CARE: Primary | ICD-10-CM

## 2025-04-29 DIAGNOSIS — E66.09 CLASS 2 OBESITY DUE TO EXCESS CALORIES WITHOUT SERIOUS COMORBIDITY WITH BODY MASS INDEX (BMI) OF 38.0 TO 38.9 IN ADULT: ICD-10-CM

## 2025-04-29 DIAGNOSIS — E28.2 PCOS (POLYCYSTIC OVARIAN SYNDROME): ICD-10-CM

## 2025-04-29 DIAGNOSIS — E66.812 CLASS 2 OBESITY DUE TO EXCESS CALORIES WITHOUT SERIOUS COMORBIDITY WITH BODY MASS INDEX (BMI) OF 38.0 TO 38.9 IN ADULT: ICD-10-CM

## 2025-04-29 DIAGNOSIS — E55.9 VITAMIN D DEFICIENCY: ICD-10-CM

## 2025-04-29 DIAGNOSIS — G47.10 HYPERSOMNIA: ICD-10-CM

## 2025-04-29 DIAGNOSIS — F34.1 PERSISTENT DEPRESSIVE DISORDER: ICD-10-CM

## 2025-04-29 PROCEDURE — 99214 OFFICE O/P EST MOD 30 MIN: CPT | Performed by: SURGERY

## 2025-04-29 RX ORDER — TIMOLOL MALEATE 5 MG/ML
SOLUTION/ DROPS OPHTHALMIC
COMMUNITY
Start: 2025-04-27

## 2025-04-29 RX ORDER — VENLAFAXINE HYDROCHLORIDE 75 MG/1
CAPSULE, EXTENDED RELEASE ORAL
COMMUNITY
Start: 2025-02-04

## 2025-04-29 RX ORDER — BUPROPION HYDROCHLORIDE 150 MG/1
TABLET ORAL
COMMUNITY
End: 2025-04-29

## 2025-04-29 RX ORDER — PRAZOSIN HYDROCHLORIDE 2 MG/1
CAPSULE ORAL
COMMUNITY
Start: 2025-02-21

## 2025-04-29 SDOH — ECONOMIC STABILITY: FOOD INSECURITY: WITHIN THE PAST 12 MONTHS, YOU WORRIED THAT YOUR FOOD WOULD RUN OUT BEFORE YOU GOT MONEY TO BUY MORE.: NEVER TRUE

## 2025-04-29 SDOH — ECONOMIC STABILITY: FOOD INSECURITY: WITHIN THE PAST 12 MONTHS, THE FOOD YOU BOUGHT JUST DIDN'T LAST AND YOU DIDN'T HAVE MONEY TO GET MORE.: NEVER TRUE

## 2025-04-29 ASSESSMENT — ENCOUNTER SYMPTOMS
SHORTNESS OF BREATH: 0
SORE THROAT: 0
NAUSEA: 0
ABDOMINAL PAIN: 0
COUGH: 0
DIARRHEA: 0
CONSTIPATION: 0

## 2025-04-29 ASSESSMENT — PATIENT HEALTH QUESTIONNAIRE - PHQ9
SUM OF ALL RESPONSES TO PHQ QUESTIONS 1-9: 0
9. THOUGHTS THAT YOU WOULD BE BETTER OFF DEAD, OR OF HURTING YOURSELF: NOT AT ALL
7. TROUBLE CONCENTRATING ON THINGS, SUCH AS READING THE NEWSPAPER OR WATCHING TELEVISION: NOT AT ALL
10. IF YOU CHECKED OFF ANY PROBLEMS, HOW DIFFICULT HAVE THESE PROBLEMS MADE IT FOR YOU TO DO YOUR WORK, TAKE CARE OF THINGS AT HOME, OR GET ALONG WITH OTHER PEOPLE: NOT DIFFICULT AT ALL
SUM OF ALL RESPONSES TO PHQ QUESTIONS 1-9: 0
4. FEELING TIRED OR HAVING LITTLE ENERGY: NOT AT ALL
5. POOR APPETITE OR OVEREATING: NOT AT ALL
6. FEELING BAD ABOUT YOURSELF - OR THAT YOU ARE A FAILURE OR HAVE LET YOURSELF OR YOUR FAMILY DOWN: NOT AT ALL
1. LITTLE INTEREST OR PLEASURE IN DOING THINGS: NOT AT ALL
SUM OF ALL RESPONSES TO PHQ QUESTIONS 1-9: 0
3. TROUBLE FALLING OR STAYING ASLEEP: NOT AT ALL
8. MOVING OR SPEAKING SO SLOWLY THAT OTHER PEOPLE COULD HAVE NOTICED. OR THE OPPOSITE, BEING SO FIGETY OR RESTLESS THAT YOU HAVE BEEN MOVING AROUND A LOT MORE THAN USUAL: NOT AT ALL
2. FEELING DOWN, DEPRESSED OR HOPELESS: NOT AT ALL
SUM OF ALL RESPONSES TO PHQ QUESTIONS 1-9: 0

## 2025-04-29 ASSESSMENT — ANXIETY QUESTIONNAIRES
4. TROUBLE RELAXING: NOT AT ALL
7. FEELING AFRAID AS IF SOMETHING AWFUL MIGHT HAPPEN: NOT AT ALL
2. NOT BEING ABLE TO STOP OR CONTROL WORRYING: NOT AT ALL
1. FEELING NERVOUS, ANXIOUS, OR ON EDGE: NOT AT ALL
6. BECOMING EASILY ANNOYED OR IRRITABLE: NOT AT ALL
5. BEING SO RESTLESS THAT IT IS HARD TO SIT STILL: NOT AT ALL
3. WORRYING TOO MUCH ABOUT DIFFERENT THINGS: NOT AT ALL
GAD7 TOTAL SCORE: 0
IF YOU CHECKED OFF ANY PROBLEMS ON THIS QUESTIONNAIRE, HOW DIFFICULT HAVE THESE PROBLEMS MADE IT FOR YOU TO DO YOUR WORK, TAKE CARE OF THINGS AT HOME, OR GET ALONG WITH OTHER PEOPLE: NOT DIFFICULT AT ALL

## 2025-04-29 NOTE — PROGRESS NOTES
program, started in 02/2025, is reported.    Normal bowel movements with no diarrhea or constipation are attributed to a new diet. Nighttime urination occurs 1 to 2 times if she wakes up, with no urinary incontinence during laughter, coughing, or sneezing. Urgency with bowel movements but not with urination is reported. No palpitations are experienced.    A history of keratosis pilaris is noted, with difficulty applying urea cream due to a habit of picking at the skin.    A history of bilateral carpal tunnel surgery is reported.    PAST SURGICAL HISTORY:  - Bilateral carpal tunnel surgery    This is a 29 y.o. female   Chief Complaint   Patient presents with    New Patient     When doing treadmill at gym toes go numb    .    HPI     Past Medical History:   Diagnosis Date    Abnormal Pap smear of cervix     Allergic rhinitis     Anxiety     Asthma     Autoimmune disorder     Hashimotos thyroiditis    Breast cyst     Reports a cyst in right breast as a teenager- not sure of breast imaging     Depression     Dizziness     Drug effect     Latex sensitivity    GERD (gastroesophageal reflux disease)     Hashimoto's thyroiditis     Headache     HPV (human papilloma virus) infection     Hypersomnia     Obesity     PCOS (polycystic ovarian syndrome)     STD (sexually transmitted disease)        Past Surgical History:   Procedure Laterality Date    BREAST ENHANCEMENT SURGERY      CARPAL TUNNEL RELEASE Left 03/22/2022    LEFT CARPAL TUNNEL RELEASE performed by Marcelo Colon MD at [facility]    CARPAL TUNNEL RELEASE Right 04/05/2022    RIGHT CARPAL TUNNEL RELEASE performed by Marcelo Colon MD at [facility]    COLPOSCOPY      COSMETIC SURGERY      TONSILLECTOMY AND ADENOIDECTOMY      WISDOM TOOTH EXTRACTION         Social History     Socioeconomic History    Marital status: Single     Spouse name: Not on file    Number of children: Not on file    Years of education: Not on file    Highest education level: Not on file

## 2025-05-02 ENCOUNTER — PATIENT MESSAGE (OUTPATIENT)
Dept: FAMILY MEDICINE CLINIC | Age: 30
End: 2025-05-02

## 2025-05-09 LAB
CHOLEST SERPL-MCNC: 174 MG/DL (ref 0–199)
GLUCOSE SERPL-MCNC: 92 MG/DL (ref 70–99)
HDLC SERPL-MCNC: 44 MG/DL (ref 40–60)
LDLC SERPL CALC-MCNC: 104 MG/DL
TRIGL SERPL-MCNC: 131 MG/DL (ref 0–150)

## 2025-05-12 ENCOUNTER — TELEMEDICINE ON DEMAND (OUTPATIENT)
Age: 30
End: 2025-05-12
Payer: COMMERCIAL

## 2025-05-12 DIAGNOSIS — R09.81 NASAL CONGESTION: Primary | ICD-10-CM

## 2025-05-12 DIAGNOSIS — J34.89 SINUS PRESSURE: ICD-10-CM

## 2025-05-12 DIAGNOSIS — R51.9 SINUS HEADACHE: ICD-10-CM

## 2025-05-12 PROCEDURE — 99213 OFFICE O/P EST LOW 20 MIN: CPT | Performed by: NURSE PRACTITIONER

## 2025-05-12 RX ORDER — FLUTICASONE PROPIONATE 50 MCG
2 SPRAY, SUSPENSION (ML) NASAL DAILY
Qty: 16 G | Refills: 0 | Status: SHIPPED | OUTPATIENT
Start: 2025-05-12

## 2025-05-12 ASSESSMENT — ENCOUNTER SYMPTOMS
EYE PAIN: 1
SINUS PRESSURE: 1
COUGH: 0
SINUS COMPLAINT: 1

## 2025-05-12 NOTE — PROGRESS NOTES
Jessica Viveros, was evaluated through a synchronous (real-time) audio-video encounter. The patient (or guardian if applicable) is aware that this is a billable service, which includes applicable co-pays. This Virtual Visit was conducted with patient's (and/or legal guardian's) consent. Patient identification was verified, and a caregiver was present when appropriate.   The patient was located at Home: [address] [facility] [address] [facility] [address]  Provider was located at Home (Appt Dept State): [place]  Confirm you are appropriately licensed, registered, or certified to deliver care in the state where the patient is located as indicated above. If you are not or unsure, please re-schedule the visit: Yes, I confirm.     Jessica Viveros (:  1995) is a Established patient, presenting virtually for evaluation of the following:    Sinus pressure right side of face, around eye, NC with stuffiness and HA's  Started today      Below is the assessment and plan developed based on review of pertinent history, physical exam, labs, studies, and medications.    Assessment & Plan  Nasal congestion    Problem    Orders:    fluticasone (FLONASE) 50 MCG/ACT nasal spray; 2 sprays by Each Nostril route daily  She was instructed to try an over-the-counter nasal saline rinse as the bottle directs    Sinus pressure    Problem    Orders:    fluticasone (FLONASE) 50 MCG/ACT nasal spray; 2 sprays by Each Nostril route daily    Review patient instructions within AVS    Sinus headache    Problem    Review patient instructions within AVS    Patient was instructed to follow-up with provider within 2 to 3 days if symptoms have not improved or if they have worsened        Subjective   This is a 29year old patient of Dr. Lizarraga consenting to an on demand video visit.  Patient has complaints of: Sinus pressure on the right side of the face and around the eyes, nasal congestion with stuffiness, right cheek tender, headaches that started

## 2025-08-05 ENCOUNTER — OFFICE VISIT (OUTPATIENT)
Dept: FAMILY MEDICINE CLINIC | Age: 30
End: 2025-08-05
Payer: COMMERCIAL

## 2025-08-05 VITALS
RESPIRATION RATE: 16 BRPM | DIASTOLIC BLOOD PRESSURE: 78 MMHG | TEMPERATURE: 97.3 F | HEART RATE: 91 BPM | OXYGEN SATURATION: 97 % | BODY MASS INDEX: 36.87 KG/M2 | WEIGHT: 195 LBS | SYSTOLIC BLOOD PRESSURE: 118 MMHG

## 2025-08-05 DIAGNOSIS — M25.50 POLYARTHRALGIA: ICD-10-CM

## 2025-08-05 DIAGNOSIS — Z82.61 FAMILY HISTORY OF RHEUMATOID ARTHRITIS: Primary | ICD-10-CM

## 2025-08-05 PROCEDURE — 99213 OFFICE O/P EST LOW 20 MIN: CPT | Performed by: SURGERY

## 2025-08-05 RX ORDER — M-VIT,TX,IRON,MINS/CALC/FOLIC 27MG-0.4MG
1 TABLET ORAL DAILY
COMMUNITY

## 2025-08-05 SDOH — ECONOMIC STABILITY: INCOME INSECURITY: IN THE LAST 12 MONTHS, WAS THERE A TIME WHEN YOU WERE NOT ABLE TO PAY THE MORTGAGE OR RENT ON TIME?: NO

## 2025-08-05 SDOH — ECONOMIC STABILITY: FOOD INSECURITY: WITHIN THE PAST 12 MONTHS, YOU WORRIED THAT YOUR FOOD WOULD RUN OUT BEFORE YOU GOT MONEY TO BUY MORE.: NEVER TRUE

## 2025-08-05 SDOH — ECONOMIC STABILITY: FOOD INSECURITY: WITHIN THE PAST 12 MONTHS, THE FOOD YOU BOUGHT JUST DIDN'T LAST AND YOU DIDN'T HAVE MONEY TO GET MORE.: NEVER TRUE

## 2025-08-05 ASSESSMENT — ANXIETY QUESTIONNAIRES
4. TROUBLE RELAXING: MORE THAN HALF THE DAYS
5. BEING SO RESTLESS THAT IT IS HARD TO SIT STILL: NOT AT ALL
7. FEELING AFRAID AS IF SOMETHING AWFUL MIGHT HAPPEN: SEVERAL DAYS
1. FEELING NERVOUS, ANXIOUS, OR ON EDGE: MORE THAN HALF THE DAYS
4. TROUBLE RELAXING: MORE THAN HALF THE DAYS
5. BEING SO RESTLESS THAT IT IS HARD TO SIT STILL: NOT AT ALL
IF YOU CHECKED OFF ANY PROBLEMS ON THIS QUESTIONNAIRE, HOW DIFFICULT HAVE THESE PROBLEMS MADE IT FOR YOU TO DO YOUR WORK, TAKE CARE OF THINGS AT HOME, OR GET ALONG WITH OTHER PEOPLE: SOMEWHAT DIFFICULT
GAD7 TOTAL SCORE: 10
3. WORRYING TOO MUCH ABOUT DIFFERENT THINGS: MORE THAN HALF THE DAYS
2. NOT BEING ABLE TO STOP OR CONTROL WORRYING: MORE THAN HALF THE DAYS
7. FEELING AFRAID AS IF SOMETHING AWFUL MIGHT HAPPEN: SEVERAL DAYS
6. BECOMING EASILY ANNOYED OR IRRITABLE: SEVERAL DAYS
1. FEELING NERVOUS, ANXIOUS, OR ON EDGE: MORE THAN HALF THE DAYS
2. NOT BEING ABLE TO STOP OR CONTROL WORRYING: MORE THAN HALF THE DAYS
IF YOU CHECKED OFF ANY PROBLEMS ON THIS QUESTIONNAIRE, HOW DIFFICULT HAVE THESE PROBLEMS MADE IT FOR YOU TO DO YOUR WORK, TAKE CARE OF THINGS AT HOME, OR GET ALONG WITH OTHER PEOPLE: SOMEWHAT DIFFICULT
6. BECOMING EASILY ANNOYED OR IRRITABLE: SEVERAL DAYS
3. WORRYING TOO MUCH ABOUT DIFFERENT THINGS: MORE THAN HALF THE DAYS

## 2025-08-05 ASSESSMENT — PATIENT HEALTH QUESTIONNAIRE - PHQ9
SUM OF ALL RESPONSES TO PHQ QUESTIONS 1-9: 2
SUM OF ALL RESPONSES TO PHQ QUESTIONS 1-9: 2
2. FEELING DOWN, DEPRESSED OR HOPELESS: SEVERAL DAYS
SUM OF ALL RESPONSES TO PHQ QUESTIONS 1-9: 2
1. LITTLE INTEREST OR PLEASURE IN DOING THINGS: SEVERAL DAYS
1. LITTLE INTEREST OR PLEASURE IN DOING THINGS: SEVERAL DAYS
2. FEELING DOWN, DEPRESSED OR HOPELESS: SEVERAL DAYS
SUM OF ALL RESPONSES TO PHQ QUESTIONS 1-9: 2
SUM OF ALL RESPONSES TO PHQ9 QUESTIONS 1 & 2: 2

## 2025-08-13 DIAGNOSIS — E03.9 ACQUIRED HYPOTHYROIDISM: ICD-10-CM

## 2025-08-13 DIAGNOSIS — M25.50 POLYARTHRALGIA: ICD-10-CM

## 2025-08-13 DIAGNOSIS — E55.9 VITAMIN D INSUFFICIENCY: ICD-10-CM

## 2025-08-13 DIAGNOSIS — Z82.61 FAMILY HISTORY OF RHEUMATOID ARTHRITIS: ICD-10-CM

## 2025-08-13 LAB
RHEUMATOID FACT SER IA-ACNC: <10 IU/ML
T4 FREE SERPL-MCNC: 1.2 NG/DL (ref 0.9–1.8)
TSH SERPL DL<=0.005 MIU/L-ACNC: 0.92 UIU/ML (ref 0.27–4.2)

## 2025-08-14 LAB — ANA SER QL IA: NEGATIVE

## 2025-08-20 ENCOUNTER — OFFICE VISIT (OUTPATIENT)
Dept: ENDOCRINOLOGY | Age: 30
End: 2025-08-20
Payer: COMMERCIAL

## 2025-08-20 VITALS
DIASTOLIC BLOOD PRESSURE: 89 MMHG | WEIGHT: 195 LBS | HEART RATE: 101 BPM | SYSTOLIC BLOOD PRESSURE: 121 MMHG | TEMPERATURE: 98 F | BODY MASS INDEX: 36.82 KG/M2 | RESPIRATION RATE: 14 BRPM | HEIGHT: 61 IN | OXYGEN SATURATION: 99 %

## 2025-08-20 DIAGNOSIS — R79.89 ELEVATED LIVER FUNCTION TESTS: ICD-10-CM

## 2025-08-20 DIAGNOSIS — E28.2 PCOS (POLYCYSTIC OVARIAN SYNDROME): ICD-10-CM

## 2025-08-20 DIAGNOSIS — E03.9 ACQUIRED HYPOTHYROIDISM: Primary | ICD-10-CM

## 2025-08-20 DIAGNOSIS — E66.812 CLASS 2 SEVERE OBESITY WITH SERIOUS COMORBIDITY AND BODY MASS INDEX (BMI) OF 36.0 TO 36.9 IN ADULT, UNSPECIFIED OBESITY TYPE (HCC): ICD-10-CM

## 2025-08-20 DIAGNOSIS — E55.9 VITAMIN D INSUFFICIENCY: ICD-10-CM

## 2025-08-20 DIAGNOSIS — E06.3 HASHIMOTO'S THYROIDITIS: ICD-10-CM

## 2025-08-20 DIAGNOSIS — E66.01 CLASS 2 SEVERE OBESITY WITH SERIOUS COMORBIDITY AND BODY MASS INDEX (BMI) OF 36.0 TO 36.9 IN ADULT, UNSPECIFIED OBESITY TYPE (HCC): ICD-10-CM

## 2025-08-20 PROCEDURE — 99214 OFFICE O/P EST MOD 30 MIN: CPT | Performed by: INTERNAL MEDICINE

## 2025-08-20 RX ORDER — ERGOCALCIFEROL 1.25 MG/1
CAPSULE, LIQUID FILLED ORAL
Qty: 12 CAPSULE | Refills: 1 | Status: SHIPPED | OUTPATIENT
Start: 2025-08-20

## 2025-08-20 RX ORDER — LEVOTHYROXINE SODIUM 88 UG/1
TABLET ORAL
Qty: 90 TABLET | Refills: 1 | Status: SHIPPED | OUTPATIENT
Start: 2025-08-20

## (undated) DEVICE — SUTURE CHROMIC GUT SZ 4-0 L18IN ABSRB BRN L19MM PS-2 3/8 1637G

## (undated) DEVICE — Device

## (undated) DEVICE — SYRINGE MED 10ML LUERLOCK TIP W/O SFTY DISP

## (undated) DEVICE — SET ADMIN PRIMING 7ML L30IN 7.35LB 20 GTT 2ND RLER CLMP

## (undated) DEVICE — GLOVE SURG SZ 8 L12IN FNGR THK94MIL STD WHT LTX FREE

## (undated) DEVICE — SET GRAV VENT NVENT CK VLV 3 NDL FREE PRT 10 GTT

## (undated) DEVICE — SOLUTION IV IRRIG 500ML 0.9% SODIUM CHL 2F7123

## (undated) DEVICE — CATHETER IV 20GA L1.25IN PNK FEP SFTY STR HUB RADPQ DISP

## (undated) DEVICE — DRAPE HND W114XL142IN BLU POLYPR W O PCH FEN CRD AND TB HLDR

## (undated) DEVICE — GOWN,SIRUS,NON REINFRCD,LARGE,SET IN SL: Brand: MEDLINE

## (undated) DEVICE — DRESSING PETRO W7.6XL7.6CM CELOS ACETT NONADHERING MESH

## (undated) DEVICE — GLOVE SURG SZ 65 L12IN FNGR THK94MIL STD WHT LTX FREE

## (undated) DEVICE — SOLUTION IV 1000ML LAC RINGERS PH 6.5 INJ USP VIAFLX PLAS

## (undated) DEVICE — NEEDLE HYPO 25GA L1.5IN BLU POLYPR HUB S STL REG BVL STR

## (undated) DEVICE — ZIMMER® STERILE DISPOSABLE TOURNIQUET CUFF WITH PLC, DUAL PORT, SINGLE BLADDER, 18 IN. (46 CM)

## (undated) DEVICE — UNDERGLOVE SURG SZ 8 FNGR THK0.21MIL GRN LTX BEAD CUF

## (undated) DEVICE — 3M™ TEGADERM™ TRANSPARENT FILM DRESSING FRAME STYLE, 1624W, 2-3/8 IN X 2-3/4 IN (6 CM X 7 CM), 100/CT 4CT/CASE: Brand: 3M™ TEGADERM™

## (undated) DEVICE — GLOVE ORANGE PI 7 1/2   MSG9075

## (undated) DEVICE — CORD ES L12FT BPLR FRCP

## (undated) DEVICE — GLOVE,SURG,SENSICARE,ALOE,LF,PF,7: Brand: MEDLINE

## (undated) DEVICE — NEEDLE HYPO 18GA L1.5IN THN WALL PIVOTING SHLD BVL ORIENTED

## (undated) DEVICE — BANDAGE COMPR W2INXL5YD TAN BRTH SELF ADH WRP W/ HND TEAR